# Patient Record
Sex: FEMALE | Race: WHITE | Employment: OTHER | URBAN - METROPOLITAN AREA
[De-identification: names, ages, dates, MRNs, and addresses within clinical notes are randomized per-mention and may not be internally consistent; named-entity substitution may affect disease eponyms.]

---

## 2017-01-10 ENCOUNTER — TELEPHONE (OUTPATIENT)
Dept: FAMILY MEDICINE CLINIC | Age: 82
End: 2017-01-10

## 2017-01-26 ENCOUNTER — OFFICE VISIT (OUTPATIENT)
Dept: FAMILY MEDICINE CLINIC | Age: 82
End: 2017-01-26

## 2017-01-26 VITALS — DIASTOLIC BLOOD PRESSURE: 70 MMHG | HEIGHT: 58 IN | SYSTOLIC BLOOD PRESSURE: 130 MMHG

## 2017-01-26 DIAGNOSIS — J40 BRONCHITIS: Primary | ICD-10-CM

## 2017-01-26 DIAGNOSIS — Z23 NEED FOR PNEUMOCOCCAL VACCINATION: ICD-10-CM

## 2017-01-26 DIAGNOSIS — I10 BENIGN ESSENTIAL HYPERTENSION: ICD-10-CM

## 2017-01-26 PROCEDURE — 4040F PNEUMOC VAC/ADMIN/RCVD: CPT | Performed by: FAMILY MEDICINE

## 2017-01-26 PROCEDURE — G0009 ADMIN PNEUMOCOCCAL VACCINE: HCPCS | Performed by: FAMILY MEDICINE

## 2017-01-26 PROCEDURE — G8427 DOCREV CUR MEDS BY ELIG CLIN: HCPCS | Performed by: FAMILY MEDICINE

## 2017-01-26 PROCEDURE — 1036F TOBACCO NON-USER: CPT | Performed by: FAMILY MEDICINE

## 2017-01-26 PROCEDURE — G8420 CALC BMI NORM PARAMETERS: HCPCS | Performed by: FAMILY MEDICINE

## 2017-01-26 PROCEDURE — 99213 OFFICE O/P EST LOW 20 MIN: CPT | Performed by: FAMILY MEDICINE

## 2017-01-26 PROCEDURE — G8484 FLU IMMUNIZE NO ADMIN: HCPCS | Performed by: FAMILY MEDICINE

## 2017-01-26 PROCEDURE — 1123F ACP DISCUSS/DSCN MKR DOCD: CPT | Performed by: FAMILY MEDICINE

## 2017-01-26 PROCEDURE — 1090F PRES/ABSN URINE INCON ASSESS: CPT | Performed by: FAMILY MEDICINE

## 2017-01-26 PROCEDURE — 90670 PCV13 VACCINE IM: CPT | Performed by: FAMILY MEDICINE

## 2017-01-26 RX ORDER — AMOXICILLIN 875 MG/1
875 TABLET, COATED ORAL 2 TIMES DAILY
Qty: 20 TABLET | Refills: 0 | Status: SHIPPED | OUTPATIENT
Start: 2017-01-26 | End: 2017-02-05

## 2017-01-26 ASSESSMENT — PATIENT HEALTH QUESTIONNAIRE - PHQ9
1. LITTLE INTEREST OR PLEASURE IN DOING THINGS: 1
SUM OF ALL RESPONSES TO PHQ9 QUESTIONS 1 & 2: 2
SUM OF ALL RESPONSES TO PHQ QUESTIONS 1-9: 2
2. FEELING DOWN, DEPRESSED OR HOPELESS: 1

## 2017-02-09 RX ORDER — LISINOPRIL AND HYDROCHLOROTHIAZIDE 20; 12.5 MG/1; MG/1
TABLET ORAL
Qty: 90 TABLET | Refills: 1 | Status: SHIPPED | OUTPATIENT
Start: 2017-02-09 | End: 2017-07-11 | Stop reason: SDUPTHER

## 2017-11-16 ENCOUNTER — OFFICE VISIT (OUTPATIENT)
Dept: FAMILY MEDICINE CLINIC | Age: 82
End: 2017-11-16

## 2017-11-16 VITALS — DIASTOLIC BLOOD PRESSURE: 64 MMHG | WEIGHT: 138 LBS | SYSTOLIC BLOOD PRESSURE: 102 MMHG | BODY MASS INDEX: 28.84 KG/M2

## 2017-11-16 DIAGNOSIS — I10 ESSENTIAL HYPERTENSION: ICD-10-CM

## 2017-11-16 DIAGNOSIS — M10.9 GOUTY ARTHROPATHY: ICD-10-CM

## 2017-11-16 DIAGNOSIS — Z23 NEED FOR INFLUENZA VACCINATION: ICD-10-CM

## 2017-11-16 DIAGNOSIS — E78.00 HYPERCHOLESTEROLEMIA: ICD-10-CM

## 2017-11-16 DIAGNOSIS — E11.9 TYPE 2 DIABETES MELLITUS WITHOUT COMPLICATION, WITHOUT LONG-TERM CURRENT USE OF INSULIN (HCC): Primary | ICD-10-CM

## 2017-11-16 LAB
ALT SERPL-CCNC: <5 U/L (ref 10–40)
ANION GAP SERPL CALCULATED.3IONS-SCNC: 26 MMOL/L (ref 3–16)
AST SERPL-CCNC: 12 U/L (ref 15–37)
BUN BLDV-MCNC: 12 MG/DL (ref 7–20)
CALCIUM SERPL-MCNC: 10.4 MG/DL (ref 8.3–10.6)
CHLORIDE BLD-SCNC: 91 MMOL/L (ref 99–110)
CHOLESTEROL, TOTAL: 235 MG/DL (ref 0–199)
CO2: 17 MMOL/L (ref 21–32)
CREAT SERPL-MCNC: 1 MG/DL (ref 0.6–1.2)
GFR AFRICAN AMERICAN: >60
GFR NON-AFRICAN AMERICAN: 52
GLUCOSE BLD-MCNC: 129 MG/DL (ref 70–99)
HDLC SERPL-MCNC: 56 MG/DL (ref 40–60)
LDL CHOLESTEROL CALCULATED: ABNORMAL MG/DL
LDL CHOLESTEROL DIRECT: 149 MG/DL
POTASSIUM SERPL-SCNC: 4.4 MMOL/L (ref 3.5–5.1)
SODIUM BLD-SCNC: 134 MMOL/L (ref 136–145)
TRIGL SERPL-MCNC: 332 MG/DL (ref 0–150)
URIC ACID, SERUM: 8.3 MG/DL (ref 2.6–6)
VLDLC SERPL CALC-MCNC: ABNORMAL MG/DL

## 2017-11-16 PROCEDURE — G8427 DOCREV CUR MEDS BY ELIG CLIN: HCPCS | Performed by: FAMILY MEDICINE

## 2017-11-16 PROCEDURE — 4040F PNEUMOC VAC/ADMIN/RCVD: CPT | Performed by: FAMILY MEDICINE

## 2017-11-16 PROCEDURE — 1036F TOBACCO NON-USER: CPT | Performed by: FAMILY MEDICINE

## 2017-11-16 PROCEDURE — G8419 CALC BMI OUT NRM PARAM NOF/U: HCPCS | Performed by: FAMILY MEDICINE

## 2017-11-16 PROCEDURE — 99214 OFFICE O/P EST MOD 30 MIN: CPT | Performed by: FAMILY MEDICINE

## 2017-11-16 PROCEDURE — 1123F ACP DISCUSS/DSCN MKR DOCD: CPT | Performed by: FAMILY MEDICINE

## 2017-11-16 PROCEDURE — 36415 COLL VENOUS BLD VENIPUNCTURE: CPT | Performed by: FAMILY MEDICINE

## 2017-11-16 PROCEDURE — 1090F PRES/ABSN URINE INCON ASSESS: CPT | Performed by: FAMILY MEDICINE

## 2017-11-16 PROCEDURE — G8484 FLU IMMUNIZE NO ADMIN: HCPCS | Performed by: FAMILY MEDICINE

## 2017-11-16 RX ORDER — LISINOPRIL AND HYDROCHLOROTHIAZIDE 20; 12.5 MG/1; MG/1
TABLET ORAL
Qty: 90 TABLET | Refills: 1 | Status: SHIPPED | OUTPATIENT
Start: 2017-11-16 | End: 2018-03-28 | Stop reason: DRUGHIGH

## 2017-11-16 ASSESSMENT — ENCOUNTER SYMPTOMS: SHORTNESS OF BREATH: 1

## 2017-11-16 NOTE — PROGRESS NOTES
Subjective:      Patient ID: Josue Woods is a 80 y.o. female. Diabetes   She presents for her follow-up diabetic visit. She has type 2 diabetes mellitus. Her disease course has been stable. Pertinent negatives for diabetes include no chest pain, no foot paresthesias, no foot ulcerations, no polydipsia and no polyuria. There are no hypoglycemic complications. Symptoms are stable. There are no diabetic complications. Risk factors for coronary artery disease include hypertension, post-menopausal, sedentary lifestyle, diabetes mellitus, dyslipidemia and obesity. Current diabetic treatment includes oral agent (monotherapy). She is compliant with treatment all of the time. Her weight is stable. She is following a generally healthy diet. She never participates in exercise. There is no change in her home blood glucose trend. Her overall blood glucose range is 110-130 mg/dl. An ACE inhibitor/angiotensin II receptor blocker is being taken. Eye exam is current (May 2017). Hypertension   This is a chronic problem. The current episode started more than 1 year ago. The problem is unchanged. The problem is controlled. Associated symptoms include shortness of breath. Pertinent negatives include no chest pain, palpitations or peripheral edema. Past treatments include ACE inhibitors and diuretics. The current treatment provides significant improvement. There are no compliance problems. Hyperlipidemia:  Pt has not taken Lipitor for the past 2 years per her daughter. Gout:  Pt no longer takes Allopurinol 100 mg daily or colchicine 06 mg daily. She denies any gout attacks since her last visit. She is due for a Uric Acid recheck today. Review of Systems   Constitutional: Negative for chills and fever. Respiratory: Positive for shortness of breath. Cardiovascular: Negative for chest pain and palpitations. Endocrine: Negative for polydipsia and polyuria.       BP Readings from Last 3 Encounters:   11/16/17

## 2017-11-17 LAB
ESTIMATED AVERAGE GLUCOSE: 114 MG/DL
HBA1C MFR BLD: 5.6 %

## 2018-01-31 ENCOUNTER — TELEPHONE (OUTPATIENT)
Dept: FAMILY MEDICINE CLINIC | Age: 83
End: 2018-01-31

## 2018-02-01 ENCOUNTER — OFFICE VISIT (OUTPATIENT)
Dept: FAMILY MEDICINE CLINIC | Age: 83
End: 2018-02-01

## 2018-02-01 VITALS — SYSTOLIC BLOOD PRESSURE: 128 MMHG | DIASTOLIC BLOOD PRESSURE: 80 MMHG | HEIGHT: 58 IN

## 2018-02-01 DIAGNOSIS — M15.9 PRIMARY OSTEOARTHRITIS INVOLVING MULTIPLE JOINTS: ICD-10-CM

## 2018-02-01 DIAGNOSIS — I10 ESSENTIAL HYPERTENSION: ICD-10-CM

## 2018-02-01 DIAGNOSIS — G30.1 LATE ONSET ALZHEIMER'S DISEASE WITHOUT BEHAVIORAL DISTURBANCE (HCC): ICD-10-CM

## 2018-02-01 DIAGNOSIS — R29.898 WEAKNESS OF RIGHT HIP: Primary | ICD-10-CM

## 2018-02-01 DIAGNOSIS — F02.80 LATE ONSET ALZHEIMER'S DISEASE WITHOUT BEHAVIORAL DISTURBANCE (HCC): ICD-10-CM

## 2018-02-01 DIAGNOSIS — E11.9 TYPE 2 DIABETES MELLITUS WITHOUT COMPLICATION, WITHOUT LONG-TERM CURRENT USE OF INSULIN (HCC): ICD-10-CM

## 2018-02-01 LAB
A/G RATIO: 1 (ref 1.1–2.2)
ALBUMIN SERPL-MCNC: 3.7 G/DL (ref 3.4–5)
ALP BLD-CCNC: 111 U/L (ref 40–129)
ALT SERPL-CCNC: 7 U/L (ref 10–40)
ANION GAP SERPL CALCULATED.3IONS-SCNC: 23 MMOL/L (ref 3–16)
AST SERPL-CCNC: 16 U/L (ref 15–37)
BILIRUB SERPL-MCNC: 0.4 MG/DL (ref 0–1)
BUN BLDV-MCNC: 15 MG/DL (ref 7–20)
CALCIUM SERPL-MCNC: 10 MG/DL (ref 8.3–10.6)
CHLORIDE BLD-SCNC: 96 MMOL/L (ref 99–110)
CO2: 20 MMOL/L (ref 21–32)
CREAT SERPL-MCNC: 1 MG/DL (ref 0.6–1.2)
GFR AFRICAN AMERICAN: >60
GFR NON-AFRICAN AMERICAN: 52
GLOBULIN: 3.7 G/DL
GLUCOSE BLD-MCNC: 100 MG/DL (ref 70–99)
HCT VFR BLD CALC: 40.3 % (ref 36–48)
HEMOGLOBIN: 13.1 G/DL (ref 12–16)
MCH RBC QN AUTO: 30.3 PG (ref 26–34)
MCHC RBC AUTO-ENTMCNC: 32.5 G/DL (ref 31–36)
MCV RBC AUTO: 93.3 FL (ref 80–100)
PDW BLD-RTO: 14.8 % (ref 12.4–15.4)
PLATELET # BLD: 435 K/UL (ref 135–450)
PMV BLD AUTO: 7.9 FL (ref 5–10.5)
POTASSIUM SERPL-SCNC: 4.3 MMOL/L (ref 3.5–5.1)
RBC # BLD: 4.32 M/UL (ref 4–5.2)
SEDIMENTATION RATE, ERYTHROCYTE: 59 MM/HR (ref 0–30)
SODIUM BLD-SCNC: 139 MMOL/L (ref 136–145)
TOTAL PROTEIN: 7.4 G/DL (ref 6.4–8.2)
TSH REFLEX: 1.24 UIU/ML (ref 0.27–4.2)
WBC # BLD: 10.9 K/UL (ref 4–11)

## 2018-02-01 PROCEDURE — 1036F TOBACCO NON-USER: CPT | Performed by: FAMILY MEDICINE

## 2018-02-01 PROCEDURE — G8419 CALC BMI OUT NRM PARAM NOF/U: HCPCS | Performed by: FAMILY MEDICINE

## 2018-02-01 PROCEDURE — G8484 FLU IMMUNIZE NO ADMIN: HCPCS | Performed by: FAMILY MEDICINE

## 2018-02-01 PROCEDURE — 4040F PNEUMOC VAC/ADMIN/RCVD: CPT | Performed by: FAMILY MEDICINE

## 2018-02-01 PROCEDURE — 36415 COLL VENOUS BLD VENIPUNCTURE: CPT | Performed by: FAMILY MEDICINE

## 2018-02-01 PROCEDURE — 99214 OFFICE O/P EST MOD 30 MIN: CPT | Performed by: FAMILY MEDICINE

## 2018-02-01 PROCEDURE — 1123F ACP DISCUSS/DSCN MKR DOCD: CPT | Performed by: FAMILY MEDICINE

## 2018-02-01 PROCEDURE — 1090F PRES/ABSN URINE INCON ASSESS: CPT | Performed by: FAMILY MEDICINE

## 2018-02-01 PROCEDURE — G8427 DOCREV CUR MEDS BY ELIG CLIN: HCPCS | Performed by: FAMILY MEDICINE

## 2018-02-01 NOTE — PROGRESS NOTES
insulin ([facility] [address] good control                  Plan:      Lab  Ref for home therapy  Cont meds   Spent 30 minutes with patient and/or family in which greater than half the time was focused on counseling and educating patient and/or  family regarding current health isues  rto4 week

## 2018-02-02 ASSESSMENT — PATIENT HEALTH QUESTIONNAIRE - PHQ9
SUM OF ALL RESPONSES TO PHQ QUESTIONS 1-9: 2
SUM OF ALL RESPONSES TO PHQ9 QUESTIONS 1 & 2: 2
2. FEELING DOWN, DEPRESSED OR HOPELESS: 1
1. LITTLE INTEREST OR PLEASURE IN DOING THINGS: 1

## 2018-02-06 ENCOUNTER — TELEPHONE (OUTPATIENT)
Dept: FAMILY MEDICINE CLINIC | Age: 83
End: 2018-02-06

## 2018-02-07 ENCOUNTER — NURSE ONLY (OUTPATIENT)
Dept: FAMILY MEDICINE CLINIC | Age: 83
End: 2018-02-07

## 2018-02-07 ENCOUNTER — TELEPHONE (OUTPATIENT)
Dept: FAMILY MEDICINE CLINIC | Age: 83
End: 2018-02-07

## 2018-02-07 DIAGNOSIS — R35.0 FREQUENT URINATION: Primary | ICD-10-CM

## 2018-02-07 LAB
BILIRUBIN, POC: ABNORMAL
BLOOD URINE, POC: ABNORMAL
CLARITY, POC: ABNORMAL
COLOR, POC: YELLOW
GLUCOSE URINE, POC: ABNORMAL
KETONES, POC: ABNORMAL
LEUKOCYTE EST, POC: ABNORMAL
NITRITE, POC: POSITIVE
PH, POC: 6.5
PROTEIN, POC: ABNORMAL
SPECIFIC GRAVITY, POC: 1.01
UROBILINOGEN, POC: 0.2

## 2018-02-07 PROCEDURE — 81002 URINALYSIS NONAUTO W/O SCOPE: CPT | Performed by: FAMILY MEDICINE

## 2018-02-07 RX ORDER — CIPROFLOXACIN 250 MG/1
250 TABLET, FILM COATED ORAL 2 TIMES DAILY
Qty: 14 TABLET | Refills: 0 | Status: SHIPPED | OUTPATIENT
Start: 2018-02-07 | End: 2018-02-15 | Stop reason: SDUPTHER

## 2018-02-07 NOTE — TELEPHONE ENCOUNTER
Pt's daughter brougth urine sample in   UA done results in lab tab  Urine sent for culture  CHEN Jauregui  Please call Mingo Fabry with message [phone]

## 2018-02-07 NOTE — PROGRESS NOTES
Pt's daughter dropped urine sample off  Pt is experiencing oder, frequency and confusion  UA done results in lab tab  Will send urine for culture

## 2018-02-09 LAB
ORGANISM: ABNORMAL
URINE CULTURE, ROUTINE: ABNORMAL
URINE CULTURE, ROUTINE: ABNORMAL

## 2018-02-14 ENCOUNTER — NURSE ONLY (OUTPATIENT)
Dept: FAMILY MEDICINE CLINIC | Age: 83
End: 2018-02-14

## 2018-02-14 DIAGNOSIS — R31.9 HEMATURIA, UNSPECIFIED TYPE: Primary | ICD-10-CM

## 2018-02-14 LAB
BILIRUBIN, POC: NORMAL
BLOOD URINE, POC: NORMAL
CLARITY, POC: NORMAL
COLOR, POC: YELLOW
GLUCOSE URINE, POC: NORMAL
KETONES, POC: NORMAL
LEUKOCYTE EST, POC: NORMAL
NITRITE, POC: NORMAL
PH, POC: 5.5
PROTEIN, POC: NORMAL
SPECIFIC GRAVITY, POC: 1.02
UROBILINOGEN, POC: 0.2

## 2018-02-14 PROCEDURE — 81002 URINALYSIS NONAUTO W/O SCOPE: CPT | Performed by: FAMILY MEDICINE

## 2018-02-15 ENCOUNTER — TELEPHONE (OUTPATIENT)
Dept: FAMILY MEDICINE CLINIC | Age: 83
End: 2018-02-15

## 2018-02-15 RX ORDER — CIPROFLOXACIN 250 MG/1
250 TABLET, FILM COATED ORAL 2 TIMES DAILY
Qty: 14 TABLET | Refills: 0 | Status: SHIPPED | OUTPATIENT
Start: 2018-02-15 | End: 2018-02-22

## 2018-02-15 NOTE — TELEPHONE ENCOUNTER
Patient daughter Emmie Cushing called wanting to know about her mom UA results.   Contact Susi at [phone]

## 2018-03-28 ENCOUNTER — OFFICE VISIT (OUTPATIENT)
Dept: FAMILY MEDICINE CLINIC | Age: 83
End: 2018-03-28

## 2018-03-28 VITALS
SYSTOLIC BLOOD PRESSURE: 122 MMHG | OXYGEN SATURATION: 98 % | HEART RATE: 88 BPM | WEIGHT: 136 LBS | DIASTOLIC BLOOD PRESSURE: 84 MMHG | TEMPERATURE: 98.6 F | HEIGHT: 58 IN | BODY MASS INDEX: 28.55 KG/M2 | RESPIRATION RATE: 20 BRPM

## 2018-03-28 DIAGNOSIS — R41.3 MEMORY PROBLEM: ICD-10-CM

## 2018-03-28 DIAGNOSIS — N30.00 ACUTE CYSTITIS WITHOUT HEMATURIA: ICD-10-CM

## 2018-03-28 DIAGNOSIS — Z74.09 IMPAIRED MOBILITY: ICD-10-CM

## 2018-03-28 DIAGNOSIS — E11.9 TYPE 2 DIABETES MELLITUS WITHOUT COMPLICATION, WITHOUT LONG-TERM CURRENT USE OF INSULIN (HCC): ICD-10-CM

## 2018-03-28 DIAGNOSIS — N18.30 CKD (CHRONIC KIDNEY DISEASE) STAGE 3, GFR 30-59 ML/MIN (HCC): ICD-10-CM

## 2018-03-28 DIAGNOSIS — Z85.3 HISTORY OF BREAST CANCER: ICD-10-CM

## 2018-03-28 DIAGNOSIS — I10 ESSENTIAL HYPERTENSION: Primary | ICD-10-CM

## 2018-03-28 LAB
BILIRUBIN, POC: ABNORMAL
BLOOD URINE, POC: ABNORMAL
CLARITY, POC: CLEAR
COLOR, POC: YELLOW
GLUCOSE URINE, POC: ABNORMAL
KETONES, POC: ABNORMAL
LEUKOCYTE EST, POC: ABNORMAL
NITRITE, POC: ABNORMAL
PH, POC: 8.5
PROTEIN, POC: ABNORMAL
SPECIFIC GRAVITY, POC: 1.01
UROBILINOGEN, POC: 0.2

## 2018-03-28 PROCEDURE — G8419 CALC BMI OUT NRM PARAM NOF/U: HCPCS | Performed by: FAMILY MEDICINE

## 2018-03-28 PROCEDURE — 1036F TOBACCO NON-USER: CPT | Performed by: FAMILY MEDICINE

## 2018-03-28 PROCEDURE — 4040F PNEUMOC VAC/ADMIN/RCVD: CPT | Performed by: FAMILY MEDICINE

## 2018-03-28 PROCEDURE — G8427 DOCREV CUR MEDS BY ELIG CLIN: HCPCS | Performed by: FAMILY MEDICINE

## 2018-03-28 PROCEDURE — 81002 URINALYSIS NONAUTO W/O SCOPE: CPT | Performed by: FAMILY MEDICINE

## 2018-03-28 PROCEDURE — G8484 FLU IMMUNIZE NO ADMIN: HCPCS | Performed by: FAMILY MEDICINE

## 2018-03-28 PROCEDURE — 99215 OFFICE O/P EST HI 40 MIN: CPT | Performed by: FAMILY MEDICINE

## 2018-03-28 PROCEDURE — 1123F ACP DISCUSS/DSCN MKR DOCD: CPT | Performed by: FAMILY MEDICINE

## 2018-03-28 PROCEDURE — 1090F PRES/ABSN URINE INCON ASSESS: CPT | Performed by: FAMILY MEDICINE

## 2018-03-28 RX ORDER — LISINOPRIL AND HYDROCHLOROTHIAZIDE 12.5; 1 MG/1; MG/1
1 TABLET ORAL DAILY
Qty: 30 TABLET | Refills: 3 | Status: SHIPPED | OUTPATIENT
Start: 2018-03-28 | End: 2018-07-23 | Stop reason: SDUPTHER

## 2018-03-28 RX ORDER — CEPHALEXIN 500 MG/1
500 CAPSULE ORAL 2 TIMES DAILY
Qty: 14 CAPSULE | Refills: 0 | Status: SHIPPED | OUTPATIENT
Start: 2018-03-28 | End: 2018-03-28 | Stop reason: SDUPTHER

## 2018-03-28 RX ORDER — CEPHALEXIN 500 MG/1
500 CAPSULE ORAL 2 TIMES DAILY
Qty: 14 CAPSULE | Refills: 0 | Status: SHIPPED | OUTPATIENT
Start: 2018-03-28 | End: 2018-04-25 | Stop reason: ALTCHOICE

## 2018-03-28 RX ORDER — LISINOPRIL AND HYDROCHLOROTHIAZIDE 12.5; 1 MG/1; MG/1
1 TABLET ORAL DAILY
Qty: 30 TABLET | Refills: 3 | Status: SHIPPED | OUTPATIENT
Start: 2018-03-28 | End: 2018-03-28 | Stop reason: SDUPTHER

## 2018-03-28 NOTE — PROGRESS NOTES
3/28/2018    Riley Piña is a 80 y.o. female here to establish care with me. HPI   [place]  3 daughters, lives with  (who is seen here) and her daughter Kade Nephew  7 grandkids, most in town    Hx of breast cancer - In 2000. She had chemo, surgery, and radiation. Chronic low back pain - has a stimulator implant, placed in May 2011. Used to see Dr. Conor Dowd but he moved. Has been a few years since seeing someone for this. Memory - pt is often confused, has to be reminded by daughter to go to bathroom. Wears diapers to prevent accidents. Gets her grandchildren and great grandchildren confused sometimes. No hallucinations. Mobility - Spends her time sitting / lying down, doesn't ambulate well - uses a walker for this. She has had a few falls in the past year. She stays on the first level, does not use stairs. There are stairs off the porch that she struggles with    Daughter has noticed strong urine smell recently, peeing more often than usual. Last UTI 2/7/18 with pan-sensitive Citrobacter. HTN - on prinzide, doing well on this. DM - on metformin. runs in the 130s, 150s. Checks blood sugar about once per week. Lab Results   Component Value Date    LABA1C 5.6 11/16/2017     Lab Results   Component Value Date    .0 11/16/2017       Review of Systems   Constitutional: Negative for chills and fever. HENT: Negative for congestion, sinus pressure and sore throat. Eyes: Negative for redness and visual disturbance. Respiratory: Negative for cough and shortness of breath. Cardiovascular: Negative for chest pain and leg swelling. Gastrointestinal: Negative for diarrhea, nausea and vomiting. Genitourinary: Positive for enuresis and frequency. Negative for difficulty urinating. Skin: Negative for color change and rash. Neurological: Positive for weakness. Negative for dizziness and headaches. Psychiatric/Behavioral: Positive for confusion.  Negative for agitation and hallucinations. Prior to Visit Medications    Medication Sig Taking? Authorizing Provider   lisinopril-hydrochlorothiazide (PRINZIDE;ZESTORETIC) 10-12.5 MG per tablet Take 1 tablet by mouth daily Yes Hill Mondragon MD   cephALEXin (KEFLEX) 500 MG capsule Take 1 capsule by mouth 2 times daily Yes Hill Mondragon MD   metFORMIN (GLUCOPHAGE) 500 MG tablet TAKE 1 TABLET TWICE DAILY  WITH MEALS Yes Janet Mera DO   aspirin 81 MG EC tablet Take 1 tablet by mouth daily Yes Elise Palacios MD     Past Medical History:   Diagnosis Date    Cataract     Chronic midline low back pain without sciatica     Essential hypertension     Gouty arthropathy     History of breast cancer     Hypercholesterolemia     Lumbar spondylosis     Osteoporosis of multiple sites     Primary osteoarthritis involving multiple joints     Spinal stenosis, lumbar     Type 2 diabetes mellitus without complication, without long-term current use of insulin [facility])      Past Surgical History:   Procedure Laterality Date    CATARACT REMOVAL  jan 2012    dr Mynor Oliver left    CATARACT REMOVAL  Nov. 2012    dr Mynor Oliver  right    CHOLECYSTECTOMY      MASTECTOMY       No family history on file.   Social History     Social History    Marital status:      Spouse name: N/A    Number of children: 3    Years of education: N/A     Social History Main Topics    Smoking status: Never Smoker    Smokeless tobacco: Never Used    Alcohol use No    Drug use: No    Sexual activity: Not on file     Other Topics Concern    Not on file     Social History Narrative    Grew up in [place]    3 daughters, lives with  (who is seen here) and her daughter Erasto Skaggs    7 grandkids, most in town     No Known Allergies  Health Maintenance   Topic Date Due    Shingles Vaccine (1 of 2 - 2 Dose Series) 09/02/1979    Flu vaccine (1) 09/01/2017    Pneumococcal low/med risk (2 of 2 - PPSV23) 01/26/2018    Potassium monitoring  02/01/2019   

## 2018-03-29 ASSESSMENT — ENCOUNTER SYMPTOMS
SHORTNESS OF BREATH: 0
COLOR CHANGE: 0
DIARRHEA: 0
COUGH: 0
EYE REDNESS: 0
NAUSEA: 0
SINUS PRESSURE: 0
SORE THROAT: 0
VOMITING: 0

## 2018-03-30 LAB
ORGANISM: ABNORMAL
URINE CULTURE, ROUTINE: ABNORMAL
URINE CULTURE, ROUTINE: ABNORMAL

## 2018-04-03 DIAGNOSIS — E11.9 TYPE 2 DIABETES MELLITUS WITHOUT COMPLICATION, WITHOUT LONG-TERM CURRENT USE OF INSULIN (HCC): ICD-10-CM

## 2018-04-03 DIAGNOSIS — R41.3 MEMORY PROBLEM: ICD-10-CM

## 2018-04-03 LAB
A/G RATIO: 1.4 (ref 1.1–2.2)
ALBUMIN SERPL-MCNC: 4.1 G/DL (ref 3.4–5)
ALP BLD-CCNC: 74 U/L (ref 40–129)
ALT SERPL-CCNC: <5 U/L (ref 10–40)
ANION GAP SERPL CALCULATED.3IONS-SCNC: 20 MMOL/L (ref 3–16)
AST SERPL-CCNC: 8 U/L (ref 15–37)
BASOPHILS ABSOLUTE: 0.1 K/UL (ref 0–0.2)
BASOPHILS RELATIVE PERCENT: 0.6 %
BILIRUB SERPL-MCNC: 0.8 MG/DL (ref 0–1)
BUN BLDV-MCNC: 17 MG/DL (ref 7–20)
CALCIUM SERPL-MCNC: 9.8 MG/DL (ref 8.3–10.6)
CHLORIDE BLD-SCNC: 95 MMOL/L (ref 99–110)
CO2: 24 MMOL/L (ref 21–32)
CREAT SERPL-MCNC: 1 MG/DL (ref 0.6–1.2)
EOSINOPHILS ABSOLUTE: 0.3 K/UL (ref 0–0.6)
EOSINOPHILS RELATIVE PERCENT: 2.6 %
ESTIMATED AVERAGE GLUCOSE: 108.3 MG/DL
FOLATE: 4.73 NG/ML (ref 4.78–24.2)
GFR AFRICAN AMERICAN: >60
GFR NON-AFRICAN AMERICAN: 52
GLOBULIN: 3 G/DL
GLUCOSE BLD-MCNC: 99 MG/DL (ref 70–99)
HBA1C MFR BLD: 5.4 %
HCT VFR BLD CALC: 40.3 % (ref 36–48)
HEMOGLOBIN: 13.7 G/DL (ref 12–16)
LYMPHOCYTES ABSOLUTE: 3.5 K/UL (ref 1–5.1)
LYMPHOCYTES RELATIVE PERCENT: 33.3 %
MCH RBC QN AUTO: 30.9 PG (ref 26–34)
MCHC RBC AUTO-ENTMCNC: 33.9 G/DL (ref 31–36)
MCV RBC AUTO: 91.1 FL (ref 80–100)
MONOCYTES ABSOLUTE: 1 K/UL (ref 0–1.3)
MONOCYTES RELATIVE PERCENT: 9.2 %
NEUTROPHILS ABSOLUTE: 5.6 K/UL (ref 1.7–7.7)
NEUTROPHILS RELATIVE PERCENT: 54.3 %
PDW BLD-RTO: 15.8 % (ref 12.4–15.4)
PLATELET # BLD: 292 K/UL (ref 135–450)
PMV BLD AUTO: 8.8 FL (ref 5–10.5)
POTASSIUM SERPL-SCNC: 4.4 MMOL/L (ref 3.5–5.1)
RBC # BLD: 4.42 M/UL (ref 4–5.2)
SODIUM BLD-SCNC: 139 MMOL/L (ref 136–145)
TOTAL PROTEIN: 7.1 G/DL (ref 6.4–8.2)
TSH SERPL DL<=0.05 MIU/L-ACNC: 3.05 UIU/ML (ref 0.27–4.2)
VITAMIN B-12: 209 PG/ML (ref 211–911)
WBC # BLD: 10.4 K/UL (ref 4–11)

## 2018-04-04 ENCOUNTER — TELEPHONE (OUTPATIENT)
Dept: FAMILY MEDICINE CLINIC | Age: 83
End: 2018-04-04

## 2018-04-04 DIAGNOSIS — E53.8 FOLATE DEFICIENCY: ICD-10-CM

## 2018-04-04 DIAGNOSIS — E53.8 VITAMIN B12 DEFICIENCY: Primary | ICD-10-CM

## 2018-04-04 RX ORDER — FOLIC ACID 5 MG
5 CAPSULE ORAL DAILY
Qty: 30 CAPSULE | Refills: 5 | Status: SHIPPED | OUTPATIENT
Start: 2018-04-04 | End: 2019-02-11

## 2018-04-06 ENCOUNTER — HOSPITAL ENCOUNTER (OUTPATIENT)
Dept: CT IMAGING | Age: 83
Discharge: HOME | End: 2018-04-06
Attending: FAMILY MEDICINE | Admitting: FAMILY MEDICINE

## 2018-04-06 DIAGNOSIS — R41.3 OTHER AMNESIA: ICD-10-CM

## 2018-04-06 DIAGNOSIS — R41.3 MEMORY PROBLEM: ICD-10-CM

## 2018-04-25 ENCOUNTER — OFFICE VISIT (OUTPATIENT)
Dept: FAMILY MEDICINE CLINIC | Age: 83
End: 2018-04-25

## 2018-04-25 VITALS
WEIGHT: 129.4 LBS | OXYGEN SATURATION: 97 % | SYSTOLIC BLOOD PRESSURE: 110 MMHG | HEIGHT: 58 IN | BODY MASS INDEX: 27.16 KG/M2 | HEART RATE: 72 BPM | DIASTOLIC BLOOD PRESSURE: 62 MMHG | RESPIRATION RATE: 16 BRPM | TEMPERATURE: 98.8 F

## 2018-04-25 DIAGNOSIS — G30.9 DEMENTIA DUE TO ALZHEIMER'S DISEASE (HCC): Primary | ICD-10-CM

## 2018-04-25 DIAGNOSIS — E53.8 FOLATE DEFICIENCY: ICD-10-CM

## 2018-04-25 DIAGNOSIS — E11.9 TYPE 2 DIABETES MELLITUS WITHOUT COMPLICATION, WITHOUT LONG-TERM CURRENT USE OF INSULIN (HCC): ICD-10-CM

## 2018-04-25 DIAGNOSIS — F02.80 DEMENTIA DUE TO ALZHEIMER'S DISEASE (HCC): Primary | ICD-10-CM

## 2018-04-25 DIAGNOSIS — E53.8 VITAMIN B12 DEFICIENCY: ICD-10-CM

## 2018-04-25 DIAGNOSIS — I10 ESSENTIAL HYPERTENSION: ICD-10-CM

## 2018-04-25 DIAGNOSIS — R26.89 DECREASED MOBILITY: ICD-10-CM

## 2018-04-25 PROCEDURE — G8419 CALC BMI OUT NRM PARAM NOF/U: HCPCS | Performed by: FAMILY MEDICINE

## 2018-04-25 PROCEDURE — 4040F PNEUMOC VAC/ADMIN/RCVD: CPT | Performed by: FAMILY MEDICINE

## 2018-04-25 PROCEDURE — 1123F ACP DISCUSS/DSCN MKR DOCD: CPT | Performed by: FAMILY MEDICINE

## 2018-04-25 PROCEDURE — G8427 DOCREV CUR MEDS BY ELIG CLIN: HCPCS | Performed by: FAMILY MEDICINE

## 2018-04-25 PROCEDURE — 1036F TOBACCO NON-USER: CPT | Performed by: FAMILY MEDICINE

## 2018-04-25 PROCEDURE — 99214 OFFICE O/P EST MOD 30 MIN: CPT | Performed by: FAMILY MEDICINE

## 2018-04-25 PROCEDURE — 1090F PRES/ABSN URINE INCON ASSESS: CPT | Performed by: FAMILY MEDICINE

## 2018-04-25 RX ORDER — WHEELCHAIR
1 EACH MISCELLANEOUS DAILY
Qty: 1 EACH | Refills: 0 | Status: SHIPPED | OUTPATIENT
Start: 2018-04-25 | End: 2018-04-25 | Stop reason: SDUPTHER

## 2018-04-25 RX ORDER — ESCITALOPRAM OXALATE 5 MG/1
TABLET ORAL
Qty: 30 TABLET | Refills: 3 | Status: SHIPPED | OUTPATIENT
Start: 2018-04-25 | End: 2018-06-06 | Stop reason: DRUGHIGH

## 2018-04-25 RX ORDER — WHEELCHAIR
1 EACH MISCELLANEOUS DAILY
Qty: 1 EACH | Refills: 0 | Status: SHIPPED | OUTPATIENT
Start: 2018-04-25 | End: 2018-12-05

## 2018-04-30 ENCOUNTER — TELEPHONE (OUTPATIENT)
Dept: FAMILY MEDICINE CLINIC | Age: 83
End: 2018-04-30

## 2018-04-30 DIAGNOSIS — M48.061 SPINAL STENOSIS OF LUMBAR REGION, UNSPECIFIED WHETHER NEUROGENIC CLAUDICATION PRESENT: Primary | ICD-10-CM

## 2018-05-02 RX ORDER — WHEELCHAIR
EACH MISCELLANEOUS
Qty: 1 EACH | Refills: 0 | Status: SHIPPED | OUTPATIENT
Start: 2018-05-02 | End: 2018-12-05

## 2018-05-24 ENCOUNTER — HOSPITAL ENCOUNTER (OUTPATIENT)
Dept: OTHER | Age: 83
Discharge: HOME | End: 2018-05-24
Attending: FAMILY MEDICINE | Admitting: FAMILY MEDICINE

## 2018-05-24 ENCOUNTER — OFFICE VISIT (OUTPATIENT)
Dept: FAMILY MEDICINE CLINIC | Age: 83
End: 2018-05-24

## 2018-05-24 VITALS
BODY MASS INDEX: 26.24 KG/M2 | RESPIRATION RATE: 14 BRPM | OXYGEN SATURATION: 98 % | SYSTOLIC BLOOD PRESSURE: 132 MMHG | WEIGHT: 125 LBS | HEIGHT: 58 IN | DIASTOLIC BLOOD PRESSURE: 80 MMHG | HEART RATE: 98 BPM

## 2018-05-24 DIAGNOSIS — L03.116 CELLULITIS OF LEFT FOOT: ICD-10-CM

## 2018-05-24 DIAGNOSIS — R30.0 DYSURIA: Primary | ICD-10-CM

## 2018-05-24 LAB
BILIRUBIN, POC: ABNORMAL
BLOOD URINE, POC: ABNORMAL
CLARITY, POC: ABNORMAL
COLOR, POC: YELLOW
GLUCOSE URINE, POC: ABNORMAL
KETONES, POC: ABNORMAL
LEUKOCYTE EST, POC: ABNORMAL
NITRITE, POC: ABNORMAL
PH, POC: 6
PROTEIN, POC: ABNORMAL
SPECIFIC GRAVITY, POC: 1.02
UROBILINOGEN, POC: 0.2

## 2018-05-24 PROCEDURE — 1090F PRES/ABSN URINE INCON ASSESS: CPT | Performed by: FAMILY MEDICINE

## 2018-05-24 PROCEDURE — 99213 OFFICE O/P EST LOW 20 MIN: CPT | Performed by: FAMILY MEDICINE

## 2018-05-24 PROCEDURE — 1036F TOBACCO NON-USER: CPT | Performed by: FAMILY MEDICINE

## 2018-05-24 PROCEDURE — G8419 CALC BMI OUT NRM PARAM NOF/U: HCPCS | Performed by: FAMILY MEDICINE

## 2018-05-24 PROCEDURE — 81002 URINALYSIS NONAUTO W/O SCOPE: CPT | Performed by: FAMILY MEDICINE

## 2018-05-24 PROCEDURE — G8427 DOCREV CUR MEDS BY ELIG CLIN: HCPCS | Performed by: FAMILY MEDICINE

## 2018-05-24 PROCEDURE — 4040F PNEUMOC VAC/ADMIN/RCVD: CPT | Performed by: FAMILY MEDICINE

## 2018-05-24 PROCEDURE — 1123F ACP DISCUSS/DSCN MKR DOCD: CPT | Performed by: FAMILY MEDICINE

## 2018-05-24 RX ORDER — CEPHALEXIN 500 MG/1
500 CAPSULE ORAL 2 TIMES DAILY
Qty: 20 CAPSULE | Refills: 0 | Status: SHIPPED | OUTPATIENT
Start: 2018-05-24 | End: 2018-07-15

## 2018-05-24 ASSESSMENT — ENCOUNTER SYMPTOMS
DIARRHEA: 0
CONSTIPATION: 0

## 2018-05-25 ENCOUNTER — TELEPHONE (OUTPATIENT)
Dept: FAMILY MEDICINE CLINIC | Age: 83
End: 2018-05-25

## 2018-05-27 ENCOUNTER — TELEPHONE (OUTPATIENT)
Dept: FAMILY MEDICINE CLINIC | Age: 83
End: 2018-05-27

## 2018-05-27 LAB
ORGANISM: ABNORMAL
ORGANISM: ABNORMAL
URINE CULTURE, ROUTINE: ABNORMAL

## 2018-05-27 RX ORDER — LEVOFLOXACIN 250 MG/1
250 TABLET ORAL DAILY
Qty: 7 TABLET | Refills: 0 | Status: SHIPPED | OUTPATIENT
Start: 2018-05-27 | End: 2018-06-03

## 2018-06-06 ENCOUNTER — OFFICE VISIT (OUTPATIENT)
Dept: FAMILY MEDICINE CLINIC | Age: 83
End: 2018-06-06

## 2018-06-06 VITALS
BODY MASS INDEX: 22.15 KG/M2 | RESPIRATION RATE: 14 BRPM | WEIGHT: 106 LBS | SYSTOLIC BLOOD PRESSURE: 112 MMHG | HEART RATE: 110 BPM | OXYGEN SATURATION: 97 % | DIASTOLIC BLOOD PRESSURE: 70 MMHG

## 2018-06-06 DIAGNOSIS — R82.90 ABNORMAL FINDING IN URINE: ICD-10-CM

## 2018-06-06 DIAGNOSIS — F39 MOOD DISORDER (HCC): ICD-10-CM

## 2018-06-06 DIAGNOSIS — G30.9 DEMENTIA DUE TO ALZHEIMER'S DISEASE (HCC): ICD-10-CM

## 2018-06-06 DIAGNOSIS — F02.80 DEMENTIA DUE TO ALZHEIMER'S DISEASE (HCC): ICD-10-CM

## 2018-06-06 DIAGNOSIS — L02.612 ABSCESS OF LEFT FOOT: Primary | ICD-10-CM

## 2018-06-06 DIAGNOSIS — Z87.440 HISTORY OF UTI: ICD-10-CM

## 2018-06-06 LAB
BILIRUBIN, POC: ABNORMAL
BLOOD URINE, POC: ABNORMAL
CLARITY, POC: CLEAR
COLOR, POC: YELLOW
GLUCOSE URINE, POC: ABNORMAL
KETONES, POC: ABNORMAL
LEUKOCYTE EST, POC: ABNORMAL
NITRITE, POC: ABNORMAL
PH, POC: 5.5
PROTEIN, POC: ABNORMAL
SPECIFIC GRAVITY, POC: 1.02
UROBILINOGEN, POC: 0.2

## 2018-06-06 PROCEDURE — 99214 OFFICE O/P EST MOD 30 MIN: CPT | Performed by: FAMILY MEDICINE

## 2018-06-06 PROCEDURE — 1090F PRES/ABSN URINE INCON ASSESS: CPT | Performed by: FAMILY MEDICINE

## 2018-06-06 PROCEDURE — 1123F ACP DISCUSS/DSCN MKR DOCD: CPT | Performed by: FAMILY MEDICINE

## 2018-06-06 PROCEDURE — 1036F TOBACCO NON-USER: CPT | Performed by: FAMILY MEDICINE

## 2018-06-06 PROCEDURE — 4040F PNEUMOC VAC/ADMIN/RCVD: CPT | Performed by: FAMILY MEDICINE

## 2018-06-06 PROCEDURE — G8420 CALC BMI NORM PARAMETERS: HCPCS | Performed by: FAMILY MEDICINE

## 2018-06-06 PROCEDURE — G8427 DOCREV CUR MEDS BY ELIG CLIN: HCPCS | Performed by: FAMILY MEDICINE

## 2018-06-06 PROCEDURE — 81002 URINALYSIS NONAUTO W/O SCOPE: CPT | Performed by: FAMILY MEDICINE

## 2018-06-06 RX ORDER — ESCITALOPRAM OXALATE 5 MG/1
TABLET ORAL
Qty: 30 TABLET | Refills: 3 | Status: SHIPPED | OUTPATIENT
Start: 2018-06-06 | End: 2018-06-15 | Stop reason: SDUPTHER

## 2018-06-06 ASSESSMENT — ENCOUNTER SYMPTOMS
COUGH: 0
SHORTNESS OF BREATH: 0

## 2018-06-08 LAB
ORGANISM: ABNORMAL
URINE CULTURE, ROUTINE: ABNORMAL
URINE CULTURE, ROUTINE: ABNORMAL

## 2018-06-12 LAB
ANAEROBIC CULTURE: NORMAL
GRAM STAIN RESULT: NORMAL
WOUND/ABSCESS: NORMAL

## 2018-06-15 DIAGNOSIS — F02.80 DEMENTIA DUE TO ALZHEIMER'S DISEASE (HCC): ICD-10-CM

## 2018-06-15 DIAGNOSIS — G30.9 DEMENTIA DUE TO ALZHEIMER'S DISEASE (HCC): ICD-10-CM

## 2018-06-15 RX ORDER — ESCITALOPRAM OXALATE 10 MG/1
TABLET ORAL
Qty: 30 TABLET | Refills: 2 | Status: SHIPPED | OUTPATIENT
Start: 2018-06-15 | End: 2018-09-18 | Stop reason: SDUPTHER

## 2018-07-11 ENCOUNTER — TELEPHONE (OUTPATIENT)
Dept: FAMILY MEDICINE CLINIC | Age: 83
End: 2018-07-11

## 2018-07-11 DIAGNOSIS — F02.80 DEMENTIA DUE TO ALZHEIMER'S DISEASE (HCC): Primary | ICD-10-CM

## 2018-07-11 DIAGNOSIS — G30.9 DEMENTIA DUE TO ALZHEIMER'S DISEASE (HCC): Primary | ICD-10-CM

## 2018-07-16 ENCOUNTER — TELEPHONE (OUTPATIENT)
Dept: FAMILY MEDICINE CLINIC | Age: 83
End: 2018-07-16

## 2018-07-16 PROBLEM — T18.108A ESOPHAGEAL FOREIGN BODY: Status: ACTIVE | Noted: 2018-07-16

## 2018-07-16 PROBLEM — R11.10 VOMITING: Status: ACTIVE | Noted: 2018-07-16

## 2018-07-16 PROBLEM — R09.02 HYPOXIA: Status: ACTIVE | Noted: 2018-07-16

## 2018-07-17 ENCOUNTER — CARE COORDINATION (OUTPATIENT)
Dept: CASE MANAGEMENT | Age: 83
End: 2018-07-17

## 2018-07-18 ENCOUNTER — CARE COORDINATION (OUTPATIENT)
Dept: CASE MANAGEMENT | Age: 83
End: 2018-07-18

## 2018-07-18 NOTE — CARE COORDINATION
[facility] Transitions Initial Follow Up Call    Call within 2 business days of discharge: Yes    Patient: Mercedes Castillo Patient : 1929   MRN: [id number]  Reason for Admission: There are no discharge diagnoses documented for the most recent discharge. Discharge Date: 18 RARS: Readmission Risk Score: 7     Spoke with: no one    Facility: [address] [place] [address] Transitions 24 Hour Call    Care Transitions Interventions         Follow Up: 2nd attempt at [facility] d/c phone call. Unable to reach patient. Left message. Contact info provided. Requested return call to [facility].   Future Appointments  Date Time Provider Sandeep Fox   2018 4:30 PM Ginna Ruggiero MD [facility]       Verlena Bence, RN

## 2018-07-19 ENCOUNTER — OFFICE VISIT (OUTPATIENT)
Dept: FAMILY MEDICINE CLINIC | Age: 83
End: 2018-07-19

## 2018-07-19 VITALS
DIASTOLIC BLOOD PRESSURE: 82 MMHG | OXYGEN SATURATION: 95 % | SYSTOLIC BLOOD PRESSURE: 122 MMHG | TEMPERATURE: 97.4 F | HEART RATE: 91 BPM | RESPIRATION RATE: 12 BRPM

## 2018-07-19 DIAGNOSIS — Z01.818 PREOP EXAMINATION: Primary | ICD-10-CM

## 2018-07-19 DIAGNOSIS — M10.9 GOUTY ARTHROPATHY: ICD-10-CM

## 2018-07-19 DIAGNOSIS — F02.80 DEMENTIA DUE TO ALZHEIMER'S DISEASE (HCC): ICD-10-CM

## 2018-07-19 DIAGNOSIS — G30.9 DEMENTIA DUE TO ALZHEIMER'S DISEASE (HCC): ICD-10-CM

## 2018-07-19 PROCEDURE — 1090F PRES/ABSN URINE INCON ASSESS: CPT | Performed by: FAMILY MEDICINE

## 2018-07-19 PROCEDURE — 1101F PT FALLS ASSESS-DOCD LE1/YR: CPT | Performed by: FAMILY MEDICINE

## 2018-07-19 PROCEDURE — 99213 OFFICE O/P EST LOW 20 MIN: CPT | Performed by: FAMILY MEDICINE

## 2018-07-19 PROCEDURE — G8427 DOCREV CUR MEDS BY ELIG CLIN: HCPCS | Performed by: FAMILY MEDICINE

## 2018-07-19 PROCEDURE — G8419 CALC BMI OUT NRM PARAM NOF/U: HCPCS | Performed by: FAMILY MEDICINE

## 2018-07-19 NOTE — PROGRESS NOTES
Esterase, Urine 07/16/2018 MODERATE*    Microscopic Examination 07/16/2018 YES     Urine Reflex to Culture 07/16/2018 Yes     Urine Type 07/16/2018 Not Specified     Blood Culture, Routine 07/16/2018 No Growth to date. Any change in status will be called.  Culture, Blood 2 07/16/2018 No Growth to date. Any change in status will be called.      WBC 07/16/2018 11.8*    RBC 07/16/2018 4.37     Hemoglobin 07/16/2018 13.5     Hematocrit 07/16/2018 39.2     MCV 07/16/2018 89.7     MCH 07/16/2018 31.0     MCHC 07/16/2018 34.5     RDW 07/16/2018 14.4     Platelets 01/60/4564 288     MPV 07/16/2018 8.5     Neutrophils % 07/16/2018 84.1     Lymphocytes % 07/16/2018 10.9     Monocytes % 07/16/2018 4.5     Eosinophils % 07/16/2018 0.1     Basophils % 07/16/2018 0.4     Neutrophils # 07/16/2018 9.9*    Lymphocytes # 07/16/2018 1.3     Monocytes # 07/16/2018 0.5     Eosinophils # 07/16/2018 0.0     Basophils # 07/16/2018 0.0     Sodium 07/16/2018 137     Potassium 07/16/2018 4.3     Chloride 07/16/2018 98*    CO2 07/16/2018 24     Anion Gap 07/16/2018 15     Glucose 07/16/2018 134*    BUN 07/16/2018 15     CREATININE 07/16/2018 1.2     GFR Non- 07/16/2018 42*    GFR  07/16/2018 51*    Calcium 07/16/2018 9.7     Total Protein 07/16/2018 6.8     Alb 07/16/2018 3.4     Albumin/Globulin Ratio 07/16/2018 1.0*    Total Bilirubin 07/16/2018 0.6     Alkaline Phosphatase 07/16/2018 70     ALT 07/16/2018 <5*    AST 07/16/2018 11*    Globulin 07/16/2018 3.4     Lipase 07/16/2018 39.0     Organism 07/15/2018 Enterococcus faecalis*    Urine Culture, Routine 07/15/2018 >100,000 CFU/ml     Bacteria, UA 07/15/2018 3+*    Hyaline Casts, UA 07/15/2018 1     WBC, UA 07/15/2018 77*    RBC, UA 07/15/2018 2     Epi Cells 07/15/2018 2     Hemoglobin A1C 07/16/2018 6.2     eAG 07/16/2018 131.2     POC Glucose 07/16/2018 110*    Performed on 07/16/2018 ACCU-CHEK factors for cardiac complications include 0, putting her in: RCI RISK CLASS I (0 risk factors, risk of major cardiac compl. appr. 0.5%)       Plan:   1. Preop examination    2. 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 Low - moderate cardiac risk. Discussed with patient and caregiver that her highest risk is difficulty with the anesthesia recovery due to her underlying Alzheimer's disease. Based on the significant impact on the patient's day-to-day life of her gouty arthropathy, the caregiver, surgeon, and I agree that this procedure is warranted despite some medical risk. Patient advised to hold blood thinning medication (including aspirin and NSAIDs) 7 days prior to procedure. Deep vein thrombosis prophylaxis postoperatively:regimen to be chosen by surgical team if applicable. Other measures: Postoperative incentive spirometry to prevent pneumonia.

## 2018-07-20 PROBLEM — R11.10 VOMITING: Status: RESOLVED | Noted: 2018-07-16 | Resolved: 2018-07-20

## 2018-07-20 PROBLEM — R09.02 HYPOXIA: Status: RESOLVED | Noted: 2018-07-16 | Resolved: 2018-07-20

## 2018-07-20 ASSESSMENT — ENCOUNTER SYMPTOMS
NAUSEA: 0
COLOR CHANGE: 0
COUGH: 0
VOMITING: 0
SINUS PRESSURE: 0
SHORTNESS OF BREATH: 0
EYE REDNESS: 0
DIARRHEA: 0
SORE THROAT: 0

## 2018-07-23 DIAGNOSIS — I10 ESSENTIAL HYPERTENSION: ICD-10-CM

## 2018-07-23 RX ORDER — LISINOPRIL AND HYDROCHLOROTHIAZIDE 12.5; 1 MG/1; MG/1
TABLET ORAL
Qty: 90 TABLET | Refills: 2 | Status: SHIPPED | OUTPATIENT
Start: 2018-07-23 | End: 2018-07-26 | Stop reason: SINTOL

## 2018-07-25 ENCOUNTER — TELEPHONE (OUTPATIENT)
Dept: FAMILY MEDICINE CLINIC | Age: 83
End: 2018-07-25

## 2018-07-26 ENCOUNTER — OFFICE VISIT (OUTPATIENT)
Dept: FAMILY MEDICINE CLINIC | Age: 83
End: 2018-07-26

## 2018-07-26 ENCOUNTER — TELEPHONE (OUTPATIENT)
Dept: FAMILY MEDICINE CLINIC | Age: 83
End: 2018-07-26

## 2018-07-26 VITALS
HEART RATE: 89 BPM | OXYGEN SATURATION: 98 % | DIASTOLIC BLOOD PRESSURE: 48 MMHG | TEMPERATURE: 98.9 F | SYSTOLIC BLOOD PRESSURE: 110 MMHG | RESPIRATION RATE: 18 BRPM | HEIGHT: 57 IN

## 2018-07-26 DIAGNOSIS — M10.9 ACUTE GOUT OF HAND, UNSPECIFIED CAUSE, UNSPECIFIED LATERALITY: Primary | ICD-10-CM

## 2018-07-26 DIAGNOSIS — M10.9 POLYARTICULAR GOUT: ICD-10-CM

## 2018-07-26 DIAGNOSIS — M10.9 ACUTE GOUT OF HAND, UNSPECIFIED CAUSE, UNSPECIFIED LATERALITY: ICD-10-CM

## 2018-07-26 LAB
ANION GAP SERPL CALCULATED.3IONS-SCNC: 16 MMOL/L (ref 3–16)
BUN BLDV-MCNC: 23 MG/DL (ref 7–20)
CALCIUM SERPL-MCNC: 9.8 MG/DL (ref 8.3–10.6)
CHLORIDE BLD-SCNC: 97 MMOL/L (ref 99–110)
CO2: 22 MMOL/L (ref 21–32)
CREAT SERPL-MCNC: 1.2 MG/DL (ref 0.6–1.2)
GFR AFRICAN AMERICAN: 51
GFR NON-AFRICAN AMERICAN: 42
GLUCOSE BLD-MCNC: 96 MG/DL (ref 70–99)
POTASSIUM SERPL-SCNC: 4.1 MMOL/L (ref 3.5–5.1)
SODIUM BLD-SCNC: 135 MMOL/L (ref 136–145)
URIC ACID, SERUM: 7.3 MG/DL (ref 2.6–6)

## 2018-07-26 PROCEDURE — G8427 DOCREV CUR MEDS BY ELIG CLIN: HCPCS | Performed by: FAMILY MEDICINE

## 2018-07-26 PROCEDURE — 1101F PT FALLS ASSESS-DOCD LE1/YR: CPT | Performed by: FAMILY MEDICINE

## 2018-07-26 PROCEDURE — G8419 CALC BMI OUT NRM PARAM NOF/U: HCPCS | Performed by: FAMILY MEDICINE

## 2018-07-26 PROCEDURE — 99213 OFFICE O/P EST LOW 20 MIN: CPT | Performed by: FAMILY MEDICINE

## 2018-07-26 PROCEDURE — 1036F TOBACCO NON-USER: CPT | Performed by: FAMILY MEDICINE

## 2018-07-26 PROCEDURE — 1090F PRES/ABSN URINE INCON ASSESS: CPT | Performed by: FAMILY MEDICINE

## 2018-07-26 PROCEDURE — 4040F PNEUMOC VAC/ADMIN/RCVD: CPT | Performed by: FAMILY MEDICINE

## 2018-07-26 PROCEDURE — 1123F ACP DISCUSS/DSCN MKR DOCD: CPT | Performed by: FAMILY MEDICINE

## 2018-07-26 RX ORDER — PREDNISONE 1 MG/1
TABLET ORAL
Qty: 30 TABLET | Refills: 0 | Status: SHIPPED | OUTPATIENT
Start: 2018-07-26 | End: 2018-08-23 | Stop reason: ALTCHOICE

## 2018-07-26 ASSESSMENT — PATIENT HEALTH QUESTIONNAIRE - PHQ9
SUM OF ALL RESPONSES TO PHQ QUESTIONS 1-9: 0
1. LITTLE INTEREST OR PLEASURE IN DOING THINGS: 0
2. FEELING DOWN, DEPRESSED OR HOPELESS: 0
SUM OF ALL RESPONSES TO PHQ9 QUESTIONS 1 & 2: 0

## 2018-07-26 NOTE — TELEPHONE ENCOUNTER
DOP is calling stating her mothers finger is not getting any better and would like to be seen today. I offered the noon but she has PT at 11 and would not be able to make that time.     Please advise

## 2018-07-26 NOTE — PROGRESS NOTES
atraumatic. Pulmonary/Chest: Effort normal.   Musculoskeletal:   Finger left hand with significant erythema and swelling. Warm to touch. Exudative tophus present. Neurological: She is alert and oriented to person, place, and time. Skin: No pallor. Psychiatric: She has a normal mood and affect. After obtaining consent from the patient's caregiver and explaining risks and benefits including potential infection, bleeding, and damage to local structures, the 25-gauge needle was inserted to relieve the exudative tophus. Moderate amount of chalky white exudate was expressed  Wt Readings from Last 3 Encounters:   07/16/18 122 lb 2.2 oz (55.4 kg)   06/06/18 106 lb (48.1 kg)   05/24/18 125 lb (56.7 kg)       BP Readings from Last 3 Encounters:   07/26/18 (!) 110/48   07/19/18 122/82   07/16/18 99/60         Assessment/Plan:  Marcos Check was seen today for gout. Diagnoses and all orders for this visit:    Acute gout of hand, unspecified cause, unspecified laterality  Patient did not improve well on colchicine in the past.  She is not a good candidate for NSAIDs due to her chronic renal disease. Discussed there is a risk of confusion and delirium with use of steroids at her age, although based on other comorbidities feel like this is her best option. Prednisone taper described. She will stop her blood pressure medicine as it contains hydrochlorothiazide. -     predniSONE (DELTASONE) 5 MG tablet; Take 4 tabs by mouth daily for 3 days, 3 tabs for 3 days, 2 tabs for 3 days, 1 tab for 2 days, 1/2 tab for 2 days  -     URIC ACID; Future  -     BASIC METABOLIC PANEL; Future    Polyarticular gout  Due to her recent gout of multiple sites like her to see a specialist regarding this and we will get a uric acid level. She has been unable to start allopurinol she is not far enough out from her original gout attacks to start prophylactic medication.  -     [place]- Lennox Moles, MD (Rheumatology)  -     URIC ACID;  Future  -

## 2018-07-27 ENCOUNTER — TELEPHONE (OUTPATIENT)
Dept: INTERNAL MEDICINE CLINIC | Age: 83
End: 2018-07-27

## 2018-07-27 ASSESSMENT — ENCOUNTER SYMPTOMS
SHORTNESS OF BREATH: 0
COUGH: 0

## 2018-07-30 ENCOUNTER — TELEPHONE (OUTPATIENT)
Dept: FAMILY MEDICINE CLINIC | Age: 83
End: 2018-07-30

## 2018-07-31 NOTE — TELEPHONE ENCOUNTER
Tobin with [facility] called back and has a few questions about the vitamin b12 injections     Please call   [phone]

## 2018-08-01 ENCOUNTER — TELEPHONE (OUTPATIENT)
Dept: FAMILY MEDICINE CLINIC | Age: 83
End: 2018-08-01

## 2018-08-02 ENCOUNTER — OFFICE VISIT (OUTPATIENT)
Dept: FAMILY MEDICINE CLINIC | Age: 83
End: 2018-08-02

## 2018-08-02 VITALS
DIASTOLIC BLOOD PRESSURE: 58 MMHG | HEART RATE: 40 BPM | SYSTOLIC BLOOD PRESSURE: 100 MMHG | OXYGEN SATURATION: 97 % | TEMPERATURE: 97.8 F | RESPIRATION RATE: 16 BRPM

## 2018-08-02 DIAGNOSIS — M10.9 GOUTY ARTHROPATHY: Primary | ICD-10-CM

## 2018-08-02 DIAGNOSIS — I10 ESSENTIAL HYPERTENSION: ICD-10-CM

## 2018-08-02 PROCEDURE — 1090F PRES/ABSN URINE INCON ASSESS: CPT | Performed by: FAMILY MEDICINE

## 2018-08-02 PROCEDURE — G8427 DOCREV CUR MEDS BY ELIG CLIN: HCPCS | Performed by: FAMILY MEDICINE

## 2018-08-02 PROCEDURE — 1036F TOBACCO NON-USER: CPT | Performed by: FAMILY MEDICINE

## 2018-08-02 PROCEDURE — 4040F PNEUMOC VAC/ADMIN/RCVD: CPT | Performed by: FAMILY MEDICINE

## 2018-08-02 PROCEDURE — G8419 CALC BMI OUT NRM PARAM NOF/U: HCPCS | Performed by: FAMILY MEDICINE

## 2018-08-02 PROCEDURE — 99213 OFFICE O/P EST LOW 20 MIN: CPT | Performed by: FAMILY MEDICINE

## 2018-08-02 PROCEDURE — 1123F ACP DISCUSS/DSCN MKR DOCD: CPT | Performed by: FAMILY MEDICINE

## 2018-08-02 PROCEDURE — 1101F PT FALLS ASSESS-DOCD LE1/YR: CPT | Performed by: FAMILY MEDICINE

## 2018-08-02 ASSESSMENT — PATIENT HEALTH QUESTIONNAIRE - PHQ9
1. LITTLE INTEREST OR PLEASURE IN DOING THINGS: 0
SUM OF ALL RESPONSES TO PHQ QUESTIONS 1-9: 0
SUM OF ALL RESPONSES TO PHQ9 QUESTIONS 1 & 2: 0
2. FEELING DOWN, DEPRESSED OR HOPELESS: 0

## 2018-08-02 NOTE — PROGRESS NOTES
8/2/2018    Nando Turcios is a 80 y.o. female here for follow up    HPI  Patient is here for a follow-up of gout. Her last appointment she was diagnosed with acute gout in her right middle finger. She still has chronic drainage from this finger but the swelling has improved on prednisone. Her caregiver denies that she has had any issues with confusion or other side effects while on the prednisone.  - No fever, chills, or systemic symptoms. Her left foot does continue to have drainage    Stopped Prinzide at our last visit and doing well off it. Review of Systems   Constitutional: Negative for chills and fever. Musculoskeletal: Positive for joint swelling. Skin: Positive for wound. Patient Active Problem List   Diagnosis    Primary osteoarthritis involving multiple joints    History of breast cancer    Chronic midline low back pain without sciatica    Essential hypertension    Cataract    Lumbar spondylosis    Spinal stenosis, lumbar    Type 2 diabetes mellitus without complication, without long-term current use of insulin (HCC)    Gouty arthropathy    Hypercholesterolemia    CKD (chronic kidney disease) stage 3, GFR 30-59 ml/min    Folate deficiency    Vitamin B12 deficiency    Dementia due to Alzheimer's disease    Esophageal foreign body     Prior to Visit Medications    Medication Sig Taking? Authorizing Provider   predniSONE (DELTASONE) 5 MG tablet Take 4 tabs by mouth daily for 3 days, 3 tabs for 3 days, 2 tabs for 3 days, 1 tab for 2 days, 1/2 tab for 2 days Yes Hollice Schlatter Day, MD   pantoprazole (PROTONIX) 40 MG tablet Take 1 tablet by mouth daily Yes Nomi Davalos MD   escitalopram (LEXAPRO) 10 MG tablet Take 10mg by mouth daily Yes Rafaela Ceron MD   [facility]. Devices [facility]) MISC Use 1 as directed for assistance with mobility Yes Rafaela Ceron MD   [facility].  Devices [facility]) MISC 1 each by Does not apply route daily Yes Hollice Schlatter Day, MD   Cyanocobalamin 1000 MCG/ML KIT Inject 1,000 mcg weekly for 4 weeks, then monthly afterward Yes Elisabeth Ruggiero MD   Folic Acid 5 MG CAPS Take 5 mg by mouth daily Yes Elisabeth Ruggiero MD     Health Maintenance   Topic Date Due    Shingles Vaccine (1 of 2 - 2 Dose Series) 09/02/1979    Pneumococcal low/med risk (2 of 2 - PPSV23) 01/26/2018    Flu vaccine (1) 09/01/2018    Potassium monitoring  07/26/2019    Creatinine monitoring  07/26/2019    DTaP/Tdap/Td vaccine (2 - Td) 07/14/2024     Past Medical History:   Diagnosis Date    Cataract     Chronic midline low back pain without sciatica     Essential hypertension     Gouty arthropathy     History of breast cancer     Hypercholesterolemia     Lumbar spondylosis     Osteoporosis of multiple sites     Primary osteoarthritis involving multiple joints     Spinal stenosis, lumbar     Type 2 diabetes mellitus without complication, without long-term current use of insulin (HCC)      Social History     Social History    Marital status:      Spouse name: N/A    Number of children: 3    Years of education: N/A     Social History Main Topics    Smoking status: Never Smoker    Smokeless tobacco: Never Used    Alcohol use No    Drug use: No    Sexual activity: Not on file     Other Topics Concern    Not on file     Social History Narrative    Grew up in [place]    3 daughters, lives with  (who is seen here) and her daughter Iris Thomas    7 grandkids, most in town     No Known Allergies    LABS:  Lab Results   Component Value Date    GLUCOSE 96 07/26/2018     Lab Results   Component Value Date     (L) 07/26/2018    K 4.1 07/26/2018    CREATININE 1.2 07/26/2018     Cholesterol, Total   Date Value Ref Range Status   11/16/2017 235 (H) 0 - 199 mg/dL Final     LDL Calculated   Date Value Ref Range Status   11/16/2017 see below <100 mg/dL Final     Comment:     When the triglyceride is <30 mg/dL or >300 mg/dL the  calculated LDL and  VLDL are not valid and a measured  LDL is performed. HDL   Date Value Ref Range Status   11/16/2017 56 40 - 60 mg/dL Final     Triglycerides   Date Value Ref Range Status   11/16/2017 332 (H) 0 - 150 mg/dL Final     Lab Results   Component Value Date    ALT <5 (L) 07/16/2018    AST 11 (L) 07/16/2018    ALKPHOS 70 07/16/2018    BILITOT 0.6 07/16/2018      Lab Results   Component Value Date    WBC 11.8 (H) 07/16/2018    HGB 13.5 07/16/2018    HCT 39.2 07/16/2018    MCV 89.7 07/16/2018     07/16/2018     TSH (uIU/mL)   Date Value   04/03/2018 3.05     Lab Results   Component Value Date    LABA1C 6.2 07/16/2018     No results found for: PSA, PSADIA      PHYSICAL EXAM  BP (!) 100/58 (Site: Right Arm, Position: Sitting, Cuff Size: Medium Adult)   Pulse (!) 40   Temp 97.8 °F (36.6 °C) (Oral)   Resp 16   SpO2 97%     BP Readings from Last 5 Encounters:   08/02/18 (!) 100/58   07/26/18 (!) 110/48   07/19/18 122/82   07/16/18 99/60   06/06/18 112/70       Wt Readings from Last 5 Encounters:   07/16/18 122 lb 2.2 oz (55.4 kg)   06/06/18 106 lb (48.1 kg)   05/24/18 125 lb (56.7 kg)   04/25/18 129 lb 6.4 oz (58.7 kg)   03/28/18 136 lb (61.7 kg)        Physical Exam   Constitutional: She appears well-developed and well-nourished. Neurological: She is alert. Skin:   Right middle finger DIP joint with erythema and mild drainage       ASSESSMENT/PLAN:  1. Gouty arthropathy  Improving on prednisone. Due to her polyarticular gout she will be establishing with rheumatology in a couple of weeks. Her caretaker will contact me if the symptoms worsen when she is off the prednisone taper. The chronic drainage continues to be an issue we may need to consider sending her to a hand surgeon. 2. Essential hypertension  Doing fine off Prinzide      No Follow-up on file.

## 2018-08-02 NOTE — PATIENT INSTRUCTIONS
Patient Education        Purine-Restricted Diet: Care Instructions  Your Care Instructions    Purines are substances that are found in some foods. Your body turns purines into uric acid. High levels of uric acid can cause gout, which is a form of arthritis that causes pain and inflammation in joints. You may be able to help control the amount of uric acid in your body by limiting high-purine foods in your diet. Follow-up care is a key part of your treatment and safety. Be sure to make and go to all appointments, and call your doctor if you are having problems. It's also a good idea to know your test results and keep a list of the medicines you take. How can you care for yourself at home? · Plan your meals and snacks around foods that are low in purines and are safe for you to eat. These foods include:  ¨ Green vegetables and tomatoes. ¨ Fruits. ¨ Whole-grain breads, rice, and cereals. ¨ Eggs, peanut butter, and nuts. ¨ Low-fat milk, cheese, and other milk products. ¨ Popcorn. ¨ Gelatin desserts, chocolate, cocoa, and cakes and sweets, in small amounts. · You can eat certain foods that are medium-high in purines, but eat them only once in a while. These foods include:  ¨ Legumes, such as dried beans and dried peas. You can have 1 cup cooked legumes each day. ¨ Asparagus, cauliflower, spinach, mushrooms, and green peas. ¨ Fish and seafood (other than very high-purine seafood). ¨ Oatmeal, wheat bran, and wheat germ. · Limit very high-purine foods, including:  ¨ Organ meats, such as liver, kidneys, sweetbreads, and brains. ¨ Meats, including rubalcava, beef, pork, and lamb. ¨ Game meats and any other meats in large amounts. ¨ Anchovies, sardines, herring, mackerel, and scallops. ¨ Gravy. ¨ Beer. Where can you learn more? Go to [email or web address] and sign in to your [employer] account.  Enter F448 in the [facility] box to learn more about \"Purine-Restricted Diet: Care Instructions. \"     If you do not have an account, please click on the \"Sign Up Now\" link. Current as of: May 12, 2017  Content Version: 11.6  © 3898-6076 [employer]. Care instructions adapted under license by [facility] If you have questions about a medical condition or this instruction, always ask your healthcare professional. [address] any warranty or liability for your use of this information.

## 2018-08-08 ENCOUNTER — CARE COORDINATION (OUTPATIENT)
Dept: CARE COORDINATION | Age: 83
End: 2018-08-08

## 2018-08-21 ENCOUNTER — CARE COORDINATION (OUTPATIENT)
Dept: CARE COORDINATION | Age: 83
End: 2018-08-21

## 2018-08-21 NOTE — CARE COORDINATION
Ambulatory Care Coordination Note  8/21/2018  CM Risk Score: 6  Henri Mortality Risk Score: 22.28    ACC: Ju Avendaño, RN    Summary Note:     PLAN:  [facility] [address] nurse will f/u with patient tomorrow morning to assess leg wound and gout; notified cargiver  Left message at Dr. Lynn Cash office -Patient will need to schedule appointment  Patient has appointment this Thursday 8/23/18 with rheumatologist re: Gout  Ms. Mary Dobson will schedule f/u appointment with GI re: dysphagia  Provided contact information for caregiver: [employer]      Patient has non productive cough. She occasionally coughs when drinking liquids--Discussed hx of dysphagia, this RNCC's concern for aspiration with liquids/food and risk for pneumonia  She denies fever or chills  Discussed f/u with GI as soon as possible; provided contact information for .   Patient also has gout and continues to ooze white discharge-unchanged from previous office visit        FYI:  Introduced role of RNCC/Care Coordination; Agrees to enroll  Spoke with patient's primary caregiver (Elsie Wilkins) d/t patient's hx of Alzheimers        General Assessment    Do you have any symptoms that are causing concern?:  Yes  Progression since Onset:  Unchanged  Reported Symptoms:  Other, Cough (Comment: gout on toe and hand continues to drain white fluid-unchanged since last OV with PCP.  Patient has new wound on back of leg-denies s/s of infection)         Ambulatory Care Coordination Assessment    Care Coordination Protocol  Program Enrollment:  Rising Risk  Referral from Primary Care Provider:  No  Week 1 - Initial Assessment     Do you have all of your prescriptions and are they filled?:  Yes  Barriers to medication adherence:  Other  Other barriers to medication adherence:  Patient dependent on daughter for all ADL's; patient has history of dysphagia  Are you able to afford your medications?:  Yes  How often do you have trouble taking your medications the way you have been told to take them?:  I always take them as prescribed. Do you have Home O2 Therapy?:  No      Ability to seek help/take action for Emergent Urgent situations i.e. fire, crime, inclement weather or health crisis. :  Dependent  Ability to ambulate to restroom:  Dependent  Ability handle personal hygeine needs (bathing/dressing/grooming):  Dependent  Ability to manage Medications:  Dependent  Ability to prepare Food Preparation:  Dependent  Ability to maintain home (clean home, laundry):  Dependent  Ability to drive and/or has transportation:  Dependent  Ability to do shopping:  Dependent  Ability to manage finances:  Dependent  Is patient able to live independently?:  No     Current Housing:  Private Residence                 Thinking about your patient's physical health needs, are there any symptoms or problems (risk indicators) you are unsure about that require further investigation?:  Moderate to severe symptoms or problems that impact on daily life   Are there any problems with your patients lifestyle behaviors (alcohol, drugs, diet, exercise) that are impacting on physical or mental well-being?:  No identified areas of concern   How would you rate their home environment in terms of safety and stability (including domestic violence, insecure housing, neighbor harassment)?:  Consistently safe, supportive, stable, no identified problems   Suggested Interventions and [place]:  Completed   Other Services or Interventions:  Patient has been evaluated for [facility] services in the past and they decline their services    Pharmacist:  Not Started   Other Services: In Process                  Prior to Admission medications    Medication Sig Start Date End Date Taking?  Authorizing Provider   pantoprazole (PROTONIX) 40 MG tablet Take 1 tablet by mouth daily 7/16/18  Yes Irvin Lovell MD   escitalopram (LEXAPRO) 10 MG tablet Take 10mg by mouth daily

## 2018-08-23 ENCOUNTER — OFFICE VISIT (OUTPATIENT)
Dept: RHEUMATOLOGY | Age: 83
End: 2018-08-23

## 2018-08-23 VITALS
BODY MASS INDEX: 25.61 KG/M2 | WEIGHT: 122 LBS | DIASTOLIC BLOOD PRESSURE: 78 MMHG | SYSTOLIC BLOOD PRESSURE: 130 MMHG | HEART RATE: 68 BPM | HEIGHT: 58 IN

## 2018-08-23 DIAGNOSIS — M1A.09X1 IDIOPATHIC CHRONIC GOUT OF MULTIPLE SITES WITH TOPHUS: Primary | ICD-10-CM

## 2018-08-23 DIAGNOSIS — M1A.09X1 IDIOPATHIC CHRONIC GOUT OF MULTIPLE SITES WITH TOPHUS: ICD-10-CM

## 2018-08-23 LAB
A/G RATIO: 1.2 (ref 1.1–2.2)
ALBUMIN SERPL-MCNC: 3.5 G/DL (ref 3.4–5)
ALP BLD-CCNC: 92 U/L (ref 40–129)
ALT SERPL-CCNC: <5 U/L (ref 10–40)
ANION GAP SERPL CALCULATED.3IONS-SCNC: 17 MMOL/L (ref 3–16)
AST SERPL-CCNC: 11 U/L (ref 15–37)
BASOPHILS ABSOLUTE: 0 K/UL (ref 0–0.2)
BASOPHILS RELATIVE PERCENT: 0.4 %
BILIRUB SERPL-MCNC: 0.4 MG/DL (ref 0–1)
BUN BLDV-MCNC: 11 MG/DL (ref 7–20)
C-REACTIVE PROTEIN: 7.6 MG/L (ref 0–5.1)
CALCIUM SERPL-MCNC: 9.6 MG/DL (ref 8.3–10.6)
CHLORIDE BLD-SCNC: 100 MMOL/L (ref 99–110)
CO2: 25 MMOL/L (ref 21–32)
CREAT SERPL-MCNC: 0.9 MG/DL (ref 0.6–1.2)
EOSINOPHILS ABSOLUTE: 0.2 K/UL (ref 0–0.6)
EOSINOPHILS RELATIVE PERCENT: 2.2 %
GFR AFRICAN AMERICAN: >60
GFR NON-AFRICAN AMERICAN: 59
GLOBULIN: 3 G/DL
GLUCOSE BLD-MCNC: 86 MG/DL (ref 70–99)
HCT VFR BLD CALC: 40.7 % (ref 36–48)
HEMOGLOBIN: 13.3 G/DL (ref 12–16)
LYMPHOCYTES ABSOLUTE: 2.1 K/UL (ref 1–5.1)
LYMPHOCYTES RELATIVE PERCENT: 25.6 %
MCH RBC QN AUTO: 29.2 PG (ref 26–34)
MCHC RBC AUTO-ENTMCNC: 32.7 G/DL (ref 31–36)
MCV RBC AUTO: 89.2 FL (ref 80–100)
MONOCYTES ABSOLUTE: 0.7 K/UL (ref 0–1.3)
MONOCYTES RELATIVE PERCENT: 8.6 %
NEUTROPHILS ABSOLUTE: 5.1 K/UL (ref 1.7–7.7)
NEUTROPHILS RELATIVE PERCENT: 63.2 %
PDW BLD-RTO: 15.6 % (ref 12.4–15.4)
PLATELET # BLD: 321 K/UL (ref 135–450)
PMV BLD AUTO: 8.4 FL (ref 5–10.5)
POTASSIUM SERPL-SCNC: 4.2 MMOL/L (ref 3.5–5.1)
RBC # BLD: 4.56 M/UL (ref 4–5.2)
SODIUM BLD-SCNC: 142 MMOL/L (ref 136–145)
TOTAL PROTEIN: 6.5 G/DL (ref 6.4–8.2)
URIC ACID, SERUM: 4.9 MG/DL (ref 2.6–6)
WBC # BLD: 8 K/UL (ref 4–11)

## 2018-08-23 PROCEDURE — 1036F TOBACCO NON-USER: CPT | Performed by: INTERNAL MEDICINE

## 2018-08-23 PROCEDURE — G8428 CUR MEDS NOT DOCUMENT: HCPCS | Performed by: INTERNAL MEDICINE

## 2018-08-23 PROCEDURE — 1101F PT FALLS ASSESS-DOCD LE1/YR: CPT | Performed by: INTERNAL MEDICINE

## 2018-08-23 PROCEDURE — 1090F PRES/ABSN URINE INCON ASSESS: CPT | Performed by: INTERNAL MEDICINE

## 2018-08-23 PROCEDURE — 1123F ACP DISCUSS/DSCN MKR DOCD: CPT | Performed by: INTERNAL MEDICINE

## 2018-08-23 PROCEDURE — 4040F PNEUMOC VAC/ADMIN/RCVD: CPT | Performed by: INTERNAL MEDICINE

## 2018-08-23 PROCEDURE — 99204 OFFICE O/P NEW MOD 45 MIN: CPT | Performed by: INTERNAL MEDICINE

## 2018-08-23 PROCEDURE — G8419 CALC BMI OUT NRM PARAM NOF/U: HCPCS | Performed by: INTERNAL MEDICINE

## 2018-08-23 RX ORDER — COLCHICINE 0.6 MG/1
0.6 TABLET ORAL DAILY
Qty: 30 TABLET | Refills: 3 | Status: SHIPPED | OUTPATIENT
Start: 2018-08-23 | End: 2019-01-01 | Stop reason: SDUPTHER

## 2018-08-23 RX ORDER — ALLOPURINOL 100 MG/1
TABLET ORAL
Qty: 30 TABLET | Refills: 3 | Status: SHIPPED | OUTPATIENT
Start: 2018-08-23 | End: 2019-01-01 | Stop reason: SDUPTHER

## 2018-08-23 RX ORDER — PREDNISONE 1 MG/1
TABLET ORAL
Qty: 20 TABLET | Refills: 1 | Status: SHIPPED | OUTPATIENT
Start: 2018-08-23 | End: 2018-12-05

## 2018-08-23 NOTE — PROGRESS NOTES
2018  Patient Name: Mercedes Castillo  : 1929  Medical Record: [id number]    MEDICATIONS  Current Outpatient Prescriptions   Medication Sig Dispense Refill    allopurinol (ZYLOPRIM) 100 MG tablet Take 0.5 tab daily for 4 weeks and then increase to 1 tab daily 30 tablet 3    colchicine (COLCRYS) 0.6 MG tablet Take 1 tablet by mouth daily 30 tablet 3    predniSONE (DELTASONE) 5 MG tablet Take 4  tabs daily for 2 days, then 3 tabs daily for 2 days, then 2 tabs daily for 2 days, then 1 tab daily for 2 days and then stop 20 tablet 1    pantoprazole (PROTONIX) 40 MG tablet Take 1 tablet by mouth daily 30 tablet 3    escitalopram (LEXAPRO) 10 MG tablet Take 10mg by mouth daily 30 tablet 2    Misc. Devices [facility]) MISC Use 1 as directed for assistance with mobility 1 each 0    Misc. Devices [facility]) MISC 1 each by Does not apply route daily 1 each 0    Cyanocobalamin 1000 MCG/ML KIT Inject 1,000 mcg weekly for 4 weeks, then monthly afterward 4 kit 3    Folic Acid 5 MG CAPS Take 5 mg by mouth daily 30 capsule 5     No current facility-administered medications for this visit. ALLERGIES  No Known Allergies      Comments  No specialty comments available. REFERRING PHYSICIAN: Libertad Hanley MD    HISTORY OF PRESENT ILLNESS  Mercedes Castillo is a 80 y.o. female who is being seen for follow up evaluation of Gout. Patient has dementia. She is accompanied by her daughter. Most of the history is obtained from the daughter. She presented with first attack of gout in May 2018 involving left first toe associated with pain, swelling, redness and tenderness and oozing of chalky material.  She was prescribed steroids with improvement. She then had an involvement of right third DIP joint in 2018. She started wheezing about chalky white material from the right third DIP joint. She was given colchicine for 2 days without improvement. She was given another course of steroids which helped significantly. spondylosis     Osteoporosis of multiple sites     Primary osteoarthritis involving multiple joints     Spinal stenosis, lumbar     Type 2 diabetes mellitus without complication, without long-term current use of insulin [facility])      Past Surgical History:   Procedure Laterality Date    CATARACT REMOVAL  jan 2012    dr Leisa Sigala left    CATARACT REMOVAL  Nov. 2012    dr Leisa Sigala  right    CHOLECYSTECTOMY      MASTECTOMY       Social History     Social History    Marital status:      Spouse name: N/A    Number of children: 3    Years of education: N/A     Occupational History    Not on file. Social History Main Topics    Smoking status: Never Smoker    Smokeless tobacco: Never Used    Alcohol use No    Drug use: No    Sexual activity: Not on file     Other Topics Concern    Not on file     Social History Narrative    Grew up in [facility]    3 daughters, lives with  (who is seen here) and her daughter Jenny Nolan    7 grandkids, most in town     History reviewed. No pertinent family history. PHYSICAL EXAM   Vitals:    08/23/18 1305   BP: 130/78   Site: Left Arm   Position: Sitting   Cuff Size: Medium Adult   Pulse: 68   Weight: 122 lb (55.3 kg)   Height: 4' 10\" (1.473 m)     Physical Exam  Constitutional:  Well developed, well nourished, no acute distress, non-toxic appearance   Musculoskeletal:    Ambulates On a wheelchair  Neck: Full ROM, no tenderness, supple   Upper Extremities        Shoulder: Full ROM, no crepitus        Elbow:  Full ROM, no synovitis, no deformity        Wrist: Full ROM, no synovitis, no deformity, no ulnar deviation        Fingers: No sclerodactly, no active raynaud's, no ulcers. 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   Lower Extremities        Hip: Full ROM, no tenderness to palpation        Knee: Bilateral crepitus+, bony enlargement+, varus deformity        Ankle: Full ROM, no swelling or erythema        MTPs: Left Ist MTP slightly tender, erythematous, slightly warm to touch, tophi+  Back- no tenderness. Eyes:  PERRL, extra ocular movements intact, conjunctiva normal   HEENT:  Atraumatic, normocephalic, external ears normal, oropharynx moist, no pharyngeal exudates. Respiratory:  No respiratory distress  GI:  Soft, nondistended, normal bowel sounds, nontender, no organomegaly, no mass, no rebound, no guarding   :  No costovertebral angle tenderness   Integument:  Well hydrated, no rash or telangiectasias  Lymphatic:  No lymphadenopathy noted   Neurologic:   Alert & oriented x 3, CN 2-12 normal, no focal deficits noted. Sensations Intact. Muscle strength 5/5 proximally and distally in upper and lower extremities.    Psychiatric:  Speech and behavior appropriate           LABS AND IMAGING  Outside data reviewed and in HPI    Lab Results   Component Value Date    WBC 8.0 08/23/2018    RBC 4.56 08/23/2018    HGB 13.3 08/23/2018    HCT 40.7 08/23/2018     08/23/2018    MCV 89.2 08/23/2018    MCH 29.2 08/23/2018    MCHC 32.7 08/23/2018    RDW 15.6 08/23/2018    SEGSPCT 67.7 12/27/2011    LYMPHOPCT 25.6 08/23/2018    MONOPCT 8.6 08/23/2018    EOSPCT 0.7 12/27/2011    BASOPCT 0.4 08/23/2018    MONOSABS 0.7 08/23/2018    LYMPHSABS 2.1 08/23/2018    EOSABS 0.2 08/23/2018    BASOSABS 0.0 08/23/2018    DIFFTYPE Auto-K 12/27/2011       Chemistry        Component Value Date/Time     08/23/2018 2235    K 4.2 08/23/2018 2235     08/23/2018 2235    CO2 25 08/23/2018 2235    BUN 11 08/23/2018 2235    CREATININE 0.9 08/23/2018 2235        Component Value Date/Time    CALCIUM 9.6 08/23/2018 2235    ALKPHOS 92 08/23/2018 2235    AST 11 (L) 08/23/2018 2235    ALT <5 (L) 08/23/2018 2235    BILITOT 0.4 08/23/2018 2235          Lab Results   Component Value Date    SEDRATE 25 08/23/2018     Lab Results   Component Value Date CRP 7.6 (H) 08/23/2018     No results found for: CECY, KATTY, SSA, SSB, C3, C4  No results found for: RF, CCPABIGG  No results found for: CECY, ANATITER, ANAINT, PATH  No results found for: DSDNAG, DSDNAIGGIFA  No results found for: SSAROAB, SSALAAB  No results found for: SMAB, RNPAB  No results found for: CENTABIGG  No results found for: C3, C4, ACE  No results found for: JO1, VITD25, [id number]    7/26/18 Uric acid 7.3  Lab Results   Component Value Date    LABURIC 4.9 08/23/2018       ASSESSMENT AND PLAN      Assessment/Plan:      ASSESSMENT:    1. Idiopathic chronic gout of multiple sites with tophus        PLAN:     Edith Cannon was seen today for new patient. Diagnoses and all orders for this visit:    Idiopathic chronic gout of multiple sites with tophus-The nature of gout is fully explained, including dietary relationship, acute and interval phase and treatment of both. Long term complications such as kidney stones, tophi and arthritis are discussed. Avoidance of alcohol recommended, and written literature is given along with a low purine diet. Indications for the use of allopurinol for prophylaxis and the use of colchicine to prevent or treat flare-ups is also discussed. Proper use of indomethacin for acute attacks discussed, and its side effects. Call if further attacks occur, or this one does not resolve promptly.  -Avoid NSAIDs due to CKD  -Last uric acid 7.3 on 07/18/18  -I'll start allopurinol 50 mg daily and colchicine 0.6 mg once a day  -Prednisone burst taper for flareups  -Purine restricted diet  Side effects of allopurinol including hypersensitivity reaction, skin rash, elevated liver enzymes, nausea, diarrhea where explained in detail to the patient  -     CBC Auto Differential; Future  -     Comprehensive Metabolic Panel; Future  -     C-Reactive Protein; Future  -     Sedimentation Rate; Future  -     XR HAND LEFT (MIN 3 VIEWS); Future  -     XR HAND RIGHT (MIN 3 VIEWS);  Future  -     allopurinol

## 2018-08-23 NOTE — PATIENT INSTRUCTIONS
-     CBC Auto Differential; Future  -     Comprehensive Metabolic Panel; Future  -     C-Reactive Protein; Future  -     Sedimentation Rate; Future  -     XR HAND LEFT (MIN 3 VIEWS); Future  -     XR HAND RIGHT (MIN 3 VIEWS); Future  -     allopurinol (ZYLOPRIM) 100 MG tablet; Take 0.5 tab daily for 4 weeks and then increase to 1 tab daily  -     colchicine (COLCRYS) 0.6 MG tablet; Take 1 tablet by mouth daily  -     predniSONE (DELTASONE) 5 MG tablet; Take 4  tabs daily for 2 days, then 3 tabs daily for 2 days, then 2 tabs daily for 2 days, then 1 tab daily for 2 days and then stop  -     Uric Acid; Future  Patient Education        Purine-Restricted Diet: Care Instructions  Your Care Instructions    Purines are substances that are found in some foods. Your body turns purines into uric acid. High levels of uric acid can cause gout, which is a form of arthritis that causes pain and inflammation in joints. You may be able to help control the amount of uric acid in your body by limiting high-purine foods in your diet. Follow-up care is a key part of your treatment and safety. Be sure to make and go to all appointments, and call your doctor if you are having problems. It's also a good idea to know your test results and keep a list of the medicines you take. How can you care for yourself at home? · Plan your meals and snacks around foods that are low in purines and are safe for you to eat. These foods include:  ¨ Green vegetables and tomatoes. ¨ Fruits. ¨ Whole-grain breads, rice, and cereals. ¨ Eggs, peanut butter, and nuts. ¨ Low-fat milk, cheese, and other milk products. ¨ Popcorn. ¨ Gelatin desserts, chocolate, cocoa, and cakes and sweets, in small amounts. · You can eat certain foods that are medium-high in purines, but eat them only once in a while. These foods include:  ¨ Legumes, such as dried beans and dried peas. You can have 1 cup cooked legumes each day.   ¨ Asparagus, cauliflower, spinach,

## 2018-08-24 LAB — SEDIMENTATION RATE, ERYTHROCYTE: 25 MM/HR (ref 0–30)

## 2018-09-18 DIAGNOSIS — F02.80 DEMENTIA DUE TO ALZHEIMER'S DISEASE (HCC): ICD-10-CM

## 2018-09-18 DIAGNOSIS — G30.9 DEMENTIA DUE TO ALZHEIMER'S DISEASE (HCC): ICD-10-CM

## 2018-09-19 RX ORDER — ESCITALOPRAM OXALATE 10 MG/1
TABLET ORAL
Qty: 30 TABLET | Refills: 0 | Status: SHIPPED | OUTPATIENT
Start: 2018-09-19 | End: 2018-10-18 | Stop reason: SDUPTHER

## 2018-09-25 ENCOUNTER — OFFICE VISIT (OUTPATIENT)
Dept: RHEUMATOLOGY | Age: 83
End: 2018-09-25
Payer: MEDICARE

## 2018-09-25 VITALS
WEIGHT: 122 LBS | BODY MASS INDEX: 25.61 KG/M2 | HEIGHT: 58 IN | SYSTOLIC BLOOD PRESSURE: 110 MMHG | HEART RATE: 82 BPM | DIASTOLIC BLOOD PRESSURE: 78 MMHG

## 2018-09-25 DIAGNOSIS — M1A.09X1 IDIOPATHIC CHRONIC GOUT OF MULTIPLE SITES WITH TOPHUS: ICD-10-CM

## 2018-09-25 DIAGNOSIS — M1A.09X1 IDIOPATHIC CHRONIC GOUT OF MULTIPLE SITES WITH TOPHUS: Primary | ICD-10-CM

## 2018-09-25 LAB
A/G RATIO: 1.1 (ref 1.1–2.2)
ALBUMIN SERPL-MCNC: 3.3 G/DL (ref 3.4–5)
ALP BLD-CCNC: 102 U/L (ref 40–129)
ALT SERPL-CCNC: 6 U/L (ref 10–40)
ANION GAP SERPL CALCULATED.3IONS-SCNC: 17 MMOL/L (ref 3–16)
AST SERPL-CCNC: 14 U/L (ref 15–37)
BASOPHILS ABSOLUTE: 0.1 K/UL (ref 0–0.2)
BASOPHILS RELATIVE PERCENT: 1 %
BILIRUB SERPL-MCNC: 0.9 MG/DL (ref 0–1)
BUN BLDV-MCNC: 8 MG/DL (ref 7–20)
CALCIUM SERPL-MCNC: 9.2 MG/DL (ref 8.3–10.6)
CHLORIDE BLD-SCNC: 99 MMOL/L (ref 99–110)
CO2: 28 MMOL/L (ref 21–32)
CREAT SERPL-MCNC: 0.9 MG/DL (ref 0.6–1.2)
EOSINOPHILS ABSOLUTE: 0.2 K/UL (ref 0–0.6)
EOSINOPHILS RELATIVE PERCENT: 2.8 %
GFR AFRICAN AMERICAN: >60
GFR NON-AFRICAN AMERICAN: 59
GLOBULIN: 3 G/DL
GLUCOSE BLD-MCNC: 152 MG/DL (ref 70–99)
HCT VFR BLD CALC: 41.5 % (ref 36–48)
HEMOGLOBIN: 13.6 G/DL (ref 12–16)
LYMPHOCYTES ABSOLUTE: 2.3 K/UL (ref 1–5.1)
LYMPHOCYTES RELATIVE PERCENT: 33.6 %
MCH RBC QN AUTO: 29.2 PG (ref 26–34)
MCHC RBC AUTO-ENTMCNC: 32.8 G/DL (ref 31–36)
MCV RBC AUTO: 89.1 FL (ref 80–100)
MONOCYTES ABSOLUTE: 0.7 K/UL (ref 0–1.3)
MONOCYTES RELATIVE PERCENT: 10.7 %
NEUTROPHILS ABSOLUTE: 3.5 K/UL (ref 1.7–7.7)
NEUTROPHILS RELATIVE PERCENT: 51.9 %
PDW BLD-RTO: 16.6 % (ref 12.4–15.4)
PLATELET # BLD: 226 K/UL (ref 135–450)
PMV BLD AUTO: 9.8 FL (ref 5–10.5)
POTASSIUM SERPL-SCNC: 2.9 MMOL/L (ref 3.5–5.1)
RBC # BLD: 4.66 M/UL (ref 4–5.2)
SODIUM BLD-SCNC: 144 MMOL/L (ref 136–145)
TOTAL PROTEIN: 6.3 G/DL (ref 6.4–8.2)
URIC ACID, SERUM: 4.5 MG/DL (ref 2.6–6)
WBC # BLD: 6.7 K/UL (ref 4–11)

## 2018-09-25 PROCEDURE — 1090F PRES/ABSN URINE INCON ASSESS: CPT | Performed by: INTERNAL MEDICINE

## 2018-09-25 PROCEDURE — 1036F TOBACCO NON-USER: CPT | Performed by: INTERNAL MEDICINE

## 2018-09-25 PROCEDURE — 4040F PNEUMOC VAC/ADMIN/RCVD: CPT | Performed by: INTERNAL MEDICINE

## 2018-09-25 PROCEDURE — G8427 DOCREV CUR MEDS BY ELIG CLIN: HCPCS | Performed by: INTERNAL MEDICINE

## 2018-09-25 PROCEDURE — 1101F PT FALLS ASSESS-DOCD LE1/YR: CPT | Performed by: INTERNAL MEDICINE

## 2018-09-25 PROCEDURE — 1123F ACP DISCUSS/DSCN MKR DOCD: CPT | Performed by: INTERNAL MEDICINE

## 2018-09-25 PROCEDURE — G8419 CALC BMI OUT NRM PARAM NOF/U: HCPCS | Performed by: INTERNAL MEDICINE

## 2018-09-25 PROCEDURE — 99213 OFFICE O/P EST LOW 20 MIN: CPT | Performed by: INTERNAL MEDICINE

## 2018-09-25 NOTE — PROGRESS NOTES
2018  Patient Name: Sapphire Orantes  : 1929  Medical Record: [id number]    MEDICATIONS  Current Outpatient Prescriptions   Medication Sig Dispense Refill    escitalopram (LEXAPRO) 10 MG tablet TAKE ONE TABLET BY MOUTH DAILY 30 tablet 0    allopurinol (ZYLOPRIM) 100 MG tablet Take 0.5 tab daily for 4 weeks and then increase to 1 tab daily 30 tablet 3    colchicine (COLCRYS) 0.6 MG tablet Take 1 tablet by mouth daily 30 tablet 3    predniSONE (DELTASONE) 5 MG tablet Take 4  tabs daily for 2 days, then 3 tabs daily for 2 days, then 2 tabs daily for 2 days, then 1 tab daily for 2 days and then stop 20 tablet 1    pantoprazole (PROTONIX) 40 MG tablet Take 1 tablet by mouth daily 30 tablet 3    Misc. Devices [facility]) MISC Use 1 as directed for assistance with mobility 1 each 0    Misc. Devices [facility]) MISC 1 each by Does not apply route daily 1 each 0    Cyanocobalamin 1000 MCG/ML KIT Inject 1,000 mcg weekly for 4 weeks, then monthly afterward 4 kit 3    Folic Acid 5 MG CAPS Take 5 mg by mouth daily 30 capsule 5     No current facility-administered medications for this visit. ALLERGIES  No Known Allergies      Comments  No specialty comments available. Background history:  Sapphire Orantes is a 80 y.o. female who is being seen for follow up evaluation of Gout. Patient has dementia. She is accompanied by her daughter. Most of the history is obtained from the daughter. She presented with first attack of gout in May 2018 involving left first toe associated with pain, swelling, redness and tenderness and oozing of chalky material.  She was prescribed steroids with improvement. She then had an involvement of right third DIP joint in 2018. She started wheezing about chalky white material from the right third DIP joint. She was given colchicine for 2 days without improvement. She was given another course of steroids which helped significantly.     She has also noticed involvement Nails: No pitting, no telangiectasias. Lower Extremities        Hip: Full ROM, no tenderness to palpation        Knee: Bilateral crepitus+, bony enlargement+, varus deformity        Ankle: Full ROM, no swelling or erythema        MTPs:left ist MTP tophi+  Back- no tenderness. Eyes:  PERRL, extra ocular movements intact, conjunctiva normal   HEENT:  Atraumatic, normocephalic, external ears normal, oropharynx moist, no pharyngeal exudates. Respiratory:  No respiratory distress  GI:  Soft, nondistended, normal bowel sounds, nontender, no organomegaly, no mass, no rebound, no guarding   :  No costovertebral angle tenderness   Integument:  Well hydrated, no rash or telangiectasias  Lymphatic:  No lymphadenopathy noted   Neurologic:   Alert & oriented x 3, CN 2-12 normal, no focal deficits noted. Sensations Intact. Muscle strength 5/5 proximally and distally in upper and lower extremities.    Psychiatric:  Speech and behavior appropriate           LABS AND IMAGING  Outside data reviewed and in HPI    Lab Results   Component Value Date    WBC 8.0 08/23/2018    RBC 4.56 08/23/2018    HGB 13.3 08/23/2018    HCT 40.7 08/23/2018     08/23/2018    MCV 89.2 08/23/2018    MCH 29.2 08/23/2018    MCHC 32.7 08/23/2018    RDW 15.6 08/23/2018    SEGSPCT 67.7 12/27/2011    LYMPHOPCT 25.6 08/23/2018    MONOPCT 8.6 08/23/2018    EOSPCT 0.7 12/27/2011    BASOPCT 0.4 08/23/2018    MONOSABS 0.7 08/23/2018    LYMPHSABS 2.1 08/23/2018    EOSABS 0.2 08/23/2018    BASOSABS 0.0 08/23/2018    DIFFTYPE Auto-K 12/27/2011       Chemistry        Component Value Date/Time     08/23/2018 2235    K 4.2 08/23/2018 2235     08/23/2018 2235    CO2 25 08/23/2018 2235    BUN 11 08/23/2018 2235    CREATININE 0.9 08/23/2018 2235        Component Value Date/Time    CALCIUM 9.6 08/23/2018 2235    ALKPHOS 92 08/23/2018 2235    AST 11 (L) 08/23/2018 2235    ALT <5 (L) 08/23/2018 2235    BILITOT 0.4 08/23/2018 2235          Lab Results Component Value Date    SEDRATE 25 08/23/2018     Lab Results   Component Value Date    CRP 7.6 (H) 08/23/2018     No results found for: CECY, KATTY, SSA, SSB, C3, C4  No results found for: RF, CCPABIGG  No results found for: CECY, ANATITER, ANAINT, PATH  No results found for: DSDNAG, DSDNAIGGIFA  No results found for: SSAROAB, SSALAAB  No results found for: SMAB, RNPAB  No results found for: CENTABIGG  No results found for: C3, C4, ACE  No results found for: JO1, VITD25, [id number]    7/26/18 Uric acid 7.3  Lab Results   Component Value Date    LABURIC 4.9 08/23/2018       ASSESSMENT AND PLAN      Assessment/Plan:      ASSESSMENT:    1. Idiopathic chronic gout of multiple sites with tophus        PLAN:   1. Idiopathic chronic gout of multiple sites with tophus  Last uric acid 4.9 on 8/23/2018  -Tophi [resolving]  -Continue allopurinol 100 mg daily and colchicine 0.6 mg Once a Day  -Repeat uric acid, goal uric acid less than 6 mg/dL  - CBC Auto Differential; Future  - Comprehensive Metabolic Panel; Future  - Uric Acid; Future       The patient indicates understanding of these issues and agrees with the plan. Return in about 8 weeks (around 11/20/2018). The risks and benefits of my recommendations, as well as other treatment options, benefits and side effects were discussed with the patient. All questions were answered. ######################################################################    I thank you for giving me the opportunity to participate in [facility] care. If you have any questions or concerns please feel free to contact me. I look forward to following  Jose Marlow along with you. Electronically signed by: Cody Wong MD, 9/25/2018 11:47 AM    Documentation was done using voice recognition dragon software. Every effort was made to ensure accuracy; however, inadvertent unintentional computerized transcription errors may be present.

## 2018-09-26 ENCOUNTER — TELEPHONE (OUTPATIENT)
Dept: INTERNAL MEDICINE CLINIC | Age: 83
End: 2018-09-26

## 2018-09-26 ENCOUNTER — TELEPHONE (OUTPATIENT)
Dept: FAMILY MEDICINE CLINIC | Age: 83
End: 2018-09-26

## 2018-09-26 DIAGNOSIS — E87.6 HYPOKALEMIA: Primary | ICD-10-CM

## 2018-09-26 RX ORDER — POTASSIUM CHLORIDE 20 MEQ/1
20 TABLET, EXTENDED RELEASE ORAL DAILY
Qty: 30 TABLET | Refills: 0 | Status: SHIPPED | OUTPATIENT
Start: 2018-09-26 | End: 2018-11-06

## 2018-09-26 NOTE — TELEPHONE ENCOUNTER
Please inform pt of low potassium on labs. I've sent in potassium supplement - please take for 5 days and then repeat blood test in 1 week. We can use those results to decide if she will need more chronically. Can also eat potassium rich foods (beans, bananas, leafy greens).  Thanks

## 2018-09-26 NOTE — TELEPHONE ENCOUNTER
----- Message from Arvind Lopez MD sent at 9/26/2018  9:22 AM EDT -----  Please call the patient and let her know that her potassium is 2.9. I will forward labs to her PCP to address.

## 2018-10-04 DIAGNOSIS — E87.6 HYPOKALEMIA: ICD-10-CM

## 2018-10-04 LAB — POTASSIUM SERPL-SCNC: 3.6 MMOL/L (ref 3.5–5.1)

## 2018-10-22 ENCOUNTER — CARE COORDINATION (OUTPATIENT)
Dept: CARE COORDINATION | Age: 83
End: 2018-10-22

## 2018-11-07 ENCOUNTER — CARE COORDINATION (OUTPATIENT)
Dept: CARE COORDINATION | Age: 83
End: 2018-11-07

## 2018-11-07 NOTE — CARE COORDINATION
Take 1 tablet by mouth daily 8/23/18   Nick Padilla MD   predniSONE (DELTASONE) 5 MG tablet Take 4  tabs daily for 2 days, then 3 tabs daily for 2 days, then 2 tabs daily for 2 days, then 1 tab daily for 2 days and then stop 8/23/18   Nick Padilla MD   pantoprazole (PROTONIX) 40 MG tablet Take 1 tablet by mouth daily 7/16/18   Altagracia Bruno MD   [facility] Devices [facility]) MISC Use 1 as directed for assistance with mobility 5/2/18   Aster Norwood MD   [facility]  Devices [facility]) MISC 1 each by Does not apply route daily 4/25/18   Aliza Ruggiero MD   Cyanocobalamin 1000 MCG/ML KIT Inject 1,000 mcg weekly for 4 weeks, then monthly afterward 4/4/18   Aliza Ruggiero MD   Folic Acid 5 MG CAPS Take 5 mg by mouth daily 4/4/18   Aliza Ruggiero MD       Future Appointments  Date Time Provider Sandeep Fox   11/28/2018 11:40 AM Nick Padilla MD [facility]

## 2018-11-08 ENCOUNTER — CARE COORDINATION (OUTPATIENT)
Dept: CARE COORDINATION | Age: 83
End: 2018-11-08

## 2018-11-12 ENCOUNTER — HOSPITAL ENCOUNTER (OUTPATIENT)
Dept: GENERAL RADIOLOGY | Age: 83
Discharge: HOME | End: 2018-11-12
Payer: MEDICARE

## 2018-11-12 DIAGNOSIS — R13.12 DYSPHAGIA, OROPHARYNGEAL: ICD-10-CM

## 2018-11-12 PROCEDURE — 92611 MOTION FLUOROSCOPY/SWALLOW: CPT

## 2018-11-12 PROCEDURE — G8998 SWALLOW D/C STATUS: HCPCS

## 2018-11-12 PROCEDURE — G8996 SWALLOW CURRENT STATUS: HCPCS

## 2018-11-12 PROCEDURE — G8997 SWALLOW GOAL STATUS: HCPCS

## 2018-11-12 PROCEDURE — 74230 X-RAY XM SWLNG FUNCJ C+: CPT

## 2018-11-12 PROCEDURE — 92526 ORAL FUNCTION THERAPY: CPT

## 2018-11-12 NOTE — PROCEDURES
INSTRUMENTAL SWALLOW REPORT  MODIFIED BARIUM SWALLOW    NAME: Margret Her   : 1929  MRN: [id number]       Date of Eval: 2018     Ordering Physician: Dr. Blake Kellogg  Radiologist: Dr. Gely Lacey     Referring Diagnosis(es): Referring Diagnosis: Oropharyngeal Dysphagia     Past Medical History:  has a past medical history of Cataract; Chronic midline low back pain without sciatica; Essential hypertension; Gouty arthropathy; History of breast cancer; Hypercholesterolemia; Lumbar spondylosis; Osteoporosis of multiple sites; Primary osteoarthritis involving multiple joints; Spinal stenosis, lumbar; and Type 2 diabetes mellitus without complication, without long-term current use of insulin ([facility] [address] Past Surgical History:  has a past surgical history that includes Mastectomy; Cataract removal (2012); Cataract removal (2012); and Cholecystectomy. Recent CXR 18-  Impression   Left basilar opacity, atelectasis favored over pneumonia.  Possible small   left pleural effusion. Patient Complaints/Reason for Referral:  Margret Hre was referred for a MBS to assess the efficiency of his/her swallow function, assess for aspiration, and to make recommendations regarding safe dietary consistencies, effective compensatory strategies, and safe eating environment. · Patient complaints: Pt's daughter reports coughing on thin liquids (occ), coughing on pills with water, and occ trouble with solids. · Pt's daughter reports hx of esophageal stretching. Last time pt was stretched was Aug 2018 (per daughter). · MBSS completed 12/15/16 with mild oropharyngeal dysphagia with no penetration or aspiration. Pt was recommended regular solids/thin liquids. Onset of problem:   Date of Onset: No specific onset date provided     CURRENT DIET CONSISTENCIES:  Current Diet Solid Consistency: Regular  Current Diet Liquid Consistency:  Thin    Behavior/Cognition/Vision/Hearing:  Behavior/Cognition: Alert; Cooperative;Pleasant mood  Vision: Within Functional Limits  Hearing: Within functional limits      Treatment Dx and ICD 10: oropharyngeal dysphagia     Patient Position: Lateral   Patient Degrees: Upright 90 [facility]   Consistencies Administered: Soft solid;Reg solid;Puree; Honey cup;Nectar cup; Thin straw; Thin cup  Compensatory Swallowing Strategies Attempted: Upright as possible for all oral intake;Small bites/sips       Impressions:  Mild pharyngeal dysphagia   · Deficits re: reduced bolus control and cohesion with premature spillage to valleculae piror to swallow. Delayed swallow initiation. Reduced laryngeal elevation, anterior movement, and strength. Minimal-trace post swallow valleculae residue. · Pt with shallow flash penetration on NTL x1. Pt able to clear completely with completion of swallow. · Pt with initially shallow transient penetration on thin liquids via cup/straw. Pt able to clear completely. However, pt was noted to have slightly deeper penetration with thin liquids as assessment progressed (? Suspect possibly 2/2 fatigue); but pt was able to clear penetration with completion of swallow. · No aspiration. REC continue on regular solids (dental soft/smaller bites) and thin liquids. Daughter educated re: meds in puree consistencies to reduce coughing with pills. No further SLP tx indicated. Dysphagia Outcome Severity Scale: Level 5: Mild dysphagia- Distant supervision. May need one diet consistency restricted  Penetration-Aspiration Scale (PAS): 2 - Material enters the airway, remains above the vocal folds, and is ejected from the airway    Recommended Diet:  Solid consistency: Dental Soft;Regular  Liquid consistency: Thin  Liquid administration via: Cup  Medication administration: Meds in puree    Safe Swallow Protocol:  Supervision: Distant  Compensatory Swallowing Strategies: Eat/Feed slowly; Small bites/sips;Upright as possible for all oral intake    Recommendations/Treatment  Requires SLP Intervention: No  D/C Recommendations: Home independently  Postural Changes and/or Swallow Maneuvers: Upright    Education: Images and recommendations were reviewed with pt and pt's daughter following this exam.   Patient Education: Eval results and recs   Patient Education Response: Needs reinforcement; No evidence of learning    Prognosis for safe diet advancement: fair  Barriers to reach goals: age;time post onset    Oral Preparation / Oral Phase  Oral Phase: WFL    Pharyngeal Phase  Pharyngeal Phase: Impaired  Pharyngeal Phase - Major Contributing Deficits  Delayed Swallow Initiation: All  Premature Spillage to Valleculae: All  Reduced Laryngeal Elevation: All  Reduced Anterior Laryngeal Movement: All  Shallow Penetration Before: Thin cup; Thin straw;Nectar cup  Complete Self-clearing (shallow): All  Deep Penetration During: Thin straw; Thin cup  Complete Retrieval (deep): Thin straw; Thin cup  Pharyngeal Residue - Valleculae: All    Esophageal Phase  Esophageal Screen: WFL    Pain   Patient Currently in Pain: Denies    G-Code:  SLP G-Codes  Functional Assessment Tool Used: NOMS- MBSS  Functional Limitations: Swallowing  Swallow Current Status (): At least 1 percent but less than 20 percent impaired, limited or restricted  Swallow Goal Status (): At least 1 percent but less than 20 percent impaired, limited or restricted  Swallow Discharge Status ():  At least 1 percent but less than 20 percent impaired, limited or restricted      Therapy Time:   Individual Concurrent Group Co-treatment   Time In 1300         Time Out 1400         Minutes Beka Patel CCC/SLP  #[id number]  Speech Language Pathologist   11/12/2018, 1:58 PM

## 2018-11-14 ENCOUNTER — CARE COORDINATION (OUTPATIENT)
Dept: CARE COORDINATION | Age: 83
End: 2018-11-14

## 2018-11-14 NOTE — CARE COORDINATION
Ambulatory Care Coordination Note  11/14/2018  CM Risk Score: 6  Henri Mortality Risk Score: 29.64    ACC: Marcelino Calix, RN    Summary Note:     PLAN:  Will send the following:  Learning About the Diet for Swallowing Problems (EPIC)  Learning About Pureeing Foods  Encouraged caregiver to f/u with [employer]'s organization      FYI:  Reviewed plan of care following swallow evaluation  Reviewed s/s of aspiration  Caregiver verbalizing understanding regarding recommendations: patient no longer to use straw with thin liquids. Medications to be given in puree consistency foods; she is able to provide examples                                                                                                             Eat/feed slow, small bites and to always eat in upright position  Caregiver has not f/u with the [employer]'s organization; she reports she has family for additional support  Patient is scheduled for scope with in January      Care Coordination Interventions    Program Enrollment:  Rising Risk  Referral from Primary Care Provider:  No  Suggested Interventions and Community Resources  Home Care Waiver:  Declined (Comment: patient has been evaluated for [employer]  services in the past and they decline)  [address]:  Completed  Pharmacist:  Not Started (Comment: Patient is dependent on caregiver; this highlighted automatically and referral not needed)  Physical Therapy:  Completed  Senior Services:  Declined (Comment: Recommended [facility])  Other Services: In Process (Comment: [employer])  [employer] Management Tools:   In Process  Other Services or Interventions:  swallow eval-to be done by [id number] [place] [address] Resource Goal   No change (11/14/2018)             I will contact the Alzheimer's

## 2018-11-29 ENCOUNTER — OFFICE VISIT (OUTPATIENT)
Dept: RHEUMATOLOGY | Age: 83
End: 2018-11-29
Payer: MEDICARE

## 2018-11-29 VITALS — TEMPERATURE: 97.4 F

## 2018-11-29 DIAGNOSIS — M1A.09X1 IDIOPATHIC CHRONIC GOUT OF MULTIPLE SITES WITH TOPHUS: Primary | ICD-10-CM

## 2018-11-29 DIAGNOSIS — M1A.09X1 IDIOPATHIC CHRONIC GOUT OF MULTIPLE SITES WITH TOPHUS: ICD-10-CM

## 2018-11-29 LAB — URIC ACID, SERUM: 3.7 MG/DL (ref 2.6–6)

## 2018-11-29 PROCEDURE — G8427 DOCREV CUR MEDS BY ELIG CLIN: HCPCS | Performed by: INTERNAL MEDICINE

## 2018-11-29 PROCEDURE — 1101F PT FALLS ASSESS-DOCD LE1/YR: CPT | Performed by: INTERNAL MEDICINE

## 2018-11-29 PROCEDURE — G8419 CALC BMI OUT NRM PARAM NOF/U: HCPCS | Performed by: INTERNAL MEDICINE

## 2018-11-29 PROCEDURE — 1036F TOBACCO NON-USER: CPT | Performed by: INTERNAL MEDICINE

## 2018-11-29 PROCEDURE — 1123F ACP DISCUSS/DSCN MKR DOCD: CPT | Performed by: INTERNAL MEDICINE

## 2018-11-29 PROCEDURE — 4040F PNEUMOC VAC/ADMIN/RCVD: CPT | Performed by: INTERNAL MEDICINE

## 2018-11-29 PROCEDURE — 1090F PRES/ABSN URINE INCON ASSESS: CPT | Performed by: INTERNAL MEDICINE

## 2018-11-29 PROCEDURE — G8484 FLU IMMUNIZE NO ADMIN: HCPCS | Performed by: INTERNAL MEDICINE

## 2018-11-29 PROCEDURE — 99213 OFFICE O/P EST LOW 20 MIN: CPT | Performed by: INTERNAL MEDICINE

## 2018-11-29 NOTE — PROGRESS NOTES
2018  Patient Name: Margret Her  : 1929  Medical Record: [id number]    MEDICATIONS  Current Outpatient Prescriptions   Medication Sig Dispense Refill    aspirin 81 MG tablet Take 81 mg by mouth daily      escitalopram (LEXAPRO) 10 MG tablet TAKE ONE TABLET BY MOUTH DAILY 30 tablet 5    allopurinol (ZYLOPRIM) 100 MG tablet Take 0.5 tab daily for 4 weeks and then increase to 1 tab daily 30 tablet 3    colchicine (COLCRYS) 0.6 MG tablet Take 1 tablet by mouth daily 30 tablet 3    pantoprazole (PROTONIX) 40 MG tablet Take 1 tablet by mouth daily 30 tablet 3    Misc. Devices [facility]) MISC Use 1 as directed for assistance with mobility 1 each 0    Misc. Devices [facility]) MISC 1 each by Does not apply route daily 1 each 0    Cyanocobalamin 1000 MCG/ML KIT Inject 1,000 mcg weekly for 4 weeks, then monthly afterward 4 kit 3    Folic Acid 5 MG CAPS Take 5 mg by mouth daily 30 capsule 5    predniSONE (DELTASONE) 5 MG tablet Take 4  tabs daily for 2 days, then 3 tabs daily for 2 days, then 2 tabs daily for 2 days, then 1 tab daily for 2 days and then stop 20 tablet 1     No current facility-administered medications for this visit. ALLERGIES  No Known Allergies      Comments  No specialty comments available. Background history:  Margret Her is a 80 y.o. female who is being seen for follow up evaluation of Gout. Patient has dementia. She is accompanied by her daughter. Most of the history is obtained from the daughter. She presented with first attack of gout in May 2018 involving left first toe associated with pain, swelling, redness and tenderness and oozing of chalky material.  She was prescribed steroids with improvement. She then had an involvement of right third DIP joint in 2018. She started wheezing about chalky white material from the right third DIP joint. She was given colchicine for 2 days without improvement.   She was given another course of steroids which

## 2018-12-05 ENCOUNTER — ANESTHESIA EVENT (OUTPATIENT)
Dept: ENDOSCOPY | Age: 83
End: 2018-12-05
Payer: MEDICARE

## 2018-12-06 ENCOUNTER — ANESTHESIA (OUTPATIENT)
Dept: ENDOSCOPY | Age: 83
End: 2018-12-06
Payer: MEDICARE

## 2018-12-06 ENCOUNTER — HOSPITAL ENCOUNTER (OUTPATIENT)
Age: 83
Setting detail: OUTPATIENT SURGERY
Discharge: HOME | End: 2018-12-06
Attending: INTERNAL MEDICINE | Admitting: INTERNAL MEDICINE
Payer: MEDICARE

## 2018-12-06 VITALS
RESPIRATION RATE: 14 BRPM | HEIGHT: 58 IN | BODY MASS INDEX: 26.45 KG/M2 | DIASTOLIC BLOOD PRESSURE: 71 MMHG | WEIGHT: 126 LBS | TEMPERATURE: 97.2 F | HEART RATE: 91 BPM | SYSTOLIC BLOOD PRESSURE: 142 MMHG | OXYGEN SATURATION: 98 %

## 2018-12-06 VITALS
SYSTOLIC BLOOD PRESSURE: 179 MMHG | OXYGEN SATURATION: 100 % | RESPIRATION RATE: 10 BRPM | DIASTOLIC BLOOD PRESSURE: 73 MMHG

## 2018-12-06 LAB
ANION GAP SERPL CALCULATED.3IONS-SCNC: 12 MMOL/L (ref 3–16)
BUN BLDV-MCNC: 11 MG/DL (ref 7–20)
CALCIUM SERPL-MCNC: 9.5 MG/DL (ref 8.3–10.6)
CHLORIDE BLD-SCNC: 102 MMOL/L (ref 99–110)
CO2: 26 MMOL/L (ref 21–32)
CREAT SERPL-MCNC: 0.9 MG/DL (ref 0.6–1.2)
GFR AFRICAN AMERICAN: >60
GFR NON-AFRICAN AMERICAN: 59
GLUCOSE BLD-MCNC: 103 MG/DL (ref 70–99)
GLUCOSE BLD-MCNC: 111 MG/DL (ref 70–99)
PERFORMED ON: ABNORMAL
POTASSIUM REFLEX MAGNESIUM: 3.8 MMOL/L (ref 3.5–5.1)
SODIUM BLD-SCNC: 140 MMOL/L (ref 136–145)

## 2018-12-06 PROCEDURE — 93010 ELECTROCARDIOGRAM REPORT: CPT | Performed by: INTERNAL MEDICINE

## 2018-12-06 PROCEDURE — 7100000000 HC PACU RECOVERY - FIRST 15 MIN: Performed by: INTERNAL MEDICINE

## 2018-12-06 PROCEDURE — 2709999900 HC NON-CHARGEABLE SUPPLY: Performed by: INTERNAL MEDICINE

## 2018-12-06 PROCEDURE — 3700000001 HC ADD 15 MINUTES (ANESTHESIA): Performed by: INTERNAL MEDICINE

## 2018-12-06 PROCEDURE — 7100000010 HC PHASE II RECOVERY - FIRST 15 MIN: Performed by: INTERNAL MEDICINE

## 2018-12-06 PROCEDURE — 93005 ELECTROCARDIOGRAM TRACING: CPT | Performed by: ANESTHESIOLOGY

## 2018-12-06 PROCEDURE — 2500000003 HC RX 250 WO HCPCS

## 2018-12-06 PROCEDURE — 3609017100 HC EGD: Performed by: INTERNAL MEDICINE

## 2018-12-06 PROCEDURE — 36415 COLL VENOUS BLD VENIPUNCTURE: CPT

## 2018-12-06 PROCEDURE — 6360000002 HC RX W HCPCS

## 2018-12-06 PROCEDURE — 7100000001 HC PACU RECOVERY - ADDTL 15 MIN: Performed by: INTERNAL MEDICINE

## 2018-12-06 PROCEDURE — 2580000003 HC RX 258: Performed by: ANESTHESIOLOGY

## 2018-12-06 PROCEDURE — 7100000011 HC PHASE II RECOVERY - ADDTL 15 MIN: Performed by: INTERNAL MEDICINE

## 2018-12-06 PROCEDURE — 80048 BASIC METABOLIC PNL TOTAL CA: CPT

## 2018-12-06 PROCEDURE — 3609015300 HC ESOPHAGEAL DILATION MALONEY: Performed by: INTERNAL MEDICINE

## 2018-12-06 PROCEDURE — 3700000000 HC ANESTHESIA ATTENDED CARE: Performed by: INTERNAL MEDICINE

## 2018-12-06 RX ORDER — MEPERIDINE HYDROCHLORIDE 25 MG/ML
12.5 INJECTION INTRAMUSCULAR; INTRAVENOUS; SUBCUTANEOUS EVERY 5 MIN PRN
Status: DISCONTINUED | OUTPATIENT
Start: 2018-12-06 | End: 2018-12-06 | Stop reason: HOSPADM

## 2018-12-06 RX ORDER — ONDANSETRON 2 MG/ML
4 INJECTION INTRAMUSCULAR; INTRAVENOUS
Status: DISCONTINUED | OUTPATIENT
Start: 2018-12-06 | End: 2018-12-06 | Stop reason: HOSPADM

## 2018-12-06 RX ORDER — FENTANYL CITRATE 50 UG/ML
25 INJECTION, SOLUTION INTRAMUSCULAR; INTRAVENOUS EVERY 5 MIN PRN
Status: DISCONTINUED | OUTPATIENT
Start: 2018-12-06 | End: 2018-12-06 | Stop reason: HOSPADM

## 2018-12-06 RX ORDER — OXYCODONE HYDROCHLORIDE AND ACETAMINOPHEN 5; 325 MG/1; MG/1
1 TABLET ORAL PRN
Status: DISCONTINUED | OUTPATIENT
Start: 2018-12-06 | End: 2018-12-06 | Stop reason: HOSPADM

## 2018-12-06 RX ORDER — FENTANYL CITRATE 50 UG/ML
50 INJECTION, SOLUTION INTRAMUSCULAR; INTRAVENOUS EVERY 5 MIN PRN
Status: DISCONTINUED | OUTPATIENT
Start: 2018-12-06 | End: 2018-12-06 | Stop reason: HOSPADM

## 2018-12-06 RX ORDER — SODIUM CHLORIDE 0.9 % (FLUSH) 0.9 %
10 SYRINGE (ML) INJECTION PRN
Status: DISCONTINUED | OUTPATIENT
Start: 2018-12-06 | End: 2018-12-06 | Stop reason: HOSPADM

## 2018-12-06 RX ORDER — PANTOPRAZOLE SODIUM 40 MG/1
40 TABLET, DELAYED RELEASE ORAL DAILY
Qty: 30 TABLET | Refills: 6 | Status: ON HOLD | OUTPATIENT
Start: 2018-12-06 | End: 2019-04-26 | Stop reason: SDUPTHER

## 2018-12-06 RX ORDER — MORPHINE SULFATE 2 MG/ML
2 INJECTION, SOLUTION INTRAMUSCULAR; INTRAVENOUS EVERY 5 MIN PRN
Status: DISCONTINUED | OUTPATIENT
Start: 2018-12-06 | End: 2018-12-06 | Stop reason: HOSPADM

## 2018-12-06 RX ORDER — SODIUM CHLORIDE 9 MG/ML
INJECTION, SOLUTION INTRAVENOUS CONTINUOUS
Status: DISCONTINUED | OUTPATIENT
Start: 2018-12-06 | End: 2018-12-06 | Stop reason: HOSPADM

## 2018-12-06 RX ORDER — OXYCODONE HYDROCHLORIDE AND ACETAMINOPHEN 5; 325 MG/1; MG/1
2 TABLET ORAL PRN
Status: DISCONTINUED | OUTPATIENT
Start: 2018-12-06 | End: 2018-12-06 | Stop reason: HOSPADM

## 2018-12-06 RX ORDER — SODIUM CHLORIDE 0.9 % (FLUSH) 0.9 %
10 SYRINGE (ML) INJECTION EVERY 12 HOURS SCHEDULED
Status: DISCONTINUED | OUTPATIENT
Start: 2018-12-06 | End: 2018-12-06 | Stop reason: HOSPADM

## 2018-12-06 RX ORDER — PROPOFOL 10 MG/ML
INJECTION, EMULSION INTRAVENOUS PRN
Status: DISCONTINUED | OUTPATIENT
Start: 2018-12-06 | End: 2018-12-06 | Stop reason: SDUPTHER

## 2018-12-06 RX ORDER — LIDOCAINE HYDROCHLORIDE 20 MG/ML
INJECTION, SOLUTION INFILTRATION; PERINEURAL PRN
Status: DISCONTINUED | OUTPATIENT
Start: 2018-12-06 | End: 2018-12-06 | Stop reason: SDUPTHER

## 2018-12-06 RX ORDER — MORPHINE SULFATE 2 MG/ML
1 INJECTION, SOLUTION INTRAMUSCULAR; INTRAVENOUS EVERY 5 MIN PRN
Status: DISCONTINUED | OUTPATIENT
Start: 2018-12-06 | End: 2018-12-06 | Stop reason: HOSPADM

## 2018-12-06 RX ADMIN — PROPOFOL 10 MG: 10 INJECTION, EMULSION INTRAVENOUS at 10:18

## 2018-12-06 RX ADMIN — SODIUM CHLORIDE: 9 INJECTION, SOLUTION INTRAVENOUS at 09:49

## 2018-12-06 RX ADMIN — PROPOFOL 10 MG: 10 INJECTION, EMULSION INTRAVENOUS at 10:16

## 2018-12-06 RX ADMIN — LIDOCAINE HYDROCHLORIDE 60 MG: 20 INJECTION, SOLUTION INFILTRATION; PERINEURAL at 10:10

## 2018-12-06 RX ADMIN — PROPOFOL 30 MG: 10 INJECTION, EMULSION INTRAVENOUS at 10:10

## 2018-12-06 RX ADMIN — PROPOFOL 20 MG: 10 INJECTION, EMULSION INTRAVENOUS at 10:14

## 2018-12-06 ASSESSMENT — PULMONARY FUNCTION TESTS
PIF_VALUE: 0
PIF_VALUE: 1
PIF_VALUE: 0

## 2018-12-06 ASSESSMENT — PAIN SCALES - GENERAL
PAINLEVEL_OUTOF10: 0

## 2018-12-06 ASSESSMENT — LIFESTYLE VARIABLES: SMOKING_STATUS: 0

## 2018-12-06 ASSESSMENT — PAIN - FUNCTIONAL ASSESSMENT: PAIN_FUNCTIONAL_ASSESSMENT: 0-10

## 2018-12-06 NOTE — PROGRESS NOTES
Awake. Vss. ivf kvo. Abd soft and round. No c/o pain. Mild confusion.   Electronically signed by Magalys Gibbs RN on 12/6/2018 at 10:43 AM

## 2018-12-06 NOTE — OP NOTE
[address] [place] [address]  Endoscopy Note    Patient: Irvin Chavez  : 1929  Acct#:     Procedure: Esophagogastroduodenoscopy  Dorthea Knott dilation    Date:  2018     Surgeon:  Rhea Cheadle, MD    Referring Physician:  Dr. Keri Pulido    Indications: This is a 80y.o. year old female who presents today with tight lower esophageal stricture with food impaction 2018 and balloon dilation to 12mm. Ongoing dysphagia. Planning repeat EGD with dilation. Anesthesia:  TIVA    Description of Procedure:  Informed consent was obtained from the patient after explanation of indications, benefits and possible risks and complications of the procedure. The patient was then taken to the endoscopy suite, placed in the left lateral decubitus position and the above IV sedation was administrered. The Olympus videoendoscope was placed in the patient's mouth and under direct visualization passed into the esophagus and advanced without difficulty to the 2nd portion of the duodenum. Views were good, patient toleration was good. Retroflexion was performed in the stomach. Findings:  1. The esophagus showed a narrowed appearance throughout. There was a tight stricture in the distal esophagus at the GE junction. Traversed with the scope. Distal esophagus otherwise appeared normal without evidence of Lizarraga's esophagus or reflux esophagitis. 2.  There was a 6cm sliding hiatal hernia without Sidney's ulcers. 3.  Normal stomach and duodenum. Next, toro dilation was performed starting at 30 Fr and increasing by 2 cm up to 40 Fr. There was moderate resistance at 40 Fr. Post dilation views showed mucosal tearing at the GE junction after dilation indicative of an effective dilation. The scope was then withdrawn back into the stomach, it was decompressed, and the scope was completely withdrawn.     The patient tolerated the procedure well and was taken to the post anesthesia care unit in good

## 2018-12-07 LAB
EKG ATRIAL RATE: 77 BPM
EKG DIAGNOSIS: NORMAL
EKG Q-T INTERVAL: 424 MS
EKG QRS DURATION: 88 MS
EKG QTC CALCULATION (BAZETT): 486 MS
EKG R AXIS: -21 DEGREES
EKG T AXIS: 27 DEGREES
EKG VENTRICULAR RATE: 79 BPM

## 2018-12-11 ENCOUNTER — ANESTHESIA (OUTPATIENT)
Dept: ENDOSCOPY | Age: 83
End: 2018-12-11
Payer: MEDICARE

## 2018-12-11 ENCOUNTER — ANESTHESIA EVENT (OUTPATIENT)
Dept: ENDOSCOPY | Age: 83
End: 2018-12-11
Payer: MEDICARE

## 2018-12-12 NOTE — PROGRESS NOTES
[facility] [facility] time____330________        Surgery time____________    Take the following medications with a sip of water: protonix    Do not eat or drink anything after 12:00 midnight prior to your surgery. This includes water chewing gum, mints and ice chips. You may brush your teeth and gargle the morning of your surgery, but do not swallow the water     Please see your family doctor/pediatrician for a history and physical and/or concerning medications. Bring any test results/reports from your physicians office. If you are under the care of a heart doctor or specialist doctor, please be aware that you may be asked to them for clearance    You may be asked to stop blood thinners such as Coumadin, Plavix, Fragmin, Lovenox, etc., or any anti-inflammatories such as:  Aspirin, Ibuprofen, Advil, Naproxen prior to your surgery. We also ask that you stop any OTC medications such as fish oil, vitamin E, glucosamine, garlic, Multivitamins, COQ 10, etc.    We ask that you do not smoke 24 hours prior to surgery  We ask that you do not  drink any alcoholic beverages 24 hours prior to surgery     You must make arrangements for a responsible adult to take you home after your surgery. For your safety you will not be allowed to leave alone or drive yourself home. Your surgery will be cancelled if you do not have a ride home. Also for your safety, it is strongly suggested that someone stay with you the first 24 hours after your surgery. A parent or legal guardian must accompany a child scheduled for surgery and plan to stay at the hospital until the child is discharged. Please do not bring other children with you. For your comfort, please wear simple loose fitting clothing to the hospital.  Please do not bring valuables.     Do not wear any make-up or nail polish on your fingers or toes      For your safety, please do not wear any jewelry or body piercing's on

## 2018-12-12 NOTE — PROGRESS NOTES
PT DAUGHTER STATES SHE WAS TOLD HER MOM COULD DRINK BLACK COFFEE AND CLEAR LIQUIDS UP UNTIL 930AM OF PT PROCEDURE/DOS  I SPOKE W ANESTHESIA AND THEY DID NOT ADVISE THIS DUE TO HER AGE AND GI ISSUES.   DAUGHTER STATES SHE UNDERSTANDS AND WILL HOLD HER MOM OFF ALL LIQUIDS DOS

## 2018-12-13 ENCOUNTER — HOSPITAL ENCOUNTER (OUTPATIENT)
Age: 83
Setting detail: OUTPATIENT SURGERY
Discharge: HOME | End: 2018-12-13
Attending: INTERNAL MEDICINE | Admitting: INTERNAL MEDICINE
Payer: MEDICARE

## 2018-12-13 VITALS
BODY MASS INDEX: 26.42 KG/M2 | WEIGHT: 125.88 LBS | DIASTOLIC BLOOD PRESSURE: 98 MMHG | OXYGEN SATURATION: 94 % | TEMPERATURE: 97.8 F | HEART RATE: 85 BPM | RESPIRATION RATE: 18 BRPM | HEIGHT: 58 IN | SYSTOLIC BLOOD PRESSURE: 168 MMHG

## 2018-12-13 VITALS
RESPIRATION RATE: 17 BRPM | DIASTOLIC BLOOD PRESSURE: 87 MMHG | SYSTOLIC BLOOD PRESSURE: 141 MMHG | OXYGEN SATURATION: 100 %

## 2018-12-13 LAB
GLUCOSE BLD-MCNC: 79 MG/DL (ref 70–99)
PERFORMED ON: NORMAL

## 2018-12-13 PROCEDURE — 3700000000 HC ANESTHESIA ATTENDED CARE: Performed by: INTERNAL MEDICINE

## 2018-12-13 PROCEDURE — 2580000003 HC RX 258: Performed by: ANESTHESIOLOGY

## 2018-12-13 PROCEDURE — 7100000010 HC PHASE II RECOVERY - FIRST 15 MIN: Performed by: INTERNAL MEDICINE

## 2018-12-13 PROCEDURE — 3609012800 HC EGD DIAGNOSTIC ONLY: Performed by: INTERNAL MEDICINE

## 2018-12-13 PROCEDURE — 2580000003 HC RX 258: Performed by: NURSE ANESTHETIST, CERTIFIED REGISTERED

## 2018-12-13 PROCEDURE — 6360000002 HC RX W HCPCS: Performed by: NURSE ANESTHETIST, CERTIFIED REGISTERED

## 2018-12-13 PROCEDURE — 7100000011 HC PHASE II RECOVERY - ADDTL 15 MIN: Performed by: INTERNAL MEDICINE

## 2018-12-13 PROCEDURE — 2500000003 HC RX 250 WO HCPCS: Performed by: ANESTHESIOLOGY

## 2018-12-13 PROCEDURE — 7100000000 HC PACU RECOVERY - FIRST 15 MIN: Performed by: INTERNAL MEDICINE

## 2018-12-13 PROCEDURE — 7100000001 HC PACU RECOVERY - ADDTL 15 MIN: Performed by: INTERNAL MEDICINE

## 2018-12-13 PROCEDURE — 2709999900 HC NON-CHARGEABLE SUPPLY: Performed by: INTERNAL MEDICINE

## 2018-12-13 PROCEDURE — 3700000001 HC ADD 15 MINUTES (ANESTHESIA): Performed by: INTERNAL MEDICINE

## 2018-12-13 PROCEDURE — 3609015300 HC ESOPHAGEAL DILATION MALONEY: Performed by: INTERNAL MEDICINE

## 2018-12-13 PROCEDURE — 2500000003 HC RX 250 WO HCPCS: Performed by: NURSE ANESTHETIST, CERTIFIED REGISTERED

## 2018-12-13 PROCEDURE — S0028 INJECTION, FAMOTIDINE, 20 MG: HCPCS | Performed by: ANESTHESIOLOGY

## 2018-12-13 RX ORDER — FENTANYL CITRATE 50 UG/ML
50 INJECTION, SOLUTION INTRAMUSCULAR; INTRAVENOUS EVERY 5 MIN PRN
Status: DISCONTINUED | OUTPATIENT
Start: 2018-12-13 | End: 2018-12-13 | Stop reason: HOSPADM

## 2018-12-13 RX ORDER — MEPERIDINE HYDROCHLORIDE 25 MG/ML
12.5 INJECTION INTRAMUSCULAR; INTRAVENOUS; SUBCUTANEOUS EVERY 5 MIN PRN
Status: DISCONTINUED | OUTPATIENT
Start: 2018-12-13 | End: 2018-12-13 | Stop reason: HOSPADM

## 2018-12-13 RX ORDER — MORPHINE SULFATE 2 MG/ML
1 INJECTION, SOLUTION INTRAMUSCULAR; INTRAVENOUS EVERY 5 MIN PRN
Status: DISCONTINUED | OUTPATIENT
Start: 2018-12-13 | End: 2018-12-13 | Stop reason: HOSPADM

## 2018-12-13 RX ORDER — SODIUM CHLORIDE 0.9 % (FLUSH) 0.9 %
10 SYRINGE (ML) INJECTION PRN
Status: DISCONTINUED | OUTPATIENT
Start: 2018-12-13 | End: 2018-12-13 | Stop reason: HOSPADM

## 2018-12-13 RX ORDER — PROPOFOL 10 MG/ML
INJECTION, EMULSION INTRAVENOUS CONTINUOUS PRN
Status: DISCONTINUED | OUTPATIENT
Start: 2018-12-13 | End: 2018-12-13 | Stop reason: SDUPTHER

## 2018-12-13 RX ORDER — OXYCODONE HYDROCHLORIDE AND ACETAMINOPHEN 5; 325 MG/1; MG/1
2 TABLET ORAL PRN
Status: DISCONTINUED | OUTPATIENT
Start: 2018-12-13 | End: 2018-12-13 | Stop reason: HOSPADM

## 2018-12-13 RX ORDER — SODIUM CHLORIDE 9 MG/ML
INJECTION, SOLUTION INTRAVENOUS CONTINUOUS
Status: DISCONTINUED | OUTPATIENT
Start: 2018-12-13 | End: 2018-12-13 | Stop reason: HOSPADM

## 2018-12-13 RX ORDER — LIDOCAINE HYDROCHLORIDE 20 MG/ML
INJECTION, SOLUTION INFILTRATION; PERINEURAL PRN
Status: DISCONTINUED | OUTPATIENT
Start: 2018-12-13 | End: 2018-12-13 | Stop reason: SDUPTHER

## 2018-12-13 RX ORDER — PROPOFOL 10 MG/ML
INJECTION, EMULSION INTRAVENOUS PRN
Status: DISCONTINUED | OUTPATIENT
Start: 2018-12-13 | End: 2018-12-13 | Stop reason: SDUPTHER

## 2018-12-13 RX ORDER — SODIUM CHLORIDE 0.9 % (FLUSH) 0.9 %
10 SYRINGE (ML) INJECTION EVERY 12 HOURS SCHEDULED
Status: DISCONTINUED | OUTPATIENT
Start: 2018-12-13 | End: 2018-12-13 | Stop reason: HOSPADM

## 2018-12-13 RX ORDER — MORPHINE SULFATE 2 MG/ML
2 INJECTION, SOLUTION INTRAMUSCULAR; INTRAVENOUS EVERY 5 MIN PRN
Status: DISCONTINUED | OUTPATIENT
Start: 2018-12-13 | End: 2018-12-13 | Stop reason: HOSPADM

## 2018-12-13 RX ORDER — OXYCODONE HYDROCHLORIDE AND ACETAMINOPHEN 5; 325 MG/1; MG/1
1 TABLET ORAL PRN
Status: DISCONTINUED | OUTPATIENT
Start: 2018-12-13 | End: 2018-12-13 | Stop reason: HOSPADM

## 2018-12-13 RX ORDER — ONDANSETRON 2 MG/ML
4 INJECTION INTRAMUSCULAR; INTRAVENOUS
Status: DISCONTINUED | OUTPATIENT
Start: 2018-12-13 | End: 2018-12-13 | Stop reason: HOSPADM

## 2018-12-13 RX ORDER — FENTANYL CITRATE 50 UG/ML
25 INJECTION, SOLUTION INTRAMUSCULAR; INTRAVENOUS EVERY 5 MIN PRN
Status: DISCONTINUED | OUTPATIENT
Start: 2018-12-13 | End: 2018-12-13 | Stop reason: HOSPADM

## 2018-12-13 RX ORDER — SODIUM CHLORIDE 9 MG/ML
INJECTION, SOLUTION INTRAVENOUS CONTINUOUS PRN
Status: DISCONTINUED | OUTPATIENT
Start: 2018-12-13 | End: 2018-12-13 | Stop reason: SDUPTHER

## 2018-12-13 RX ADMIN — FAMOTIDINE 20 MG: 10 INJECTION, SOLUTION INTRAVENOUS at 15:49

## 2018-12-13 RX ADMIN — SODIUM CHLORIDE: 9 INJECTION, SOLUTION INTRAVENOUS at 17:00

## 2018-12-13 RX ADMIN — SODIUM CHLORIDE: 9 INJECTION, SOLUTION INTRAVENOUS at 15:49

## 2018-12-13 RX ADMIN — PROPOFOL 30 MG: 10 INJECTION, EMULSION INTRAVENOUS at 17:05

## 2018-12-13 RX ADMIN — LIDOCAINE HYDROCHLORIDE 4 ML: 20 INJECTION, SOLUTION INFILTRATION; PERINEURAL at 17:05

## 2018-12-13 RX ADMIN — PROPOFOL 75 MCG/KG/MIN: 10 INJECTION, EMULSION INTRAVENOUS at 17:05

## 2018-12-13 ASSESSMENT — PULMONARY FUNCTION TESTS
PIF_VALUE: 0

## 2018-12-13 ASSESSMENT — LIFESTYLE VARIABLES: SMOKING_STATUS: 0

## 2018-12-13 ASSESSMENT — PAIN SCALES - GENERAL: PAINLEVEL_OUTOF10: 0

## 2018-12-13 NOTE — ANESTHESIA PRE PROCEDURE
Department of Anesthesiology  Preprocedure Note       Name:  Cassie Velasquez   Age:  80 y.o.  :  1929                                          MRN:  [id number]         Date:  2018      Surgeon: Bob Hanley):  Reed Lopez MD    Procedure: EGD DILATION SAVORY (N/A )    Medications prior to admission:   Prior to Admission medications    Medication Sig Start Date End Date Taking? Authorizing Provider   pantoprazole (PROTONIX) 40 MG tablet Take 1 tablet by mouth daily 18  Yes Reed Lopez MD   aspirin 81 MG tablet Take 81 mg by mouth daily   Yes Historical Provider, MD   escitalopram (LEXAPRO) 10 MG tablet TAKE ONE TABLET BY MOUTH DAILY 10/18/18   [place] Fern Ruggiero MD   allopurinol (ZYLOPRIM) 100 MG tablet Take 0.5 tab daily for 4 weeks and then increase to 1 tab daily 18   Karey Garza MD   colchicine (COLCRYS) 0.6 MG tablet Take 1 tablet by mouth daily 18   Karey Garza MD   Cyanocobalamin 1000 MCG/ML KIT Inject 1,000 mcg weekly for 4 weeks, then monthly afterward 18   [place] Fern Ruggiero MD   Folic Acid 5 MG CAPS Take 5 mg by mouth daily 18   [place] Fern Ruggiero MD       Current medications:    No current facility-administered medications for this encounter.         Allergies:  No Known Allergies    Problem List:    Patient Active Problem List   Diagnosis Code    Primary osteoarthritis involving multiple joints M15.0    History of breast cancer Z85.3    Chronic midline low back pain without sciatica M54.5, G89.29    Essential hypertension I10    Cataract H26.9    Lumbar spondylosis M47.816    Spinal stenosis, lumbar M48.061    Type 2 diabetes mellitus without complication, without long-term current use of insulin ([facility]) E11.9    Gouty arthropathy M10.9    Hypercholesterolemia E78.00    CKD (chronic kidney disease) stage 3, GFR 30-59 ml/min ([facility]) N18.3    Folate deficiency E53.8    Vitamin B12 deficiency E53.8    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

## 2018-12-13 NOTE — H&P
Cyanocobalamin 1000 MCG/ML KIT Inject 1,000 mcg weekly for 4 weeks, then monthly afterward 4 kit 3    Folic Acid 5 MG CAPS Take 5 mg by mouth daily 30 capsule 5        Allergies:  Patient has no known allergies. Social History:   Social History     Social History    Marital status:      Spouse name: N/A    Number of children: 3    Years of education: N/A     Occupational History    Not on file. Social History Main Topics    Smoking status: Never Smoker    Smokeless tobacco: Never Used    Alcohol use No    Drug use: No    Sexual activity: Not on file     Other Topics Concern    Not on file     Social History Narrative    Grew up in [place]    3 daughters, lives with  (who is seen here) and her daughter Trevin Hamlin    7 grandkids, most in town      Family History:   History reviewed. No pertinent family history. PHYSICAL EXAM:      BP (!) 183/100   Pulse 82   Resp 14   Ht 4' 10\" (1.473 m)   Wt 125 lb 14.1 oz (57.1 kg)   SpO2 97%   BMI 26.31 kg/m²  I        Heart:  RRR    Lungs:  CTA b    Abdomen:  S/NT/ND/+BS      ASSESSMENT AND PLAN:  ASA: per anesthesia  Mallampati: per anesthesia  1. Patient is a 80 y.o. female here for EGD   2. Procedure options, risks and benefits reviewed with the patient. The patient expresses understanding.     Ellis Durbin

## 2018-12-13 NOTE — PROGRESS NOTES
1725--pt admitted to PACU from endo. Pt awake and talking. O2 on at 3l/nc. Monitors placed. IV patent right hand. Abd soft.

## 2018-12-13 NOTE — OP NOTE
dilation. 2.  4cm sliding hiatal hernia  3. Otherwise normal EGD. Recommendations:   1. Clear liquid diet, advance as tolerated. 2.  Call as needed for recurrent dysphagia. 3.  Continue pantoprazole. 4.  Chew food well.     Ruben Bhatt MD  [address] [facility]

## 2019-01-01 DIAGNOSIS — M1A.09X1 IDIOPATHIC CHRONIC GOUT OF MULTIPLE SITES WITH TOPHUS: ICD-10-CM

## 2019-01-02 RX ORDER — COLCHICINE 0.6 MG/1
TABLET, FILM COATED ORAL
Qty: 30 TABLET | Refills: 2 | Status: ON HOLD | OUTPATIENT
Start: 2019-01-02 | End: 2019-04-26 | Stop reason: HOSPADM

## 2019-01-02 RX ORDER — ALLOPURINOL 100 MG/1
TABLET ORAL
Qty: 30 TABLET | Refills: 2 | Status: ON HOLD | OUTPATIENT
Start: 2019-01-02 | End: 2019-04-26 | Stop reason: HOSPADM

## 2019-01-03 ENCOUNTER — TELEPHONE (OUTPATIENT)
Dept: FAMILY MEDICINE CLINIC | Age: 84
End: 2019-01-03

## 2019-01-16 ENCOUNTER — TELEPHONE (OUTPATIENT)
Dept: FAMILY MEDICINE CLINIC | Age: 84
End: 2019-01-16

## 2019-01-16 ENCOUNTER — CARE COORDINATION (OUTPATIENT)
Dept: CARE COORDINATION | Age: 84
End: 2019-01-16

## 2019-01-16 NOTE — TELEPHONE ENCOUNTER
Called and spoke with Yara Parents, reviewed that symptoms do not require ED eval and ok to see tomorrow.  Thanks

## 2019-01-17 ENCOUNTER — OFFICE VISIT (OUTPATIENT)
Dept: FAMILY MEDICINE CLINIC | Age: 84
End: 2019-01-17
Payer: MEDICARE

## 2019-01-17 VITALS
SYSTOLIC BLOOD PRESSURE: 130 MMHG | HEIGHT: 58 IN | RESPIRATION RATE: 97 BRPM | DIASTOLIC BLOOD PRESSURE: 80 MMHG | BODY MASS INDEX: 26.31 KG/M2 | HEART RATE: 78 BPM

## 2019-01-17 DIAGNOSIS — E53.8 VITAMIN B12 DEFICIENCY: ICD-10-CM

## 2019-01-17 DIAGNOSIS — R06.9 ABNORMALITY OF BREATHING: ICD-10-CM

## 2019-01-17 DIAGNOSIS — F02.80 DEMENTIA DUE TO ALZHEIMER'S DISEASE (HCC): ICD-10-CM

## 2019-01-17 DIAGNOSIS — R60.0 BILATERAL LOWER EXTREMITY EDEMA: Primary | ICD-10-CM

## 2019-01-17 DIAGNOSIS — R60.0 BILATERAL LOWER EXTREMITY EDEMA: ICD-10-CM

## 2019-01-17 DIAGNOSIS — G30.9 DEMENTIA DUE TO ALZHEIMER'S DISEASE (HCC): ICD-10-CM

## 2019-01-17 PROBLEM — R73.03 PREDIABETES: Status: ACTIVE | Noted: 2019-01-17

## 2019-01-17 LAB
A/G RATIO: 1.2 (ref 1.1–2.2)
ALBUMIN SERPL-MCNC: 3.9 G/DL (ref 3.4–5)
ALP BLD-CCNC: 119 U/L (ref 40–129)
ALT SERPL-CCNC: <5 U/L (ref 10–40)
ANION GAP SERPL CALCULATED.3IONS-SCNC: 18 MMOL/L (ref 3–16)
AST SERPL-CCNC: 11 U/L (ref 15–37)
BILIRUB SERPL-MCNC: 1 MG/DL (ref 0–1)
BUN BLDV-MCNC: 14 MG/DL (ref 7–20)
CALCIUM SERPL-MCNC: 9.8 MG/DL (ref 8.3–10.6)
CHLORIDE BLD-SCNC: 101 MMOL/L (ref 99–110)
CO2: 24 MMOL/L (ref 21–32)
CREAT SERPL-MCNC: 1.1 MG/DL (ref 0.6–1.2)
GFR AFRICAN AMERICAN: 57
GFR NON-AFRICAN AMERICAN: 47
GLOBULIN: 3.2 G/DL
GLUCOSE BLD-MCNC: 110 MG/DL (ref 70–99)
POTASSIUM SERPL-SCNC: 4.3 MMOL/L (ref 3.5–5.1)
PRO-BNP: 4041 PG/ML (ref 0–449)
SODIUM BLD-SCNC: 143 MMOL/L (ref 136–145)
TOTAL PROTEIN: 7.1 G/DL (ref 6.4–8.2)
VITAMIN B-12: 911 PG/ML (ref 211–911)

## 2019-01-17 PROCEDURE — 99214 OFFICE O/P EST MOD 30 MIN: CPT | Performed by: FAMILY MEDICINE

## 2019-01-17 PROCEDURE — G8484 FLU IMMUNIZE NO ADMIN: HCPCS | Performed by: FAMILY MEDICINE

## 2019-01-17 PROCEDURE — 1123F ACP DISCUSS/DSCN MKR DOCD: CPT | Performed by: FAMILY MEDICINE

## 2019-01-17 PROCEDURE — G8427 DOCREV CUR MEDS BY ELIG CLIN: HCPCS | Performed by: FAMILY MEDICINE

## 2019-01-17 PROCEDURE — G8419 CALC BMI OUT NRM PARAM NOF/U: HCPCS | Performed by: FAMILY MEDICINE

## 2019-01-17 PROCEDURE — 4040F PNEUMOC VAC/ADMIN/RCVD: CPT | Performed by: FAMILY MEDICINE

## 2019-01-17 PROCEDURE — 1090F PRES/ABSN URINE INCON ASSESS: CPT | Performed by: FAMILY MEDICINE

## 2019-01-17 PROCEDURE — 1101F PT FALLS ASSESS-DOCD LE1/YR: CPT | Performed by: FAMILY MEDICINE

## 2019-01-17 PROCEDURE — 1036F TOBACCO NON-USER: CPT | Performed by: FAMILY MEDICINE

## 2019-01-17 ASSESSMENT — ENCOUNTER SYMPTOMS
SHORTNESS OF BREATH: 1
COUGH: 0

## 2019-01-18 ENCOUNTER — TELEPHONE (OUTPATIENT)
Dept: FAMILY MEDICINE CLINIC | Age: 84
End: 2019-01-18

## 2019-01-18 DIAGNOSIS — E87.79 OTHER HYPERVOLEMIA: Primary | ICD-10-CM

## 2019-01-18 RX ORDER — FUROSEMIDE 40 MG/1
40 TABLET ORAL DAILY
Qty: 30 TABLET | Refills: 1 | Status: SHIPPED | OUTPATIENT
Start: 2019-01-18 | End: 2019-02-28 | Stop reason: DRUGHIGH

## 2019-01-18 RX ORDER — POTASSIUM CHLORIDE 750 MG/1
10 TABLET, EXTENDED RELEASE ORAL DAILY
Qty: 30 TABLET | Refills: 1 | Status: SHIPPED | OUTPATIENT
Start: 2019-01-18 | End: 2019-02-28 | Stop reason: SDUPTHER

## 2019-01-21 ENCOUNTER — HOSPITAL ENCOUNTER (OUTPATIENT)
Dept: GENERAL RADIOLOGY | Age: 84
Discharge: HOME | End: 2019-01-21
Payer: MEDICARE

## 2019-01-21 ENCOUNTER — HOSPITAL ENCOUNTER (OUTPATIENT)
Age: 84
Discharge: HOME | End: 2019-01-21
Payer: MEDICARE

## 2019-01-21 DIAGNOSIS — R60.0 BILATERAL LOWER EXTREMITY EDEMA: ICD-10-CM

## 2019-01-21 PROCEDURE — 71046 X-RAY EXAM CHEST 2 VIEWS: CPT

## 2019-01-24 DIAGNOSIS — E87.79 OTHER HYPERVOLEMIA: ICD-10-CM

## 2019-01-24 LAB
ANION GAP SERPL CALCULATED.3IONS-SCNC: 18 MMOL/L (ref 3–16)
BUN BLDV-MCNC: 14 MG/DL (ref 7–20)
CALCIUM SERPL-MCNC: 10.3 MG/DL (ref 8.3–10.6)
CHLORIDE BLD-SCNC: 96 MMOL/L (ref 99–110)
CO2: 27 MMOL/L (ref 21–32)
CREAT SERPL-MCNC: 1.3 MG/DL (ref 0.6–1.2)
GFR AFRICAN AMERICAN: 47
GFR NON-AFRICAN AMERICAN: 39
GLUCOSE BLD-MCNC: 127 MG/DL (ref 70–99)
POTASSIUM SERPL-SCNC: 4.1 MMOL/L (ref 3.5–5.1)
SODIUM BLD-SCNC: 141 MMOL/L (ref 136–145)

## 2019-01-25 ENCOUNTER — TELEPHONE (OUTPATIENT)
Dept: FAMILY MEDICINE CLINIC | Age: 84
End: 2019-01-25

## 2019-01-25 DIAGNOSIS — R79.89 ELEVATED SERUM CREATININE: Primary | ICD-10-CM

## 2019-02-01 ENCOUNTER — HOSPITAL ENCOUNTER (OUTPATIENT)
Dept: NON INVASIVE DIAGNOSTICS | Age: 84
Discharge: HOME | End: 2019-02-01
Payer: MEDICARE

## 2019-02-01 DIAGNOSIS — R79.89 ELEVATED SERUM CREATININE: ICD-10-CM

## 2019-02-01 DIAGNOSIS — R60.0 BILATERAL LOWER EXTREMITY EDEMA: ICD-10-CM

## 2019-02-01 LAB
ANION GAP SERPL CALCULATED.3IONS-SCNC: 15 MMOL/L (ref 3–16)
BUN BLDV-MCNC: 11 MG/DL (ref 7–20)
CALCIUM SERPL-MCNC: 10 MG/DL (ref 8.3–10.6)
CHLORIDE BLD-SCNC: 103 MMOL/L (ref 99–110)
CO2: 25 MMOL/L (ref 21–32)
CREAT SERPL-MCNC: 1 MG/DL (ref 0.6–1.2)
GFR AFRICAN AMERICAN: >60
GFR NON-AFRICAN AMERICAN: 52
GLUCOSE BLD-MCNC: 113 MG/DL (ref 70–99)
LV EF: 60 %
LVEF MODALITY: NORMAL
POTASSIUM SERPL-SCNC: 4.2 MMOL/L (ref 3.5–5.1)
SODIUM BLD-SCNC: 143 MMOL/L (ref 136–145)

## 2019-02-01 PROCEDURE — 93306 TTE W/DOPPLER COMPLETE: CPT

## 2019-02-26 ENCOUNTER — CARE COORDINATION (OUTPATIENT)
Dept: CARE COORDINATION | Age: 84
End: 2019-02-26

## 2019-02-26 ENCOUNTER — OFFICE VISIT (OUTPATIENT)
Dept: RHEUMATOLOGY | Age: 84
End: 2019-02-26
Payer: MEDICARE

## 2019-02-26 VITALS
WEIGHT: 130 LBS | HEART RATE: 75 BPM | HEIGHT: 58 IN | OXYGEN SATURATION: 98 % | DIASTOLIC BLOOD PRESSURE: 74 MMHG | BODY MASS INDEX: 27.29 KG/M2 | SYSTOLIC BLOOD PRESSURE: 119 MMHG

## 2019-02-26 DIAGNOSIS — M1A.09X1 IDIOPATHIC CHRONIC GOUT OF MULTIPLE SITES WITH TOPHUS: ICD-10-CM

## 2019-02-26 DIAGNOSIS — M1A.09X1 IDIOPATHIC CHRONIC GOUT OF MULTIPLE SITES WITH TOPHUS: Primary | ICD-10-CM

## 2019-02-26 LAB — URIC ACID, SERUM: 4.7 MG/DL (ref 2.6–6)

## 2019-02-26 PROCEDURE — 1090F PRES/ABSN URINE INCON ASSESS: CPT | Performed by: INTERNAL MEDICINE

## 2019-02-26 PROCEDURE — G8484 FLU IMMUNIZE NO ADMIN: HCPCS | Performed by: INTERNAL MEDICINE

## 2019-02-26 PROCEDURE — G8419 CALC BMI OUT NRM PARAM NOF/U: HCPCS | Performed by: INTERNAL MEDICINE

## 2019-02-26 PROCEDURE — 99213 OFFICE O/P EST LOW 20 MIN: CPT | Performed by: INTERNAL MEDICINE

## 2019-02-26 PROCEDURE — G8427 DOCREV CUR MEDS BY ELIG CLIN: HCPCS | Performed by: INTERNAL MEDICINE

## 2019-02-26 PROCEDURE — 1036F TOBACCO NON-USER: CPT | Performed by: INTERNAL MEDICINE

## 2019-02-26 PROCEDURE — 4040F PNEUMOC VAC/ADMIN/RCVD: CPT | Performed by: INTERNAL MEDICINE

## 2019-02-26 PROCEDURE — 1123F ACP DISCUSS/DSCN MKR DOCD: CPT | Performed by: INTERNAL MEDICINE

## 2019-02-26 PROCEDURE — 1101F PT FALLS ASSESS-DOCD LE1/YR: CPT | Performed by: INTERNAL MEDICINE

## 2019-02-26 RX ORDER — PREDNISONE 1 MG/1
TABLET ORAL
Qty: 20 TABLET | Refills: 1 | Status: ON HOLD | OUTPATIENT
Start: 2019-02-26 | End: 2019-04-02 | Stop reason: HOSPADM

## 2019-02-27 ENCOUNTER — TELEPHONE (OUTPATIENT)
Dept: FAMILY MEDICINE CLINIC | Age: 84
End: 2019-02-27

## 2019-02-28 ENCOUNTER — OFFICE VISIT (OUTPATIENT)
Dept: FAMILY MEDICINE CLINIC | Age: 84
End: 2019-02-28
Payer: MEDICARE

## 2019-02-28 VITALS
HEIGHT: 58 IN | RESPIRATION RATE: 15 BRPM | TEMPERATURE: 97.6 F | HEART RATE: 96 BPM | DIASTOLIC BLOOD PRESSURE: 82 MMHG | BODY MASS INDEX: 27.17 KG/M2 | OXYGEN SATURATION: 98 % | SYSTOLIC BLOOD PRESSURE: 128 MMHG

## 2019-02-28 DIAGNOSIS — R22.43 LOCALIZED SWELLING OF BOTH LOWER LEGS: Primary | ICD-10-CM

## 2019-02-28 DIAGNOSIS — E87.79 OTHER HYPERVOLEMIA: ICD-10-CM

## 2019-02-28 PROCEDURE — G8484 FLU IMMUNIZE NO ADMIN: HCPCS | Performed by: FAMILY MEDICINE

## 2019-02-28 PROCEDURE — G8427 DOCREV CUR MEDS BY ELIG CLIN: HCPCS | Performed by: FAMILY MEDICINE

## 2019-02-28 PROCEDURE — 1101F PT FALLS ASSESS-DOCD LE1/YR: CPT | Performed by: FAMILY MEDICINE

## 2019-02-28 PROCEDURE — 4040F PNEUMOC VAC/ADMIN/RCVD: CPT | Performed by: FAMILY MEDICINE

## 2019-02-28 PROCEDURE — 1036F TOBACCO NON-USER: CPT | Performed by: FAMILY MEDICINE

## 2019-02-28 PROCEDURE — 99213 OFFICE O/P EST LOW 20 MIN: CPT | Performed by: FAMILY MEDICINE

## 2019-02-28 PROCEDURE — 1090F PRES/ABSN URINE INCON ASSESS: CPT | Performed by: FAMILY MEDICINE

## 2019-02-28 PROCEDURE — 1123F ACP DISCUSS/DSCN MKR DOCD: CPT | Performed by: FAMILY MEDICINE

## 2019-02-28 PROCEDURE — G8419 CALC BMI OUT NRM PARAM NOF/U: HCPCS | Performed by: FAMILY MEDICINE

## 2019-02-28 RX ORDER — FUROSEMIDE 20 MG/1
20 TABLET ORAL EVERY OTHER DAY
Qty: 45 TABLET | Refills: 1 | Status: ON HOLD | OUTPATIENT
Start: 2019-02-28 | End: 2019-04-23 | Stop reason: SDUPTHER

## 2019-02-28 RX ORDER — POTASSIUM CHLORIDE 750 MG/1
10 TABLET, EXTENDED RELEASE ORAL EVERY OTHER DAY
Qty: 45 TABLET | Refills: 1 | Status: ON HOLD | OUTPATIENT
Start: 2019-02-28 | End: 2019-04-26 | Stop reason: HOSPADM

## 2019-02-28 ASSESSMENT — PATIENT HEALTH QUESTIONNAIRE - PHQ9
SUM OF ALL RESPONSES TO PHQ QUESTIONS 1-9: 0
SUM OF ALL RESPONSES TO PHQ QUESTIONS 1-9: 0
SUM OF ALL RESPONSES TO PHQ9 QUESTIONS 1 & 2: 0
1. LITTLE INTEREST OR PLEASURE IN DOING THINGS: 0
2. FEELING DOWN, DEPRESSED OR HOPELESS: 0

## 2019-03-04 ASSESSMENT — ENCOUNTER SYMPTOMS
COUGH: 1
SHORTNESS OF BREATH: 1

## 2019-03-11 DIAGNOSIS — R22.43 LOCALIZED SWELLING OF BOTH LOWER LEGS: ICD-10-CM

## 2019-03-11 LAB
ANION GAP SERPL CALCULATED.3IONS-SCNC: 16 MMOL/L (ref 3–16)
BUN BLDV-MCNC: 13 MG/DL (ref 7–20)
CALCIUM SERPL-MCNC: 9.4 MG/DL (ref 8.3–10.6)
CHLORIDE BLD-SCNC: 102 MMOL/L (ref 99–110)
CO2: 23 MMOL/L (ref 21–32)
CREAT SERPL-MCNC: 1.3 MG/DL (ref 0.6–1.2)
GFR AFRICAN AMERICAN: 47
GFR NON-AFRICAN AMERICAN: 39
GLUCOSE BLD-MCNC: 186 MG/DL (ref 70–99)
POTASSIUM SERPL-SCNC: 3.8 MMOL/L (ref 3.5–5.1)
SODIUM BLD-SCNC: 141 MMOL/L (ref 136–145)

## 2019-03-12 DIAGNOSIS — R79.89 ELEVATED SERUM CREATININE: Primary | ICD-10-CM

## 2019-03-22 ENCOUNTER — CARE COORDINATION (OUTPATIENT)
Dept: CARE COORDINATION | Age: 84
End: 2019-03-22

## 2019-03-29 ENCOUNTER — APPOINTMENT (OUTPATIENT)
Dept: CT IMAGING | Age: 84
DRG: 871 | End: 2019-03-29
Payer: MEDICARE

## 2019-03-29 ENCOUNTER — CARE COORDINATION (OUTPATIENT)
Dept: CARE COORDINATION | Age: 84
End: 2019-03-29

## 2019-03-29 ENCOUNTER — APPOINTMENT (OUTPATIENT)
Dept: GENERAL RADIOLOGY | Age: 84
DRG: 871 | End: 2019-03-29
Payer: MEDICARE

## 2019-03-29 ENCOUNTER — HOSPITAL ENCOUNTER (INPATIENT)
Age: 84
LOS: 4 days | Discharge: NURSING, REHAB OR LONG-TERM CARE | DRG: 871 | End: 2019-04-02
Attending: EMERGENCY MEDICINE | Admitting: INTERNAL MEDICINE
Payer: MEDICARE

## 2019-03-29 DIAGNOSIS — A41.9 SEPTICEMIA (HCC): Primary | ICD-10-CM

## 2019-03-29 DIAGNOSIS — J81.0 ACUTE PULMONARY EDEMA (HCC): ICD-10-CM

## 2019-03-29 DIAGNOSIS — J18.9 PNEUMONIA DUE TO ORGANISM: ICD-10-CM

## 2019-03-29 DIAGNOSIS — J96.01 ACUTE RESPIRATORY FAILURE WITH HYPOXIA (HCC): ICD-10-CM

## 2019-03-29 PROBLEM — J16.0 CAP (COMMUNITY ACQUIRED PNEUMONIA) DUE TO CHLAMYDIA SPECIES: Status: ACTIVE | Noted: 2019-03-29

## 2019-03-29 PROBLEM — I50.43 CHF (CONGESTIVE HEART FAILURE), NYHA CLASS I, ACUTE ON CHRONIC, COMBINED (HCC): Status: ACTIVE | Noted: 2019-03-29

## 2019-03-29 LAB
A/G RATIO: 0.9 (ref 1.1–2.2)
ALBUMIN SERPL-MCNC: 3.6 G/DL (ref 3.4–5)
ALP BLD-CCNC: 121 U/L (ref 40–129)
ALT SERPL-CCNC: 7 U/L (ref 10–40)
ANION GAP SERPL CALCULATED.3IONS-SCNC: 24 MMOL/L (ref 3–16)
AST SERPL-CCNC: 23 U/L (ref 15–37)
BACTERIA: ABNORMAL /HPF
BASOPHILS ABSOLUTE: 0 K/UL (ref 0–0.2)
BASOPHILS RELATIVE PERCENT: 0.1 %
BILIRUB SERPL-MCNC: 1.4 MG/DL (ref 0–1)
BILIRUBIN URINE: NEGATIVE
BLOOD, URINE: ABNORMAL
BUN BLDV-MCNC: 25 MG/DL (ref 7–20)
CALCIUM SERPL-MCNC: 10.2 MG/DL (ref 8.3–10.6)
CHLORIDE BLD-SCNC: 100 MMOL/L (ref 99–110)
CLARITY: ABNORMAL
CO2: 16 MMOL/L (ref 21–32)
COLOR: ABNORMAL
COMMENT UA: ABNORMAL
CREAT SERPL-MCNC: 1.4 MG/DL (ref 0.6–1.2)
EOSINOPHILS ABSOLUTE: 0 K/UL (ref 0–0.6)
EOSINOPHILS RELATIVE PERCENT: 0 %
EPITHELIAL CELLS, UA: 2 /HPF (ref 0–5)
GFR AFRICAN AMERICAN: 43
GFR NON-AFRICAN AMERICAN: 35
GLOBULIN: 4.1 G/DL
GLUCOSE BLD-MCNC: 289 MG/DL (ref 70–99)
GLUCOSE BLD-MCNC: 290 MG/DL (ref 70–99)
GLUCOSE BLD-MCNC: 390 MG/DL (ref 70–99)
GLUCOSE URINE: NEGATIVE MG/DL
HCT VFR BLD CALC: 50.4 % (ref 36–48)
HEMOGLOBIN: 15.8 G/DL (ref 12–16)
HYALINE CASTS: 2 /LPF (ref 0–8)
KETONES, URINE: 15 MG/DL
LACTIC ACID: 7.3 MMOL/L (ref 0.4–2)
LACTIC ACID: 8.1 MMOL/L (ref 0.4–2)
LEUKOCYTE ESTERASE, URINE: ABNORMAL
LIPASE: 8 U/L (ref 13–60)
LYMPHOCYTES ABSOLUTE: 1 K/UL (ref 1–5.1)
LYMPHOCYTES RELATIVE PERCENT: 5.6 %
MCH RBC QN AUTO: 26.7 PG (ref 26–34)
MCHC RBC AUTO-ENTMCNC: 31.3 G/DL (ref 31–36)
MCV RBC AUTO: 85.4 FL (ref 80–100)
MICROSCOPIC EXAMINATION: YES
MONOCYTES ABSOLUTE: 1.4 K/UL (ref 0–1.3)
MONOCYTES RELATIVE PERCENT: 7.6 %
NEUTROPHILS ABSOLUTE: 15.5 K/UL (ref 1.7–7.7)
NEUTROPHILS RELATIVE PERCENT: 86.7 %
NITRITE, URINE: NEGATIVE
PDW BLD-RTO: 18.8 % (ref 12.4–15.4)
PERFORMED ON: ABNORMAL
PERFORMED ON: ABNORMAL
PH UA: 5.5 (ref 5–8)
PLATELET # BLD: 257 K/UL (ref 135–450)
PMV BLD AUTO: 9.6 FL (ref 5–10.5)
POTASSIUM SERPL-SCNC: 4.5 MMOL/L (ref 3.5–5.1)
PRO-BNP: ABNORMAL PG/ML (ref 0–449)
PROCALCITONIN: 1.88 NG/ML (ref 0–0.15)
PROTEIN UA: 100 MG/DL
RAPID INFLUENZA  B AGN: NEGATIVE
RAPID INFLUENZA A AGN: NEGATIVE
RBC # BLD: 5.9 M/UL (ref 4–5.2)
RBC UA: 2 /HPF (ref 0–4)
SODIUM BLD-SCNC: 140 MMOL/L (ref 136–145)
SPECIFIC GRAVITY UA: 1.02 (ref 1–1.03)
TOTAL PROTEIN: 7.7 G/DL (ref 6.4–8.2)
TROPONIN: 0.01 NG/ML
TROPONIN: <0.01 NG/ML
TSH SERPL DL<=0.05 MIU/L-ACNC: 0.85 UIU/ML (ref 0.27–4.2)
URINE REFLEX TO CULTURE: YES
URINE TYPE: ABNORMAL
UROBILINOGEN, URINE: 0.2 E.U./DL
WBC # BLD: 17.8 K/UL (ref 4–11)
WBC UA: 3 /HPF (ref 0–5)

## 2019-03-29 PROCEDURE — 87040 BLOOD CULTURE FOR BACTERIA: CPT

## 2019-03-29 PROCEDURE — 96375 TX/PRO/DX INJ NEW DRUG ADDON: CPT

## 2019-03-29 PROCEDURE — 83605 ASSAY OF LACTIC ACID: CPT

## 2019-03-29 PROCEDURE — 6370000000 HC RX 637 (ALT 250 FOR IP): Performed by: PHYSICIAN ASSISTANT

## 2019-03-29 PROCEDURE — 83690 ASSAY OF LIPASE: CPT

## 2019-03-29 PROCEDURE — 6370000000 HC RX 637 (ALT 250 FOR IP): Performed by: NURSE PRACTITIONER

## 2019-03-29 PROCEDURE — 2060000000 HC ICU INTERMEDIATE R&B

## 2019-03-29 PROCEDURE — 81001 URINALYSIS AUTO W/SCOPE: CPT

## 2019-03-29 PROCEDURE — 6360000002 HC RX W HCPCS: Performed by: NURSE PRACTITIONER

## 2019-03-29 PROCEDURE — 2580000003 HC RX 258: Performed by: INTERNAL MEDICINE

## 2019-03-29 PROCEDURE — 71045 X-RAY EXAM CHEST 1 VIEW: CPT

## 2019-03-29 PROCEDURE — 83880 ASSAY OF NATRIURETIC PEPTIDE: CPT

## 2019-03-29 PROCEDURE — 2580000003 HC RX 258: Performed by: NURSE PRACTITIONER

## 2019-03-29 PROCEDURE — 87086 URINE CULTURE/COLONY COUNT: CPT

## 2019-03-29 PROCEDURE — 2700000000 HC OXYGEN THERAPY PER DAY

## 2019-03-29 PROCEDURE — 51701 INSERT BLADDER CATHETER: CPT

## 2019-03-29 PROCEDURE — 94664 DEMO&/EVAL PT USE INHALER: CPT

## 2019-03-29 PROCEDURE — 84484 ASSAY OF TROPONIN QUANT: CPT

## 2019-03-29 PROCEDURE — 87804 INFLUENZA ASSAY W/OPTIC: CPT

## 2019-03-29 PROCEDURE — 6360000002 HC RX W HCPCS: Performed by: INTERNAL MEDICINE

## 2019-03-29 PROCEDURE — 99285 EMERGENCY DEPT VISIT HI MDM: CPT

## 2019-03-29 PROCEDURE — 94762 N-INVAS EAR/PLS OXIMTRY CONT: CPT

## 2019-03-29 PROCEDURE — 80053 COMPREHEN METABOLIC PANEL: CPT

## 2019-03-29 PROCEDURE — 96365 THER/PROPH/DIAG IV INF INIT: CPT

## 2019-03-29 PROCEDURE — 85025 COMPLETE CBC W/AUTO DIFF WBC: CPT

## 2019-03-29 PROCEDURE — 36415 COLL VENOUS BLD VENIPUNCTURE: CPT

## 2019-03-29 PROCEDURE — 93005 ELECTROCARDIOGRAM TRACING: CPT | Performed by: NURSE PRACTITIONER

## 2019-03-29 PROCEDURE — 71250 CT THORAX DX C-: CPT

## 2019-03-29 PROCEDURE — 94760 N-INVAS EAR/PLS OXIMETRY 1: CPT

## 2019-03-29 PROCEDURE — 84443 ASSAY THYROID STIM HORMONE: CPT

## 2019-03-29 PROCEDURE — 96367 TX/PROPH/DG ADDL SEQ IV INF: CPT

## 2019-03-29 PROCEDURE — 94640 AIRWAY INHALATION TREATMENT: CPT

## 2019-03-29 PROCEDURE — 84145 PROCALCITONIN (PCT): CPT

## 2019-03-29 PROCEDURE — 96361 HYDRATE IV INFUSION ADD-ON: CPT

## 2019-03-29 RX ORDER — NICOTINE POLACRILEX 4 MG
15 LOZENGE BUCCAL PRN
Status: DISCONTINUED | OUTPATIENT
Start: 2019-03-29 | End: 2019-04-02 | Stop reason: HOSPADM

## 2019-03-29 RX ORDER — 0.9 % SODIUM CHLORIDE 0.9 %
30 INTRAVENOUS SOLUTION INTRAVENOUS ONCE
Status: COMPLETED | OUTPATIENT
Start: 2019-03-29 | End: 2019-03-29

## 2019-03-29 RX ORDER — DEXTROSE MONOHYDRATE 25 G/50ML
12.5 INJECTION, SOLUTION INTRAVENOUS PRN
Status: DISCONTINUED | OUTPATIENT
Start: 2019-03-29 | End: 2019-04-02 | Stop reason: HOSPADM

## 2019-03-29 RX ORDER — ESCITALOPRAM OXALATE 10 MG/1
10 TABLET ORAL DAILY
Status: DISCONTINUED | OUTPATIENT
Start: 2019-03-29 | End: 2019-04-02 | Stop reason: HOSPADM

## 2019-03-29 RX ORDER — POTASSIUM CHLORIDE 20 MEQ/1
10 TABLET, EXTENDED RELEASE ORAL EVERY OTHER DAY
Status: DISCONTINUED | OUTPATIENT
Start: 2019-03-30 | End: 2019-04-02 | Stop reason: HOSPADM

## 2019-03-29 RX ORDER — SODIUM CHLORIDE 0.9 % (FLUSH) 0.9 %
10 SYRINGE (ML) INJECTION EVERY 12 HOURS SCHEDULED
Status: DISCONTINUED | OUTPATIENT
Start: 2019-03-29 | End: 2019-04-02 | Stop reason: HOSPADM

## 2019-03-29 RX ORDER — ALLOPURINOL 100 MG/1
100 TABLET ORAL DAILY
Status: DISCONTINUED | OUTPATIENT
Start: 2019-03-29 | End: 2019-04-02 | Stop reason: HOSPADM

## 2019-03-29 RX ORDER — IPRATROPIUM BROMIDE AND ALBUTEROL SULFATE 2.5; .5 MG/3ML; MG/3ML
1 SOLUTION RESPIRATORY (INHALATION) ONCE
Status: COMPLETED | OUTPATIENT
Start: 2019-03-29 | End: 2019-03-29

## 2019-03-29 RX ORDER — FUROSEMIDE 10 MG/ML
40 INJECTION INTRAMUSCULAR; INTRAVENOUS ONCE
Status: COMPLETED | OUTPATIENT
Start: 2019-03-29 | End: 2019-03-29

## 2019-03-29 RX ORDER — ONDANSETRON 2 MG/ML
4 INJECTION INTRAMUSCULAR; INTRAVENOUS EVERY 6 HOURS PRN
Status: DISCONTINUED | OUTPATIENT
Start: 2019-03-29 | End: 2019-04-02 | Stop reason: HOSPADM

## 2019-03-29 RX ORDER — SODIUM CHLORIDE 0.9 % (FLUSH) 0.9 %
10 SYRINGE (ML) INJECTION PRN
Status: DISCONTINUED | OUTPATIENT
Start: 2019-03-29 | End: 2019-04-02 | Stop reason: HOSPADM

## 2019-03-29 RX ORDER — ASPIRIN 81 MG/1
81 TABLET, CHEWABLE ORAL EVERY OTHER DAY
Status: DISCONTINUED | OUTPATIENT
Start: 2019-03-29 | End: 2019-03-31

## 2019-03-29 RX ORDER — DEXTROSE MONOHYDRATE 50 MG/ML
100 INJECTION, SOLUTION INTRAVENOUS PRN
Status: DISCONTINUED | OUTPATIENT
Start: 2019-03-29 | End: 2019-04-02 | Stop reason: HOSPADM

## 2019-03-29 RX ORDER — FUROSEMIDE 10 MG/ML
20 INJECTION INTRAMUSCULAR; INTRAVENOUS 2 TIMES DAILY
Status: DISCONTINUED | OUTPATIENT
Start: 2019-03-29 | End: 2019-03-30

## 2019-03-29 RX ORDER — COLCHICINE 0.6 MG/1
0.6 TABLET ORAL DAILY
Status: DISCONTINUED | OUTPATIENT
Start: 2019-03-29 | End: 2019-04-02 | Stop reason: HOSPADM

## 2019-03-29 RX ORDER — ONDANSETRON 2 MG/ML
4 INJECTION INTRAMUSCULAR; INTRAVENOUS ONCE
Status: COMPLETED | OUTPATIENT
Start: 2019-03-29 | End: 2019-03-29

## 2019-03-29 RX ORDER — PANTOPRAZOLE SODIUM 40 MG/1
40 TABLET, DELAYED RELEASE ORAL DAILY
Status: DISCONTINUED | OUTPATIENT
Start: 2019-03-29 | End: 2019-04-02 | Stop reason: HOSPADM

## 2019-03-29 RX ADMIN — FUROSEMIDE 20 MG: 10 INJECTION, SOLUTION INTRAMUSCULAR; INTRAVENOUS at 17:15

## 2019-03-29 RX ADMIN — IPRATROPIUM BROMIDE AND ALBUTEROL SULFATE 1 AMPULE: .5; 3 SOLUTION RESPIRATORY (INHALATION) at 15:56

## 2019-03-29 RX ADMIN — AMPICILLIN SODIUM AND SULBACTAM SODIUM 1.5 G: 1; .5 INJECTION, POWDER, FOR SOLUTION INTRAMUSCULAR; INTRAVENOUS at 23:37

## 2019-03-29 RX ADMIN — ONDANSETRON 4 MG: 2 INJECTION INTRAMUSCULAR; INTRAVENOUS at 13:32

## 2019-03-29 RX ADMIN — FUROSEMIDE 20 MG: 10 INJECTION, SOLUTION INTRAMUSCULAR; INTRAVENOUS at 19:15

## 2019-03-29 RX ADMIN — PIPERACILLIN SODIUM,TAZOBACTAM SODIUM 4.5 G: 4; .5 INJECTION, POWDER, FOR SOLUTION INTRAVENOUS at 16:05

## 2019-03-29 RX ADMIN — AZITHROMYCIN MONOHYDRATE 500 MG: 500 INJECTION, POWDER, LYOPHILIZED, FOR SOLUTION INTRAVENOUS at 14:50

## 2019-03-29 RX ADMIN — Medication 10 ML: at 20:42

## 2019-03-29 RX ADMIN — INSULIN LISPRO 3 UNITS: 100 INJECTION, SOLUTION INTRAVENOUS; SUBCUTANEOUS at 23:56

## 2019-03-29 RX ADMIN — SODIUM CHLORIDE 1785 ML: 9 INJECTION, SOLUTION INTRAVENOUS at 13:29

## 2019-03-29 RX ADMIN — IPRATROPIUM BROMIDE AND ALBUTEROL SULFATE 1 AMPULE: .5; 3 SOLUTION RESPIRATORY (INHALATION) at 13:33

## 2019-03-29 ASSESSMENT — PAIN SCALES - PAIN ASSESSMENT IN ADVANCED DEMENTIA (PAINAD)
TOTALSCORE: 0
BREATHING: 0
CONSOLABILITY: 0
TOTALSCORE: 0
FACIALEXPRESSION: 0
BODYLANGUAGE: 0
FACIALEXPRESSION: 0
BODYLANGUAGE: 0
NEGVOCALIZATION: 0
CONSOLABILITY: 0
BREATHING: 0
NEGVOCALIZATION: 0

## 2019-03-29 NOTE — ED NOTES
Zithromax initiated per order. Pt remains A & O x 1. HR 98. Spo2 95% on 3L NC. Now to CT via stretcher.       Karishma Rivers RN  03/29/19 9218

## 2019-03-29 NOTE — ED PROVIDER NOTES
I independently evaluated and obtained a history and physical on [facility]. All diagnostic, treatment, and disposition assistants were made to myself in conjunction the advanced practice provider. For further details of this patient's emergency department encounter, please see the advanced practice provider's documentation. History: 80-year-old female with a history of type 2 diabetes, esophageal strictures status post dilation a few months ago spinal stenosis presents to the ER with complaint of vomiting last night and worsening respiratory symptoms today. Her daughter is here and states that at patient's baseline she is confused some days at her baseline. States that she cannot really offer any history. She also does not live with her so is unsure exactly what happened. Her sister told her that the patient vomited last night while eating food. Today was having some worsening trouble breathing. No other history provided. Echocardiogram from February 2019 shows an ejection fraction of 20%. Physician Exam: Constitutional: No acute distress, heart: Regular rate rhythm, no murmur, respiratory: Crackles bilaterally, abdomen: Soft, nontender, nondistended, muscular skeletal: No edema noted    EKG: Sinus tachycardia, rate 103, normal QRS, normal QT, no ST depression or elevation, Q waves in the inferior and anterior leads    All EKG's are interpreted by the Emergency Department Physician who either signs or Co-signs this chart in the absence of a cardiologist.      MDM:  Patient resists to the ER with worsening shortness of breath after episode of nausea vomiting. Her oxygen saturation is 90%. Her blood pressure is borderline low with systolically of being 023-860. She is receiving IV fluids and a septic workup. Her BNP came back at greater than 40,000. Her white count is elevated. Her lactic acid is 8.   His sounds like she has crackles bilaterally in the chest x-ray shows pulmonary aspect congestion will slow down on fluids per family's request.  We'll get a CT chest to better delineate pneumonia versus pulmonary edema. CT scan shows pulmonary edema as well as pneumonia. Patient was treated with Zosyn and azithromycin for antibiotic therapy. They've aspect his rider discussed with the hospitalist as the lactic acid is elevated. The patient appears well and has pulmonary edema. We discussed with the family about IV fluids and they are refusing the IV fluids as the patient sounds well and has pulmonary edema. We'll give Lasix. Initial IV fluids that were ordered were stopped. Patient received around 500 mL of IV fluids upon arrival.  Remain on nasal cannula with an O2 saturation greater than 92%. No respiratory distress. Patient will be admitted to the hospital for further evaluation. ED crit    CRITICAL CARE:  The high probability of sudden, clinically significant deterioration in the patient's condition required the highest level of my preparedness to intervene urgently. The services I provided to this patient were to treat and/or prevent clinically significant deterioration that could result in:respiratory collapse. Services included the following: chart data review, reviewing nursing notes and/or old charts, documentation time, consultant collaboration regarding findings and treatment options, medication orders and management, direct patient care, re-evaluations, vital sign assessments and ordering, interpreting and reviewing diagnostic studies/lab tests. Aggregate critical care time was 45 minutes, which includes only time during which I was engaged in work directly related to the patient's care, as described above, whether at the bedside or elsewhere in the Emergency Department. It did not include time spent performing other reported procedures or the services of residents, students, nurses or physician assistants.         Diagnosis  Acute respiratory failure with

## 2019-03-29 NOTE — ED NOTES
IVFs adjusted to [facility] per verbal order from Juana Damno NP until CT chest obtained.       Charlie Gaytan RN  03/29/19 5864

## 2019-03-29 NOTE — ED PROVIDER NOTES
[address] [facility] [address] [address]  Dept: [phone]  Loc: [facility] Sofie Bautista [place] COMPLAINT    Chief Complaint   Patient presents with    Emesis     N&V started last night. per EMS family states possible UTI        HPI    Benjy Miramontes is a 80 y.o. female who presents to the emergency department with the daughter with complaints of hypoxia after vomiting. The patient has a history of dementia/Alzheimer's and was having a normal day yesterday until after she ate dinner. The daughter reports the patient vomited several times. The patient at began having tachypnea and tachycardia. She checked her pulse oximetry and she reported a pulse ox of 87% on room air. He states this morning she vomited again and brought her to the emergency department. She has no history of lung issues she does not wear oxygen at home. The daughter is also noticing that her urine has a strong odor to it this morning. The patient is pleasantly confused and I am unable to elicit any review of systems. The daughter reports that she was at her normal baseline and was feeling fine up until after eating dinner yesterday. No history of heart failure. She had a normal echocardiogram on February 1 of this year with a normal EF. No recent hospitalizations or nursing home admissions. REVIEW OF SYSTEMS    GI: see HPI, + vomiting, denies diarrhea, no hematochezia  Pulmonary: + difficulty breathing, +cough  General: No fevers  : No hematuria  See HPI for further details. All other systems reviewed and are negative.   Review of systems per daughter    PAST MEDICAL & SURGICAL HISTORY    Past Medical History:   Diagnosis Date    Cataract     Chronic midline low back pain without sciatica     Essential hypertension     GERD (gastroesophageal reflux disease)     Gouty arthropathy     History of breast cancer     Hypercholesterolemia     Incontinence     Lumbar spondylosis     Osteoporosis of multiple sites     Primary osteoarthritis involving multiple joints     Spinal stenosis, lumbar     Type 2 diabetes mellitus without complication, without long-term current use of insulin [facility])      Past Surgical History:   Procedure Laterality Date    CATARACT REMOVAL  jan 2012    dr Didi Lind left   Johnnie Daily  Nov. 2012    dr Didi Lind  right   [address] N/A 12/6/2018    ESOPHAGEAL DILATION Jonas Saltness performed by Sarah Hernandez MD at [address] 12/13/2018    ESOPHAGEAL DILATION Jonas Saltness performed by Sarah Hernandez MD at [facility] ENDOSCOPY N/A 12/6/2018    EGD ESOPHAGOGASTRODUODENOSCOPY performed by Sarah Hernandez MD at [address]  12/13/2018    with [address] dil    UPPER GASTROINTESTINAL ENDOSCOPY N/A 12/13/2018    EGD DIAGNOSTIC ONLY performed by Sarah Hernandez MD at [address] [facility]  (may include discharge medications prescribed in the ED)  Current Outpatient Rx   Medication Sig Dispense Refill    potassium chloride (KLOR-CON M) 10 MEQ extended release tablet Take 1 tablet by mouth every other day 45 tablet 1    furosemide (LASIX) 20 MG tablet Take 1 tablet by mouth every other day 45 tablet 1    folic acid (FOLVITE) 1 MG tablet TAKE 5 TABLETS (TOTAL 5MG) BY MOUTH DAILY 150 tablet 3    allopurinol (ZYLOPRIM) 100 MG tablet TAKE 0.5 TABLETS BY MOUTH DAILY FOR FOUR WEEKS AND THEN INCREASE TO 1 TABLET DAILY 30 tablet 2    COLCRYS 0.6 MG tablet TAKE ONE TABLET BY MOUTH DAILY 30 tablet 2    pantoprazole (PROTONIX) 40 MG tablet Take 1 tablet by mouth daily 30 tablet 6    escitalopram (LEXAPRO) 10 MG tablet TAKE ONE TABLET BY MOUTH DAILY 30 tablet 5    Cyanocobalamin 1000 Well developed, well nourished, no acute distress  Eyes:  Sclera nonicteric, erythema or exudate, conjunctiva moist  HENT:  Atraumatic, nose normal  Neck: Supple, no JVD  Respiratory:  Breath sounds with fine crackles throughout bilaterally. Do not clear with cough. Right side also has rhonchi posteriorly in the right upper and middle lobes. No cough noted. Respiratory rate 28 breaths per minute on my physical exam.  On O2 at 2 L per nasal cannula. No retractions. No accessory muscle use. Cardiovascular:  Regular rhythm with a tachycardic rate at 108 bpm, S1 and S2. No murmurs. Radial, pedal and posttibial pulses 2+ equal bilaterally. No peripheral edema. GI:  Soft nontender nondistended abdomen without rebound or guarding. Musculoskeletal:  No edema, no acute deformities  Integument: No rashes, skin dry, poor skin turgor. Neurologic:  Alert & oriented to self and family members. Pleasantly confused. No slurred speech  Psychiatric: Cooperative, pleasant affect     EKG          RADIOLOGY/PROCEDURES    CT CHEST WO CONTRAST   Final Result   Bilateral airspace disease. Findings likely represent a combination of mild   edema and pneumonia. Some of these areas are somewhat nodular in appearance. Follow-up to resolution recommended to exclude underlying lesion. There is mucous plugging within the left lower lobe airways. Large hiatal hernia. XR CHEST PORTABLE   Final Result   Cardiomegaly with mild pulmonary vascular congestion.   No other significant   findings in the chest.           Labs Reviewed   URINE RT REFLEX TO CULTURE - Abnormal; Notable for the following components:       Result Value    Clarity, UA CLOUDY (*)     Ketones, Urine 15 (*)     Blood, Urine SMALL (*)     Protein,  (*)     Leukocyte Esterase, Urine TRACE (*)     All other components within normal limits    Narrative:     Performed at:  [facility]  [address] [place] [address] [facility] [employer]   Phone [phone]   CBC WITH AUTO DIFFERENTIAL - Abnormal; Notable for the following components:    WBC 17.8 (*)     RBC 5.90 (*)     Hematocrit 50.4 (*)     RDW 18.8 (*)     Neutrophils # 15.5 (*)     Monocytes # 1.4 (*)     All other components within normal limits    Narrative:     Performed at:  [address] [place] [address]   Phone [phone]   COMPREHENSIVE METABOLIC PANEL - Abnormal; Notable for the following components:    CO2 16 (*)     Anion Gap 24 (*)     Glucose 290 (*)     BUN 25 (*)     CREATININE 1.4 (*)     GFR Non- 35 (*)     GFR African American 43 (*)     Albumin/Globulin Ratio 0.9 (*)     Total Bilirubin 1.4 (*)     ALT 7 (*)     All other components within normal limits    Narrative:     Performed at:  [address] [email or web address]   Phone [phone]   LIPASE - Abnormal; Notable for the following components:    Lipase 8.0 (*)     All other components within normal limits    Narrative:     Performed at:  [address] [email or web address]   Phone [phone]   LACTIC ACID, PLASMA - Abnormal; Notable for the following components:    Lactic Acid 8.1 (*)     All other components within normal limits    Narrative:     Mynor San tel. [phone],  Chemistry results called to and read back by Estefani Moya RN, 03/29/2019  13:22, by ALONA  Performed at:  [address] [email or web address]   Phone [phone]   MICROSCOPIC URINALYSIS - Abnormal; Notable for the following components:    Bacteria, UA 4+ (*)     All other components within normal limits    Narrative:     Performed at:  [address] [email or web address]   Phone [phone]   [address] - Abnormal; Notable for the following components:    Pro-BNP 47,100 (*)     All other components within normal limits    Narrative:     Performed at:  [facility]  [address] [facility] [address]   Phone [phone]   RAPID INFLUENZA A/B ANTIGENS    Narrative:     Performed at:  [facility]  [address] [facility] [place] [address]   Phone [phone]   CULTURE BLOOD #1   CULTURE BLOOD #2   URINE CULTURE   LACTIC ACID, PLASMA   PROCALCITONIN   TROPONIN       ED COURSE & MEDICAL DECISION MAKING    Pertinent Labs & Imaging studies reviewed and interpreted. (See chart for details)     See chart for details of medications given during the ED stay. Vitals:    03/29/19 1332 03/29/19 1335 03/29/19 1450 03/29/19 1533   BP: 104/61  (!) 98/53 (!) 108/54   Pulse: 103  98 109   Resp: 24 28 28 26   Temp:       TempSrc:       SpO2: 94% 94% 95% 98%   Weight:       Height:         Medications   piperacillin-tazobactam (ZOSYN) 4.5 g in dextrose 5 % 100 mL IVPB (mini-bag) (4.5 g Intravenous New Bag 3/29/19 1605)   furosemide (LASIX) injection 40 mg (has no administration in time range)   ondansetron (ZOFRAN) injection 4 mg (4 mg Intravenous Given 3/29/19 1332)   0.9 % sodium chloride bolus (0 mL/kg × 59.5 kg Intravenous Stopped 3/29/19 1603)   ipratropium-albuterol (DUONEB) nebulizer solution 1 ampule (1 ampule Inhalation Given 3/29/19 1333)   azithromycin (ZITHROMAX) 500 mg in D5W 250ml addavial (0 mg Intravenous Stopped 3/29/19 1603)   ipratropium-albuterol (DUONEB) nebulizer solution 1 ampule (1 ampule Inhalation Given 3/29/19 1556)     Patient was seen and evaluated by myself and my attending physician Dr. Eldon Ken. Differential diagnosis includes but is not limited to pneumonia, aspiration pneumonia, heart failure, pulmonary edema, influenza, urinary tract infection, other viral illness. Patient arrives hypoxic and tachypneic with tachycardia. She has borderline hypotensive. She is requiring oxygen. She is not having retractions or using accessory muscles to aid in breathing. She is placed on O2 at 2 L per nasal cannula initially. She has crackles throughout and right sided rhonchi but does not clear with coughing. No history of heart failure. She had a normal echocardiogram on February 1 of this year with an EF of 60%. She does take a small dose of Lasix 20 mg every other day per the daughter. There is a concern for aspiration pneumonia as the patient was not exhibiting any symptoms until after she had a couple episodes of vomiting yesterday evening. I initially gave her a nebulizer treatment with prednisone. She was initially given fluids per sepsis protocol as the daughter reported no history of heart failure and she had no peripheral edema however she had a BNP returned at 47,000. The fluids were stopped. She had a total of 500 mL an. The family requested to have no more fluids. I did relay this information back to my attending physician who agreed. The patient most likely needs to be diuresed. We'll consult with the hospitalist.  The patient has a leukocytosis of almost 18,000 with a left shift. Sodium is 140 with a potassium of 4.5 with hemolysis. Glucose is 290 with an anion gap of 24. BUN 25, creatinine 1.4 with a GFR 35. CO2 is 16. Lipase is 8. Lactic acid is 8.1. Urinalysis is negative for nitrites with trace leukocytes, 4+ bacteria, 3 whites and 2 epithelials. Portable chest shows cardiomegaly with mild pulmonary vascular congestion. No other significant findings in the chest.  The patient was sent for CT chest without contrast and shows bilateral airspace disease. Findings likely represent a combination of mild edema and pneumonia. Some of these areas are somewhat nodular in appearance. There is mucous plugging within the left lower lobe airways. Large hiatal hernia. She was given a couple breathing treatments. Rhonchi did not improved. She continued to have respirations in the mid to high 20s but she appeared comfortable. She remained on O2 at 2 L per nasal cannula. She remained mildly tachycardic. The patient will need inpatient hospitalization. I did speak with Dr. Lamonte Godfrey the hospitalist regarding this patient and he did evaluate the patient while in the emergency department. He agrees that the patient needs to be diuresed. The patient and the family were updated and they agree with the plan of care. CRITICAL CARE NOTE:  There was a high probability of clinically significant life-threatening deterioration of the patient's condition requiring my urgent intervention. Total critical care time is 45 minutes. This includes multiple reevaluations, vital sign monitoring, pulse oximetry monitoring, telemetry monitoring, clinical response to the IV medications, reviewing the nursing notes, consultation time, dictation/documentation time, and interpretation of the labwork. (This time excludes time spent performing procedures). FINAL IMPRESSION    1. Septicemia ([address]    2. Acute respiratory failure with hypoxia (HCC)    3. Pneumonia due to organism    4.  Acute pulmonary edema [facility])        PLAN  Inpatient hospitalization    (Please note that this note was completed with a voice recognition program.  Every attempt was made to edit the dictations, but inevitably there remain words that are mis-transcribed.)       ROBERTO Adams CNP  03/29/19 ROBERTO Gregg CNP  03/29/19 [id number]

## 2019-03-29 NOTE — PROGRESS NOTES
Pt arrived to PCU via stretcher from the ED. Pt accompanied by daughter. Pt is awake, alert to person only. Pt placed on telemetry monitor and verified with CMU. Pt oriented to room call light and safety measures. Pt is very confused and not sure if she understands how to use call light. Bed alarm is on. Respirations easy even no distress. Pt does have moist cough, no sputum noted. Pt's daughter said that pt does walk but is very unsteady.

## 2019-03-29 NOTE — ED NOTES

## 2019-03-29 NOTE — ED NOTES
Pt resting comfortably on stretcher, in NAD. Remains A & O x 1. Remains tachpneic. BP stable. Daughter at bedside. Awaiting CT results and update from provider. Family verbalized understanding. Denies needs at this time. Call light in reach.       Anuja Ramos RN  03/29/19 [id number]

## 2019-03-29 NOTE — ED NOTES
Pt presents to ED via EMS from home for c/o vomiting that started last night. Pt has a hx of dementia and is unable to provide any history. Daughter at bedside lives at home with her and takes care of her. She states she started vomiting lat night. Denies fevers, chills or diarrhea.       Laura Robles RN  03/29/19 9812

## 2019-03-29 NOTE — ED NOTES
Unable to obtain blood cultures after multiple attempts. Called lab at this time.      Juany Hall  03/29/19 [id number]

## 2019-03-29 NOTE — H&P
Hospital Medicine History & Physical      PCP: Zahraa Willingham MD    Date of Admission: 3/29/2019    Date of Service: Pt seen/examined on 3.29.19 . Patient will be admitted  in/to the hospital.    Chief Complaint:  Sob x 2 days, vomit sudden x 2 days ago       History Of Present Illness:      80 y.o. female who presented to [facility] ed  with above complaints. Symptom onset has been acute for a time period of 2 day(s). Severity is described as moderate. Course of her symptoms over time is constant and worsening. Associated with diff breathing   Not Associated with fever, chills   No h/o of recent travel, ill contacts,weight loss.   Pain description -  No pain       Past Medical History:          Diagnosis Date    Cataract     Chronic midline low back pain without sciatica     Essential hypertension     GERD (gastroesophageal reflux disease)     Gouty arthropathy     History of breast cancer     Hypercholesterolemia     Incontinence     Lumbar spondylosis     Osteoporosis of multiple sites     Primary osteoarthritis involving multiple joints     Spinal stenosis, lumbar     Type 2 diabetes mellitus without complication, without long-term current use of insulin [facility])        Past Surgical History:          Procedure Laterality Date    CATARACT REMOVAL  jan 2012    dr Miles Morales left    CATARACT REMOVAL  Nov. 2012    dr Miles Morales  right    CHOLECYSTECTOMY      ESOPHAGEAL DILATATION N/A 12/6/2018    ESOPHAGEAL DILATION Tami Jurado performed by Sirena Reece MD at [address] N/A 12/13/2018    ESOPHAGEAL DILATION Tami Jurado performed by Sirena Reece MD at [facility] ENDOSCOPY N/A 12/6/2018    EGD ESOPHAGOGASTRODUODENOSCOPY performed by Sirena Reece MD at [facility]  12/13/2018    with [address] dil    UPPER GASTROINTESTINAL ENDOSCOPY N/A 12/13/2018    EGD DIAGNOSTIC ONLY performed by Maame Pizano [facility] at [facility]       Medications Prior to Admission:      Prior to Admission medications    Medication Sig Start Date End Date Taking? Authorizing Provider   potassium chloride (KLOR-CON M) 10 MEQ extended release tablet Take 1 tablet by mouth every other day 2/28/19  Yes Geraldine Ruggiero MD   furosemide (LASIX) 20 MG tablet Take 1 tablet by mouth every other day 2/28/19  Yes Geraldine Ruggiero MD   folic acid (FOLVITE) 1 MG tablet TAKE 5 TABLETS (TOTAL 5MG) BY MOUTH DAILY 2/11/19  Yes Geraldine Ruggiero MD   allopurinol (ZYLOPRIM) 100 MG tablet TAKE 0.5 TABLETS BY MOUTH DAILY FOR FOUR WEEKS AND THEN INCREASE TO 1 TABLET DAILY 1/2/19  Yes Abel Delcid MD   COLCRYS 0.6 MG tablet TAKE ONE TABLET BY MOUTH DAILY 1/2/19  Yes Abel Delcid MD   pantoprazole (PROTONIX) 40 MG tablet Take 1 tablet by mouth daily 12/6/18  Yes Maame Pizano MD   escitalopram (LEXAPRO) 10 MG tablet TAKE ONE TABLET BY MOUTH DAILY 10/18/18  Yes Geraldine Ruggiero MD   Cyanocobalamin 1000 MCG/ML KIT Inject 1,000 mcg weekly for 4 weeks, then monthly afterward 4/4/18  Yes Geraldine Ruggiero MD   predniSONE (DELTASONE) 5 MG tablet Take 4  tabs daily for 2 days, then 3 tabs daily for 2 days, then 2 tabs daily for 2 days, then 1 tab daily for 2 days and then stop 2/26/19   Abel Delcid MD   aspirin 81 MG tablet Take 81 mg by mouth every other day     Historical Provider, MD       Allergies:  Patient has no known allergies. Social History:      The patient currently lives at home       TOBACCO:   reports that she has never smoked. She has never used smokeless tobacco.  ETOH:   reports that she does not drink alcohol. Family History:      Reviewed in detail and d/w patient.   Family History   Problem Relation Age of Onset    No Known Problems Mother     No Known Problems Father             PHYSICAL EXAM PERFORMED BY ME:    BP (!) 108/54   Pulse 109   Temp 97.9 °F (36.6 °C) (Oral)   Resp 26 Ht 5' 2\" (1.575 m)   Wt 131 lb 2.8 oz (59.5 kg)   SpO2 98%   BMI 23.99 kg/m²     General appearance: comfortable, distress- none   HEENT: Atraumatic, Pupils equal, round, and reactive to light. Extra ocular muscles intact. Neck: Supple, with full range of motion. No jugular venous distention. Respiratory:  Crackles on  auscultation , accessory muscle use yes , Rales/Ronchi no  Cardiovascular: Regular rate and rhythm with normal S1/S2 ,  Abdomen: Soft, non-tender, non-distended with normal bowel sounds. Musculoskelatal: No clubbing, no edema bilaterally. Dorsalis pedal pulses +   And capillary refills time is  <  than 3 secs. Skin: Skin color, texture, turgor normal.     Neurologic:   Neurovascularly intact grossly non-focal.      CXR:  I have reviewed the CXR with the following interpretation: cm + poss pneumonai   EKG:  I have reviewed the EKG with the following interpretation: nsr     Labs:     Recent Labs     03/29/19  1253   WBC 17.8*   HGB 15.8   HCT 50.4*        Recent Labs     03/29/19  1253      K 4.5      CO2 16*   BUN 25*   CREATININE 1.4*   CALCIUM 10.2     Recent Labs     03/29/19  1253   AST 23   ALT 7*   BILITOT 1.4*   ALKPHOS 121     No results for input(s): INR in the last 72 hours. No results for input(s): Georga Rout in the last 72 hours. No flowsheet data found. Urinalysis:      Lab Results   Component Value Date    NITRU Negative 03/29/2019    WBCUA 3 03/29/2019    BACTERIA 4+ 03/29/2019    RBCUA 2 03/29/2019    BLOODU SMALL 03/29/2019    SPECGRAV 1.021 03/29/2019    GLUCOSEU Negative 03/29/2019       Reviewed his/her  old charts from [facility], dated 2018 , showed eso dilatation . Diagnostic Assesment and Plan :   1. Admitted for acute hypoxic respiratory failure secondary to possible aspiration pneumonia, pro-calcitonin 1.1, acute acute new onset diastolic CHF, BNP 33,380, lactic acid of 8.   Tachycardia, likely leukocytosis, sepsis at the time of admission secondary to pneumonia, we will treat with IV Unasyn. Gentle diuresis at this time, trend troponins, lactic acid and pro-calcitonin  Body mass index is 23.99 kg/m². Check echocardiogram.  2.  History of esophageal strictures requiring dilatation in December 2018 by Dr. Murtaza Steen. 3.  Chronic stable gout on allopurinol and colchicine. No evidence of joint pain at this time. 4.  History of CAD, no chest pain at this time, continue aspirin. The following items were considered in medical decision making:  Independent review of images, Review / order clinical lab tests, Review / order radiology, tests Decision to obtain old records. DVT Prophylaxis: lvx   Diet: No diet orders on file  Code Status: Prior    PT/OT Eval Status: na   : na     Dispo - will monitor in floor   Needs to stay in hospital for sx control . I have discussed the above stated plan with the patient,  and they verbalized understanding and agreed with the plan. A total time of 15 min were exclusively devoted to discuss plan of care, and advanced care planning during this encounter. RN notified about todays plan  bed side. Bobby Stauffer, [address]  This chart was generated in part by using Dragon Dictation system and may contain errors related to that system including errors in grammar, punctuation, and spelling, as well as words and phrases that may be inappropriate. When dictating, effort is made to correct spelling/grammar errors. If there are any questions or concerns please feel free to contact the dictating provider for clarification. Thank you Dawna Sandifer, MD for the opportunity to be involved in this patient's care. If you have any questions or concerns please feel free to contact me at [phone].

## 2019-03-30 PROBLEM — F03.90 DEMENTIA WITHOUT BEHAVIORAL DISTURBANCE (HCC): Status: ACTIVE | Noted: 2018-04-25

## 2019-03-30 LAB
ANION GAP SERPL CALCULATED.3IONS-SCNC: 14 MMOL/L (ref 3–16)
BASOPHILS ABSOLUTE: 0 K/UL (ref 0–0.2)
BASOPHILS RELATIVE PERCENT: 0.1 %
BUN BLDV-MCNC: 26 MG/DL (ref 7–20)
CALCIUM SERPL-MCNC: 9.1 MG/DL (ref 8.3–10.6)
CHLORIDE BLD-SCNC: 100 MMOL/L (ref 99–110)
CHOLESTEROL, TOTAL: 112 MG/DL (ref 0–199)
CO2: 24 MMOL/L (ref 21–32)
CREAT SERPL-MCNC: 1.6 MG/DL (ref 0.6–1.2)
EKG ATRIAL RATE: 103 BPM
EKG DIAGNOSIS: NORMAL
EKG P AXIS: 88 DEGREES
EKG P-R INTERVAL: 202 MS
EKG Q-T INTERVAL: 306 MS
EKG QRS DURATION: 84 MS
EKG QTC CALCULATION (BAZETT): 400 MS
EKG R AXIS: -22 DEGREES
EKG T AXIS: -33 DEGREES
EKG VENTRICULAR RATE: 103 BPM
EOSINOPHILS ABSOLUTE: 0 K/UL (ref 0–0.6)
EOSINOPHILS RELATIVE PERCENT: 0 %
GFR AFRICAN AMERICAN: 37
GFR NON-AFRICAN AMERICAN: 30
GLUCOSE BLD-MCNC: 133 MG/DL (ref 70–99)
GLUCOSE BLD-MCNC: 138 MG/DL (ref 70–99)
GLUCOSE BLD-MCNC: 140 MG/DL (ref 70–99)
GLUCOSE BLD-MCNC: 150 MG/DL (ref 70–99)
GLUCOSE BLD-MCNC: 164 MG/DL (ref 70–99)
HCT VFR BLD CALC: 40.5 % (ref 36–48)
HDLC SERPL-MCNC: 48 MG/DL (ref 40–60)
HEMOGLOBIN: 12.7 G/DL (ref 12–16)
LACTIC ACID: 1.9 MMOL/L (ref 0.4–2)
LACTIC ACID: 1.9 MMOL/L (ref 0.4–2)
LACTIC ACID: 2.1 MMOL/L (ref 0.4–2)
LACTIC ACID: 5.2 MMOL/L (ref 0.4–2)
LDL CHOLESTEROL CALCULATED: 47 MG/DL
LYMPHOCYTES ABSOLUTE: 1.5 K/UL (ref 1–5.1)
LYMPHOCYTES RELATIVE PERCENT: 9.2 %
MAGNESIUM: 1.4 MG/DL (ref 1.8–2.4)
MCH RBC QN AUTO: 26.5 PG (ref 26–34)
MCHC RBC AUTO-ENTMCNC: 31.3 G/DL (ref 31–36)
MCV RBC AUTO: 84.5 FL (ref 80–100)
MONOCYTES ABSOLUTE: 1.2 K/UL (ref 0–1.3)
MONOCYTES RELATIVE PERCENT: 7.2 %
NEUTROPHILS ABSOLUTE: 13.4 K/UL (ref 1.7–7.7)
NEUTROPHILS RELATIVE PERCENT: 83.5 %
PDW BLD-RTO: 18.6 % (ref 12.4–15.4)
PERFORMED ON: ABNORMAL
PLATELET # BLD: 249 K/UL (ref 135–450)
PMV BLD AUTO: 9.4 FL (ref 5–10.5)
POTASSIUM SERPL-SCNC: 3.7 MMOL/L (ref 3.5–5.1)
RBC # BLD: 4.8 M/UL (ref 4–5.2)
SODIUM BLD-SCNC: 138 MMOL/L (ref 136–145)
TRIGL SERPL-MCNC: 83 MG/DL (ref 0–150)
URINE CULTURE, ROUTINE: NORMAL
VLDLC SERPL CALC-MCNC: 17 MG/DL
WBC # BLD: 16.1 K/UL (ref 4–11)

## 2019-03-30 PROCEDURE — 99223 1ST HOSP IP/OBS HIGH 75: CPT | Performed by: INTERNAL MEDICINE

## 2019-03-30 PROCEDURE — 2700000000 HC OXYGEN THERAPY PER DAY

## 2019-03-30 PROCEDURE — 36415 COLL VENOUS BLD VENIPUNCTURE: CPT

## 2019-03-30 PROCEDURE — 6360000002 HC RX W HCPCS: Performed by: INTERNAL MEDICINE

## 2019-03-30 PROCEDURE — 6370000000 HC RX 637 (ALT 250 FOR IP): Performed by: INTERNAL MEDICINE

## 2019-03-30 PROCEDURE — 6370000000 HC RX 637 (ALT 250 FOR IP): Performed by: PHYSICIAN ASSISTANT

## 2019-03-30 PROCEDURE — 2580000003 HC RX 258: Performed by: INTERNAL MEDICINE

## 2019-03-30 PROCEDURE — 93005 ELECTROCARDIOGRAM TRACING: CPT | Performed by: INTERNAL MEDICINE

## 2019-03-30 PROCEDURE — 94761 N-INVAS EAR/PLS OXIMETRY MLT: CPT

## 2019-03-30 PROCEDURE — 2500000003 HC RX 250 WO HCPCS: Performed by: INTERNAL MEDICINE

## 2019-03-30 PROCEDURE — 80048 BASIC METABOLIC PNL TOTAL CA: CPT

## 2019-03-30 PROCEDURE — 2060000000 HC ICU INTERMEDIATE R&B

## 2019-03-30 PROCEDURE — 85025 COMPLETE CBC W/AUTO DIFF WBC: CPT

## 2019-03-30 PROCEDURE — 83605 ASSAY OF LACTIC ACID: CPT

## 2019-03-30 PROCEDURE — 93010 ELECTROCARDIOGRAM REPORT: CPT | Performed by: INTERNAL MEDICINE

## 2019-03-30 PROCEDURE — 80061 LIPID PANEL: CPT

## 2019-03-30 PROCEDURE — 94664 DEMO&/EVAL PT USE INHALER: CPT

## 2019-03-30 PROCEDURE — 83735 ASSAY OF MAGNESIUM: CPT

## 2019-03-30 RX ORDER — MAGNESIUM SULFATE IN WATER 40 MG/ML
4 INJECTION, SOLUTION INTRAVENOUS ONCE
Status: COMPLETED | OUTPATIENT
Start: 2019-03-30 | End: 2019-03-30

## 2019-03-30 RX ORDER — METOPROLOL TARTRATE 5 MG/5ML
5 INJECTION INTRAVENOUS ONCE
Status: COMPLETED | OUTPATIENT
Start: 2019-03-30 | End: 2019-03-30

## 2019-03-30 RX ORDER — SODIUM CHLORIDE 9 MG/ML
INJECTION, SOLUTION INTRAVENOUS CONTINUOUS
Status: DISCONTINUED | OUTPATIENT
Start: 2019-03-30 | End: 2019-04-01

## 2019-03-30 RX ORDER — METOPROLOL TARTRATE 5 MG/5ML
5 INJECTION INTRAVENOUS EVERY 4 HOURS PRN
Status: DISCONTINUED | OUTPATIENT
Start: 2019-03-30 | End: 2019-04-02 | Stop reason: HOSPADM

## 2019-03-30 RX ADMIN — FUROSEMIDE 20 MG: 10 INJECTION, SOLUTION INTRAMUSCULAR; INTRAVENOUS at 08:51

## 2019-03-30 RX ADMIN — ESCITALOPRAM OXALATE 10 MG: 10 TABLET ORAL at 08:51

## 2019-03-30 RX ADMIN — AMPICILLIN SODIUM AND SULBACTAM SODIUM 1.5 G: 1; .5 INJECTION, POWDER, FOR SOLUTION INTRAMUSCULAR; INTRAVENOUS at 04:11

## 2019-03-30 RX ADMIN — ASPIRIN 81 MG 81 MG: 81 TABLET ORAL at 08:50

## 2019-03-30 RX ADMIN — INSULIN LISPRO 2 UNITS: 100 INJECTION, SOLUTION INTRAVENOUS; SUBCUTANEOUS at 12:20

## 2019-03-30 RX ADMIN — AMPICILLIN SODIUM AND SULBACTAM SODIUM 1.5 G: 1; .5 INJECTION, POWDER, FOR SOLUTION INTRAMUSCULAR; INTRAVENOUS at 18:11

## 2019-03-30 RX ADMIN — SODIUM CHLORIDE: 9 INJECTION, SOLUTION INTRAVENOUS at 13:14

## 2019-03-30 RX ADMIN — METOPROLOL TARTRATE 5 MG: 5 INJECTION INTRAVENOUS at 17:54

## 2019-03-30 RX ADMIN — PANTOPRAZOLE SODIUM 40 MG: 40 TABLET, DELAYED RELEASE ORAL at 08:51

## 2019-03-30 RX ADMIN — ENOXAPARIN SODIUM 20 MG: 60 INJECTION SUBCUTANEOUS at 13:05

## 2019-03-30 RX ADMIN — POTASSIUM CHLORIDE 10 MEQ: 20 TABLET, EXTENDED RELEASE ORAL at 08:51

## 2019-03-30 RX ADMIN — MAGNESIUM SULFATE HEPTAHYDRATE 4 G: 40 INJECTION, SOLUTION INTRAVENOUS at 13:04

## 2019-03-30 RX ADMIN — COLCHICINE 0.6 MG: 0.6 TABLET, FILM COATED ORAL at 08:51

## 2019-03-30 RX ADMIN — Medication 10 ML: at 08:50

## 2019-03-30 RX ADMIN — AMPICILLIN SODIUM AND SULBACTAM SODIUM 1.5 G: 1; .5 INJECTION, POWDER, FOR SOLUTION INTRAMUSCULAR; INTRAVENOUS at 11:44

## 2019-03-30 RX ADMIN — AMPICILLIN SODIUM AND SULBACTAM SODIUM 1.5 G: 1; .5 INJECTION, POWDER, FOR SOLUTION INTRAMUSCULAR; INTRAVENOUS at 21:30

## 2019-03-30 RX ADMIN — INSULIN LISPRO 2 UNITS: 100 INJECTION, SOLUTION INTRAVENOUS; SUBCUTANEOUS at 08:49

## 2019-03-30 RX ADMIN — ENOXAPARIN SODIUM 40 MG: 40 INJECTION SUBCUTANEOUS at 08:48

## 2019-03-30 RX ADMIN — INSULIN LISPRO 1 UNITS: 100 INJECTION, SOLUTION INTRAVENOUS; SUBCUTANEOUS at 21:19

## 2019-03-30 RX ADMIN — ALLOPURINOL 100 MG: 100 TABLET ORAL at 08:50

## 2019-03-30 ASSESSMENT — PAIN SCALES - PAIN ASSESSMENT IN ADVANCED DEMENTIA (PAINAD)
NEGVOCALIZATION: 0
TOTALSCORE: 0
CONSOLABILITY: 0
BREATHING: 0
CONSOLABILITY: 0
FACIALEXPRESSION: 0
FACIALEXPRESSION: 0
CONSOLABILITY: 0
BREATHING: 0
TOTALSCORE: 0
NEGVOCALIZATION: 0
TOTALSCORE: 0
BREATHING: 0
BODYLANGUAGE: 0
NEGVOCALIZATION: 0
NEGVOCALIZATION: 0
CONSOLABILITY: 0
FACIALEXPRESSION: 0
BODYLANGUAGE: 0
NEGVOCALIZATION: 0
BREATHING: 0
FACIALEXPRESSION: 0
NEGVOCALIZATION: 0
BODYLANGUAGE: 0
BREATHING: 0
NEGVOCALIZATION: 0
NEGVOCALIZATION: 0
BODYLANGUAGE: 0
TOTALSCORE: 0
FACIALEXPRESSION: 0
CONSOLABILITY: 0
CONSOLABILITY: 0
TOTALSCORE: 0
FACIALEXPRESSION: 0
TOTALSCORE: 0
BODYLANGUAGE: 0
TOTALSCORE: 0
CONSOLABILITY: 0
BREATHING: 0
NEGVOCALIZATION: 0
BODYLANGUAGE: 0
FACIALEXPRESSION: 0
BREATHING: 0
BREATHING: 0
BODYLANGUAGE: 0
TOTALSCORE: 0
BODYLANGUAGE: 0
FACIALEXPRESSION: 0
FACIALEXPRESSION: 0
NEGVOCALIZATION: 0
FACIALEXPRESSION: 0
NEGVOCALIZATION: 0
BODYLANGUAGE: 0
BODYLANGUAGE: 0
TOTALSCORE: 0
CONSOLABILITY: 0
FACIALEXPRESSION: 0
BODYLANGUAGE: 0
BREATHING: 0
TOTALSCORE: 0
TOTALSCORE: 0
BREATHING: 0
BREATHING: 0

## 2019-03-30 ASSESSMENT — PAIN SCALES - GENERAL
PAINLEVEL_OUTOF10: 0
PAINLEVEL_OUTOF10: 0

## 2019-03-30 NOTE — CONSULTS
GASTROENTEROLOGY INPATIENT CONSULTATION      IDENTIFYING DATA/REASON FOR CONSULTATION   PATIENT:  Cb Ferrari  MRN:  [id number]  ADMIT DATE: 3/29/2019  TIME OF EVALUATION: 3/30/2019 3:20 PM  HOSPITAL STAY:   LOS: 1 day     REASON FOR CONSULTATION:  Esophageal stricture     HISTORY OF PRESENT ILLNESS   Cb Ferrari is a 80 y.o. female who has a past history notable for GERD, Esophageal Stricture, DM2, Osteoporosis who presents for SOB and concern for aspiration pneumonia. We have been consulted regarding esophageal stricture. Patient presents with dyspnea with concern for aspiration pneumonia. Patient is minimally interactive. She has a history of an esophageal stricture previously dilated in 12/18 by Dr. Murtaza Steen. Patient denies any further dysphagia prior to admission. Per chart review patient is maintained on a soft food diet as she has difficulty remembering to put in dentures. Daughter had reported PO intake and swallowing has been better.      PAST MEDICAL, SURGICAL, FAMILY, and SOCIAL HISTORY     Past Medical History:   Diagnosis Date    Cataract     Chronic midline low back pain without sciatica     Essential hypertension     GERD (gastroesophageal reflux disease)     Gouty arthropathy     History of breast cancer     Hypercholesterolemia     Incontinence     Lumbar spondylosis     Osteoporosis of multiple sites     Primary osteoarthritis involving multiple joints     Spinal stenosis, lumbar     Type 2 diabetes mellitus without complication, without long-term current use of insulin [facility])      Past Surgical History:   Procedure Laterality Date    CATARACT REMOVAL  jan 2012    dr Denise Younger left    CATARACT REMOVAL  Nov. 2012    dr Denise Younger  right    CHOLECYSTECTOMY      ESOPHAGEAL DILATATION N/A 12/6/2018    ESOPHAGEAL DILATION Desirae Euclid performed by Kelly Herring MD at [facility] N/A 12/13/2018    ESOPHAGEAL DILATION Desirae Euclid performed by Kelly Herring MD at [facility]    MASTECTOMY      bilat    UPPER GASTROINTESTINAL ENDOSCOPY      UPPER GASTROINTESTINAL ENDOSCOPY N/A 12/6/2018    EGD ESOPHAGOGASTRODUODENOSCOPY performed by Marty Griffith MD at [address]  12/13/2018    with [address] Lomas dil    UPPER GASTROINTESTINAL ENDOSCOPY N/A 12/13/2018    EGD DIAGNOSTIC ONLY performed by Marty [facility] MD at [facility]. No pertinent family history.   Social History     Socioeconomic History    Marital status:      Spouse name: None    Number of children: 3    Years of education: None    Highest education level: None   Occupational History    None   Social Needs    Financial resource strain: None    Food insecurity:     Worry: None     Inability: None    Transportation needs:     Medical: None     Non-medical: None   Tobacco Use    Smoking status: Never Smoker    Smokeless tobacco: Never Used   Substance and Sexual Activity    Alcohol use: No     Alcohol/week: 0.0 oz    Drug use: No    Sexual activity: None   Lifestyle    Physical activity:     Days per week: None     Minutes per session: None    Stress: None   Relationships    Social connections:     Talks on phone: None     Gets together: None     Attends Restorationism service: None     Active member of club or organization: None     Attends meetings of clubs or organizations: None     Relationship status: None    Intimate partner violence:     Fear of current or ex partner: None     Emotionally abused: None     Physically abused: None     Forced sexual activity: None   Other Topics Concern    None   Social History Narrative    Grew up in [place]    3 daughters, lives with  (who is seen here) and her daughter Salvatore Pro    7 grandkids, most in town       MEDICATIONS   SCHEDULED:    magnesium sulfate 4 g Once   [START ON 3/31/2019] enoxaparin 1 mg/kg Daily   allopurinol 100 mg Daily   aspirin 81 mg Every Other Day   colchicine 0.6 mg Daily   escitalopram 10 mg Daily   pantoprazole 40 mg Daily   potassium chloride 10 mEq Every Other Day   sodium chloride flush 10 mL 2 times per day   ampicillin-sulbactam 1.5 g Q6H   insulin lispro 0-12 Units TID WC   insulin lispro 0-6 Units Nightly     FLUIDS/DRIPS:     sodium chloride 75 mL/hr at 03/30/19 1314    dextrose       PRNs:   sodium chloride flush 10 mL PRN   magnesium hydroxide 30 mL Daily PRN   ondansetron 4 mg Q6H PRN   glucose 15 g PRN   dextrose 12.5 g PRN   glucagon (rDNA) 1 mg PRN   dextrose 100 mL/hr PRN     ALLERGIES:  She [unfilled]    REVIEW OF SYSTEMS   A full 12 pt ROS is negative other than noted in HPI    PHYSICAL EXAM   [unfilled]   I/O last 3 completed shifts:   In: 1330 [P.O.:480; IV Piggyback:850]  Out: 750 [Urine:750]  Oxygen Therapy:  @[id number])@  @[id number])@  @[id number])@    Physical Exam:  Gen: Resting in bed, NAD   CV: RRR no MRG   Pul: Bilateral Rhonchi   Abd: Good bowel sounds throughout, no scars, soft, NT/ND, no masses, no HSM   Ext: No edema   Neuro: No asterixis   Skin: No jaundice, spider angiomas, anguiano erythema    LABS AND IMAGING     Recent Results (from the past 24 hour(s))   EKG 12 Lead    Collection Time: 03/29/19  4:44 PM   Result Value Ref Range    Ventricular Rate 103 BPM    Atrial Rate 103 BPM    P-R Interval 202 ms    QRS Duration 84 ms    Q-T Interval 306 ms    QTc Calculation (Bazett) 400 ms    P Axis 88 degrees    R Axis -22 degrees    T Axis -33 degrees    Diagnosis       Sinus tachycardiaInferior infarct , age undeterminedAnterior infarct (cited on or before 06-DEC-2018)Abnormal ECGWhen compared with ECG of 06-DEC-2018 09:45,PPrior ECG was sinus with PAC's, PAC's no longer presentNonspecific T wave abnormality, worse in Anterolateral leadsQT has shortenedConfirmed by LYNDSAY Garcia MD [id number]) on 3/30/2019 8:18:43 AM   Lactic Acid, Plasma    Collection Time: 03/29/19  4:55 PM   Result Value Ref Range Lactic Acid 7.3 (HH) 0.4 - 2.0 mmol/L   Procalcitonin    Collection Time: 03/29/19  4:55 PM   Result Value Ref Range    Procalcitonin 1.88 (H) 0.00 - 0.15 ng/mL   TSH without Reflex    Collection Time: 03/29/19  7:02 PM   Result Value Ref Range    TSH 0.85 0.27 - 4.20 uIU/mL   Troponin    Collection Time: 03/29/19  7:02 PM   Result Value Ref Range    Troponin 0.01 <0.01 ng/mL   POCT Glucose    Collection Time: 03/29/19  8:52 PM   Result Value Ref Range    POC Glucose 390 (H) 70 - 99 mg/dl    Performed on ACCU-CHEK    Troponin    Collection Time: 03/29/19 10:09 PM   Result Value Ref Range    Troponin <0.01 <0.01 ng/mL   Lactic Acid, Plasma    Collection Time: 03/29/19 11:43 PM   Result Value Ref Range    Lactic Acid 5.2 (HH) 0.4 - 2.0 mmol/L   POCT Glucose    Collection Time: 03/29/19 11:52 PM   Result Value Ref Range    POC Glucose 289 (H) 70 - 99 mg/dl    Performed on ACCU-CHEK    Basic Metabolic Panel    Collection Time: 03/30/19  5:11 AM   Result Value Ref Range    Sodium 138 136 - 145 mmol/L    Potassium 3.7 3.5 - 5.1 mmol/L    Chloride 100 99 - 110 mmol/L    CO2 24 21 - 32 mmol/L    Anion Gap 14 3 - 16    Glucose 133 (H) 70 - 99 mg/dL    BUN 26 (H) 7 - 20 mg/dL    CREATININE 1.6 (H) 0.6 - 1.2 mg/dL    GFR Non-African American 30 (A) >60    GFR  37 (A) >60    Calcium 9.1 8.3 - 10.6 mg/dL   Magnesium    Collection Time: 03/30/19  5:11 AM   Result Value Ref Range    Magnesium 1.40 (L) 1.80 - 2.40 mg/dL   Lipid panel - fasting    Collection Time: 03/30/19  5:11 AM   Result Value Ref Range    Cholesterol, Total 112 0 - 199 mg/dL    Triglycerides 83 0 - 150 mg/dL    HDL 48 40 - 60 mg/dL    LDL Calculated 47 <100 mg/dL    VLDL Cholesterol Calculated 17 Not Established mg/dL   CBC auto differential    Collection Time: 03/30/19  5:11 AM   Result Value Ref Range    WBC 16.1 (H) 4.0 - 11.0 K/uL    RBC 4.80 4.00 - 5.20 M/uL    Hemoglobin 12.7 12.0 - 16.0 g/dL    Hematocrit 40.5 36.0 - 48.0 %    MCV 84.5 80.0 - 100.0 fL    MCH 26.5 26.0 - 34.0 pg    MCHC 31.3 31.0 - 36.0 g/dL    RDW 18.6 (H) 12.4 - 15.4 %    Platelets 386 949 - 574 K/uL    MPV 9.4 5.0 - 10.5 fL    Neutrophils % 83.5 %    Lymphocytes % 9.2 %    Monocytes % 7.2 %    Eosinophils % 0.0 %    Basophils % 0.1 %    Neutrophils # 13.4 (H) 1.7 - 7.7 K/uL    Lymphocytes # 1.5 1.0 - 5.1 K/uL    Monocytes # 1.2 0.0 - 1.3 K/uL    Eosinophils # 0.0 0.0 - 0.6 K/uL    Basophils # 0.0 0.0 - 0.2 K/uL   Lactic Acid, Plasma    Collection Time: 03/30/19  5:11 AM   Result Value Ref Range    Lactic Acid 1.9 0.4 - 2.0 mmol/L   POCT Glucose    Collection Time: 03/30/19  7:41 AM   Result Value Ref Range    POC Glucose 140 (H) 70 - 99 mg/dl    Performed on ACCU-CHEK    POCT Glucose    Collection Time: 03/30/19 11:35 AM   Result Value Ref Range    POC Glucose 150 (H) 70 - 99 mg/dl    Performed on ACCU-CHEK    Lactic Acid, Plasma    Collection Time: 03/30/19 11:43 AM   Result Value Ref Range    Lactic Acid 1.9 0.4 - 2.0 mmol/L     Other Labs      Imaging      ASSESSMENT AND RECOMMENDATIONS   Pineda Cervantes is a 80 y.o. female who has a past history notable for GERD, Esophageal Stricture, DM2, Osteoporosis who presents for SOB and concern for aspiration pneumonia. We have been consulted regarding esophageal stricture. IMPRESSION:    1. Dyspnea   2. Pneumonia with concern for Aspiration Pneumonia   3. Esophageal stricture    RECOMMENDATIONS:    -Aspiration-Patient has a history of an esophageal stricture previously dilated by Dr. Foreign Altman to 40 Algerian with tearing noted in the UES. Primary team has ordered an MBS to evaluate for oropharyngeal dysphagia and I agree this is more likely be contributing to aspiration than the esophageal stricture. Esophageal stricture was not severe based on endoscopic description. Patient denies any dysphagia but her history is unreliable. Await results of MBS/Speech and swallow evaluation.      If you have any questions or need any further information, please feel free to contact our consult team.  Thank you for allowing us to participate in the care of [facility].     Lit Reed MD  [address]

## 2019-03-30 NOTE — PROGRESS NOTES
Clinical Pharmacy Note  Renal Dose Adjustment    Krista Tompkins is receiving Enoxaparin. This medication is renally eliminated. Based on the patient's Estimated Creatinine Clearance: 20 mL/min (A) (based on SCr of 1.6 mg/dL (H)). and urine output, the dose has been adjusted to 60mg once daily per protocol. Pharmacy will continue to monitor and adjust dose as needed for changes in renal function.      Yen Blackwell [facility]  3/30/2019  12:29 PM

## 2019-03-30 NOTE — PROGRESS NOTES
Spoke with Dr. Sara Hinton after he saw patient. He stated he would put in orders for IV lopressor. Dr. Eyal Rolon notified of Dr. Sarwat Gotti for beta blocker, he stated ok to do beta blocker first, if HR remains >120 3-4 hrs after BB, then start dilt as ordered. HR is currently 87 A. Flutter.

## 2019-03-30 NOTE — CONSULTS
Nutrition Assessment    Type and Reason for Visit: Initial, Positive Nutrition Screen, Consult(heart failure ed, lili score, need for soft foods)    Nutrition Recommendations:   Encouraged family to contact Dietitian should any questions arise regarding diet    Nutrition Assessment: Pt with improved status but remains at risk for nutrition compromise r/t increased needs, Dementia, cardiac & endocrine dysfunction, altered GI r/t infection, hx of multiple esophageal dilations requiring texture to be softer, risk for delayed healing & nutrient deficit, risk for pt to not sense hunger cues. Diet advanced to 2 gm Na / 1500 ml per day. Daughter reported that intake better this date. Soft texture still needed as pt does not remember how to put dentures in per feedback. Will refer to call center to offer soft foods unless otherwise ordered. Pt with new dx of CHF. Daughter reported that diet for CHF already on board at home as pt's  has been following the diet for some time. Feedback revealed knowledge of principles, including fluid requirements & need for daily wts. Malnutrition Assessment:  · Malnutrition Status: No malnutrition    Nutrition Risk Level: Moderate    Nutrient Needs:  · Estimated Daily Total Kcal: 6216-9511 (25-30 x ABW of 65 kg)  · Estimated Daily Protein (g): 78 (1.2 x ABW)  · Estimated Daily Total Fluid (ml/day): 1800 ml per MD orders    Nutrition Diagnosis:   · Problem: Biting/chewing difficulty  · Etiology: related to Cognitive or neurological impairment, Alteration in GI function     Signs and symptoms:  as evidenced by (reports of need for soft foods as well as pt's inability to remember how to put dentures in)    Objective Information:  · Nutrition-Focused Physical Findings: BM 3/30. Noted nonpitting edema to lower extremities.   · Wound Type: (red heels & coccyx)  · Current Nutrition Therapies:  · Oral Diet Orders: 2gm Sodium, Fluid Restriction   · Oral Diet intake: 26-50%, 51-75%(improving per daughter)  · Anthropometric Measures:  · Ht: 5' 2\" (157.5 cm)   · Current Body Wt: 133 lb (60.3 kg)  · Admission Body Wt: 131 lb (59.4 kg)  · Usual Body Wt: 133 lb (60.3 kg)(per daughter)  · Ideal Body Wt: 110 lb (49.9 kg), % Ideal Body    · BMI Classification: BMI 18.5 - 24.9 Normal Weight    Nutrition Interventions:   Continue current diet(refer to call center to offer soft foods unless ordered otherwise)  Continued Inpatient Monitoring, Education declined(Daughter agreed to accept more information on diet therapy for CHF.)    Nutrition Evaluation:   · Evaluation: Goals set   · Goals: tolerate most appropriate form of nutrition    · Monitoring: Meal Intake, Diet Tolerance, Skin Integrity, Mental Status/Confusion, Weight, Pertinent Labs, Nausea or Vomiting, Diarrhea, Constipation      Electronically signed by Maricel Freitas RD, LD on 3/30/19 at 12:23 PM    Contact Number: [phone]

## 2019-03-30 NOTE — PROGRESS NOTES
Hospitalist Progress Note      PCP: Nadya Wheeler MD    Date of Admission: 3/29/2019    Chief Complaint: SOB, cough. Subjective:     Hx of esophageal stricture s/p dilation in Dec.     Had emesis, then SOb and cough prior to admission. Suspect aspiration PNA secondary to dysphagia. Medications:  Reviewed    Infusion Medications    sodium chloride 75 mL/hr at 03/30/19 1314    diltiazem      dextrose       Scheduled Medications    magnesium sulfate  4 g Intravenous Once    [START ON 3/31/2019] enoxaparin  1 mg/kg Subcutaneous Daily    allopurinol  100 mg Oral Daily    aspirin  81 mg Oral Every Other Day    colchicine  0.6 mg Oral Daily    escitalopram  10 mg Oral Daily    pantoprazole  40 mg Oral Daily    potassium chloride  10 mEq Oral Every Other Day    sodium chloride flush  10 mL Intravenous 2 times per day    ampicillin-sulbactam  1.5 g Intravenous Q6H    insulin lispro  0-12 Units Subcutaneous TID WC    insulin lispro  0-6 Units Subcutaneous Nightly     PRN Meds: sodium chloride flush, magnesium hydroxide, ondansetron, glucose, dextrose, glucagon (rDNA), dextrose      Intake/Output Summary (Last 24 hours) at 3/30/2019 1626  Last data filed at 3/30/2019 1201  Gross per 24 hour   Intake 580 ml   Output 750 ml   Net -170 ml       Physical Exam Performed:    /66   Pulse 115   Temp 98.2 °F (36.8 °C) (Oral)   Resp 18   Ht 5' 2\" (1.575 m)   Wt 132 lb 15 oz (60.3 kg)   SpO2 96%   BMI 24.31 kg/m²     General appearance: weak and ill appearing. HEENT: Pupils equal, round, and reactive to light. Conjunctivae/corneas clear. Neck: Supple, with full range of motion. No jugular venous distention. Trachea midline. Respiratory:  Normal respiratory effort. Clear to auscultation, bilaterally without Rales/Wheezes/Rhonchi. Cardiovascular: Regular rate and rhythm with normal S1/S2 without murmurs, rubs or gallops.   Abdomen: Soft, non-tender, non-distended with normal bowel sounds. Musculoskeletal: No clubbing, cyanosis, trace edema bilaterally. Full range of motion without deformity. Skin: Skin color, texture, turgor normal.  No rashes or lesions. Neurologic:  Neurovascularly intact without any focal sensory/motor deficits. Cranial nerves: II-XII intact, grossly non-focal.  Psychiatric: Alert and oriented, baseline dementia. Capillary Refill: Brisk,< 3 seconds   Peripheral Pulses: +2 palpable, equal bilaterally       Labs:   Recent Labs     03/29/19  1253 03/30/19  0511   WBC 17.8* 16.1*   HGB 15.8 12.7   HCT 50.4* 40.5    249     Recent Labs     03/29/19  1253 03/30/19  0511    138   K 4.5 3.7    100   CO2 16* 24   BUN 25* 26*   CREATININE 1.4* 1.6*   CALCIUM 10.2 9.1     Recent Labs     03/29/19  1253   AST 23   ALT 7*   BILITOT 1.4*   ALKPHOS 121     No results for input(s): INR in the last 72 hours. Recent Labs     03/29/19  1902 03/29/19  2209   TROPONINI 0.01 <0.01       Urinalysis:      Lab Results   Component Value Date    NITRU Negative 03/29/2019    WBCUA 3 03/29/2019    BACTERIA 4+ 03/29/2019    RBCUA 2 03/29/2019    BLOODU SMALL 03/29/2019    SPECGRAV 1.021 03/29/2019    GLUCOSEU Negative 03/29/2019       Radiology:  CT CHEST WO CONTRAST   Final Result   Bilateral airspace disease. Findings likely represent a combination of mild   edema and pneumonia. Some of these areas are somewhat nodular in appearance. Follow-up to resolution recommended to exclude underlying lesion. There is mucous plugging within the left lower lobe airways. Large hiatal hernia. XR CHEST PORTABLE   Final Result   Cardiomegaly with mild pulmonary vascular congestion.   No other significant   findings in the chest.         FL ESOPHAGRAM    (Results Pending)   Fluoroscopy modified barium swallow with video    (Results Pending)           Assessment/Plan:    Active Hospital Problems    Diagnosis Date Noted    CAP (community acquired pneumonia) due to Chlamydia species [J16.0] 03/29/2019    CHF (congestive heart failure), NYHA class I, acute on chronic, combined ([facility] [I50.43] 03/29/2019    Sepsis ([facility] [A41.9] 03/29/2019       Admitted for acute hypoxic respiratory failure secondary to possible aspiration pneumonia  Due to dysphagia with esophageal strictures and emesis. Cont Unasyn IV. Sepsis POA - due to above. Lactic Acidosis - suspect due to above and also hypxia. Resolving 8 down to 1.9. DEVON -   Stop lasix. Gentle IVF till able to tolerate PO. Hypomagnesemia - replace IV. Afib RVR -   Went into afib last night,   Daughter reports no prior Hx. EKG in Dec with Afib. Plan:    - Repeat EKG   - start lovenox   - start dilt gtt. - treat underlying cause. - cardiology EP eval.         History of esophageal strictures requiring dilatation in December 2018 by Dr. Brianna Lima. Chronic stable gout on allopurinol and colchicine. No evidence of joint pain at this time. History of CAD, no chest pain at this time, continue aspirin. DVT Prophylaxis: lovenox based on age and renal function.    Diet: DIET CLEAR LIQUID; Low Sodium (2 GM)  Code Status: Full Code      Dispo - PCU    Angel Cabezas MD

## 2019-03-30 NOTE — PROGRESS NOTES
Call received from [address] [place] [address] that pts HR was up to the 140s. Pt did not appear in any distress. HR down to 120s on assessment. Pt is in A.fib, which she converted to yesterday. Dr. Clara Heller notified. Awaiting response.

## 2019-03-30 NOTE — CONSULTS
 CHOLECYSTECTOMY      ESOPHAGEAL DILATATION N/A 12/6/2018    ESOPHAGEAL DILATION GONZALEZ performed by Rylan Shields MD at [address] 12/13/2018    ESOPHAGEAL DILATION Sharon Vidal performed by Rylan Shields MD at [facility] ENDOSCOPY      UPPER GASTROINTESTINAL ENDOSCOPY N/A 12/6/2018    EGD ESOPHAGOGASTRODUODENOSCOPY performed by Rylan Shields MD at [address] [facility] [facility]  12/13/2018    with [address] dil    UPPER GASTROINTESTINAL ENDOSCOPY N/A 12/13/2018    EGD DIAGNOSTIC ONLY performed by Rylan Shields MD at [facility]. No pertinent family history.   Social History     Tobacco Use    Smoking status: Never Smoker    Smokeless tobacco: Never Used   Substance Use Topics    Alcohol use: No     Alcohol/week: 0.0 oz    Drug use: No       No Known Allergies  Current Facility-Administered Medications   Medication Dose Route Frequency Provider Last Rate Last Dose    magnesium sulfate 4 g in 100 mL IVPB premix  4 g Intravenous Once Deven Dumont MD 25 mL/hr at 03/30/19 1304 4 g at 03/30/19 1304    [START ON 3/31/2019] enoxaparin (LOVENOX) injection 60 mg  1 mg/kg Subcutaneous Daily Deven Dumont MD        0.9 % sodium chloride infusion   Intravenous Continuous Deven Dumont MD 75 mL/hr at 03/30/19 1314      allopurinol (ZYLOPRIM) tablet 100 mg  100 mg Oral Daily Bobby Martin MD   100 mg at 03/30/19 0850    aspirin chewable tablet 81 mg  81 mg Oral Every Other Day Bobby Melgoza MD   81 mg at 03/30/19 0850    colchicine (COLCRYS) tablet 0.6 mg  0.6 mg Oral Daily Bobby Martin MD   0.6 mg at 03/30/19 0851    escitalopram (LEXAPRO) tablet 10 mg  10 mg Oral Daily Bobby Martin MD   10 mg at 03/30/19 0851    pantoprazole (PROTONIX) tablet 40 mg  40 mg Oral Daily Bobby Melgoza MD   40 mg at 03/30/19 8843  potassium chloride (KLOR-CON M) extended release tablet 10 mEq  10 mEq Oral Every Other Day Bobby Yuen MD   10 mEq at 03/30/19 0851    sodium chloride flush 0.9 % injection 10 mL  10 mL Intravenous 2 times per day Bobby Yuen MD   10 mL at 03/30/19 0850    sodium chloride flush 0.9 % injection 10 mL  10 mL Intravenous PRN Bobby Yuen MD        magnesium hydroxide (MILK OF MAGNESIA) 400 MG/5ML suspension 30 mL  30 mL Oral Daily PRN Bobby Yuen MD        ondansetron (ZOFRAN) injection 4 mg  4 mg Intravenous Q6H PRN Bobby Yuen MD        ampicillin-sulbactam (UNASYN) 1.5 g IVPB minibag  1.5 g Intravenous Q6H Bobby Lauren Yuen MD   Stopped at 03/30/19 1214    glucose (GLUTOSE) 40 % oral gel 15 g  15 g Oral PRN Damon Pradhan PA-C        dextrose 50 % solution 12.5 g  12.5 g Intravenous PRN Damon rPadhan PA-C        glucagon (rDNA) injection 1 mg  1 mg Intramuscular PRN Damon Pradhan PA-C        dextrose 5 % solution  100 mL/hr Intravenous PRN Damon Pradhan PA-C        insulin lispro (HUMALOG) injection vial 0-12 Units  0-12 Units Subcutaneous TID WC Damon Pradhan PA-C   2 Units at 03/30/19 1220    insulin lispro (HUMALOG) injection vial 0-6 Units  0-6 Units Subcutaneous Nightly Damon Pradhan PA-C   3 Units at 03/29/19 2356       Physical Exam:   /66   Pulse 115   Temp 98.2 °F (36.8 °C) (Oral)   Resp 18   Ht 5' 2\" (1.575 m)   Wt 132 lb 15 oz (60.3 kg)   SpO2 96%   BMI 24.31 kg/m²     Intake/Output Summary (Last 24 hours) at 3/30/2019 1607  Last data filed at 3/30/2019 1201  Gross per 24 hour   Intake 580 ml   Output 750 ml   Net -170 ml     Wt Readings from Last 2 Encounters:   03/30/19 132 lb 15 oz (60.3 kg)   02/26/19 130 lb (59 kg)     Constitutional: She is oriented to person, place, and time. She appears well-developed and well-nourished. In no acute distress. Head: Normocephalic and atraumatic.      Neck: Neck supple. No JVD present. Carotid bruit is not present. No mass and no thyromegaly present. No lymphadenopathy present. Cardiovascular: Irreg irreg and tachy, normal heart sounds and intact distal pulses. Exam reveals no gallop and no friction rub. No murmur heard. Pulmonary/Chest: Effort normal and breath sounds normal. No respiratory distress. She has no wheezes, rhonchi or rales. Abdominal: Soft, non-tender. Bowel sounds and aorta are normal. She exhibits no organomegaly, mass or bruit. Extremities: No edema, cyanosis, or clubbing. Pulses are 2+ radial/carotid/dorsalis pedis and posterior tibial bilaterally. Neurological: She is alert and oriented to person, place, and time. She has normal reflexes. No cranial nerve deficit. Coordination normal.   Skin: Skin is warm and dry. There is no rash or diaphoresis. Psychiatric: She has a normal mood and affect. Her speech is normal and behavior is normal.     EKG Interpretation: Sinus rhythm    Lab Review:   Lab Results   Component Value Date    TRIG 83 03/30/2019    HDL 48 03/30/2019    LDLCALC 47 03/30/2019    LDLDIRECT 149 11/16/2017    LABVLDL 17 03/30/2019     Lab Results   Component Value Date     03/30/2019    K 3.7 03/30/2019    K 3.8 12/06/2018    BUN 26 03/30/2019    CREATININE 1.6 03/30/2019     Recent Labs     03/29/19  1253 03/30/19  0511   WBC 17.8* 16.1*   HGB 15.8 12.7   HCT 50.4* 40.5    249     Echo 2/1/19:  Normal LV size and systolic function. Estimated ejection fraction is 60%.  Mild mitral regurgitation is present.   The left atrium is moderately dilated.   Normal right ventricular size and function.   Mild pulmonic regurgitation present. Assessment:  1. Atrial flutter with variable AV response  2. Aspiration Pneumonia  3. Dementia, unspecified without behavioral disturbance    Plan:  Kayleigh Tim appears to be in atrial flutter with variable conduction. I think she willl convert back to sinus rhythm if we slow her rate down. Given her potential aspiration I would use IV Lopressor with 5mg once now. We can use 5mg every 4 hours as needed for a heart rate >100bpm. She is anticoagulated with Lovenox right now. If she is nonambulatory and not a fall risk she could be anticoagulated with a DOAC such as Xarelto 15mg daily. I would defer this to a Hospitalist discussion with the family. We will follow peripherally. Please call with any questions.

## 2019-03-31 LAB
ANION GAP SERPL CALCULATED.3IONS-SCNC: 11 MMOL/L (ref 3–16)
BUN BLDV-MCNC: 38 MG/DL (ref 7–20)
CALCIUM SERPL-MCNC: 8.8 MG/DL (ref 8.3–10.6)
CHLORIDE BLD-SCNC: 104 MMOL/L (ref 99–110)
CO2: 24 MMOL/L (ref 21–32)
CREAT SERPL-MCNC: 1.3 MG/DL (ref 0.6–1.2)
EKG ATRIAL RATE: 96 BPM
EKG DIAGNOSIS: NORMAL
EKG Q-T INTERVAL: 306 MS
EKG QRS DURATION: 92 MS
EKG QTC CALCULATION (BAZETT): 496 MS
EKG R AXIS: -20 DEGREES
EKG T AXIS: 82 DEGREES
EKG VENTRICULAR RATE: 158 BPM
GFR AFRICAN AMERICAN: 47
GFR NON-AFRICAN AMERICAN: 39
GLUCOSE BLD-MCNC: 115 MG/DL (ref 70–99)
GLUCOSE BLD-MCNC: 121 MG/DL (ref 70–99)
GLUCOSE BLD-MCNC: 122 MG/DL (ref 70–99)
GLUCOSE BLD-MCNC: 132 MG/DL (ref 70–99)
GLUCOSE BLD-MCNC: 135 MG/DL (ref 70–99)
GLUCOSE BLD-MCNC: 169 MG/DL (ref 70–99)
LACTIC ACID: 1.5 MMOL/L (ref 0.4–2)
LACTIC ACID: 1.6 MMOL/L (ref 0.4–2)
MAGNESIUM: 2.5 MG/DL (ref 1.8–2.4)
PERFORMED ON: ABNORMAL
POTASSIUM SERPL-SCNC: 3.7 MMOL/L (ref 3.5–5.1)
PRO-BNP: ABNORMAL PG/ML (ref 0–449)
PROCALCITONIN: 2.07 NG/ML (ref 0–0.15)
SODIUM BLD-SCNC: 139 MMOL/L (ref 136–145)

## 2019-03-31 PROCEDURE — 6360000002 HC RX W HCPCS: Performed by: INTERNAL MEDICINE

## 2019-03-31 PROCEDURE — 97127 HC SP THER IVNTJ W/FOCUS COG FUNCJ: CPT

## 2019-03-31 PROCEDURE — 94761 N-INVAS EAR/PLS OXIMETRY MLT: CPT

## 2019-03-31 PROCEDURE — 6370000000 HC RX 637 (ALT 250 FOR IP): Performed by: INTERNAL MEDICINE

## 2019-03-31 PROCEDURE — 83880 ASSAY OF NATRIURETIC PEPTIDE: CPT

## 2019-03-31 PROCEDURE — 83605 ASSAY OF LACTIC ACID: CPT

## 2019-03-31 PROCEDURE — 36415 COLL VENOUS BLD VENIPUNCTURE: CPT

## 2019-03-31 PROCEDURE — 2060000000 HC ICU INTERMEDIATE R&B

## 2019-03-31 PROCEDURE — 80048 BASIC METABOLIC PNL TOTAL CA: CPT

## 2019-03-31 PROCEDURE — 99233 SBSQ HOSP IP/OBS HIGH 50: CPT | Performed by: INTERNAL MEDICINE

## 2019-03-31 PROCEDURE — 93010 ELECTROCARDIOGRAM REPORT: CPT | Performed by: INTERNAL MEDICINE

## 2019-03-31 PROCEDURE — 83735 ASSAY OF MAGNESIUM: CPT

## 2019-03-31 PROCEDURE — 84145 PROCALCITONIN (PCT): CPT

## 2019-03-31 PROCEDURE — 2580000003 HC RX 258: Performed by: INTERNAL MEDICINE

## 2019-03-31 PROCEDURE — 92610 EVALUATE SWALLOWING FUNCTION: CPT

## 2019-03-31 PROCEDURE — 2700000000 HC OXYGEN THERAPY PER DAY

## 2019-03-31 PROCEDURE — 6370000000 HC RX 637 (ALT 250 FOR IP): Performed by: PHYSICIAN ASSISTANT

## 2019-03-31 RX ORDER — FUROSEMIDE 20 MG/1
20 TABLET ORAL DAILY
Status: DISCONTINUED | OUTPATIENT
Start: 2019-03-31 | End: 2019-04-02

## 2019-03-31 RX ORDER — METOPROLOL SUCCINATE 25 MG/1
25 TABLET, EXTENDED RELEASE ORAL DAILY
Status: DISCONTINUED | OUTPATIENT
Start: 2019-03-31 | End: 2019-04-02 | Stop reason: HOSPADM

## 2019-03-31 RX ADMIN — SODIUM CHLORIDE: 9 INJECTION, SOLUTION INTRAVENOUS at 06:33

## 2019-03-31 RX ADMIN — AMPICILLIN SODIUM AND SULBACTAM SODIUM 1.5 G: 1; .5 INJECTION, POWDER, FOR SOLUTION INTRAMUSCULAR; INTRAVENOUS at 16:44

## 2019-03-31 RX ADMIN — ESCITALOPRAM OXALATE 10 MG: 10 TABLET ORAL at 08:56

## 2019-03-31 RX ADMIN — AMPICILLIN SODIUM AND SULBACTAM SODIUM 1.5 G: 1; .5 INJECTION, POWDER, FOR SOLUTION INTRAMUSCULAR; INTRAVENOUS at 21:58

## 2019-03-31 RX ADMIN — ASPIRIN 81 MG 81 MG: 81 TABLET ORAL at 08:56

## 2019-03-31 RX ADMIN — RIVAROXABAN 15 MG: 15 TABLET, FILM COATED ORAL at 17:46

## 2019-03-31 RX ADMIN — METOPROLOL SUCCINATE 25 MG: 25 TABLET, EXTENDED RELEASE ORAL at 16:44

## 2019-03-31 RX ADMIN — INSULIN LISPRO 2 UNITS: 100 INJECTION, SOLUTION INTRAVENOUS; SUBCUTANEOUS at 17:46

## 2019-03-31 RX ADMIN — POTASSIUM CHLORIDE 10 MEQ: 20 TABLET, EXTENDED RELEASE ORAL at 08:55

## 2019-03-31 RX ADMIN — ENOXAPARIN SODIUM 60 MG: 60 INJECTION SUBCUTANEOUS at 08:56

## 2019-03-31 RX ADMIN — AMPICILLIN SODIUM AND SULBACTAM SODIUM 1.5 G: 1; .5 INJECTION, POWDER, FOR SOLUTION INTRAMUSCULAR; INTRAVENOUS at 05:02

## 2019-03-31 RX ADMIN — FUROSEMIDE 20 MG: 20 TABLET ORAL at 16:44

## 2019-03-31 RX ADMIN — Medication 10 ML: at 21:20

## 2019-03-31 RX ADMIN — AMPICILLIN SODIUM AND SULBACTAM SODIUM 1.5 G: 1; .5 INJECTION, POWDER, FOR SOLUTION INTRAMUSCULAR; INTRAVENOUS at 09:54

## 2019-03-31 RX ADMIN — COLCHICINE 0.6 MG: 0.6 TABLET, FILM COATED ORAL at 08:56

## 2019-03-31 RX ADMIN — ALLOPURINOL 100 MG: 100 TABLET ORAL at 08:56

## 2019-03-31 RX ADMIN — PANTOPRAZOLE SODIUM 40 MG: 40 TABLET, DELAYED RELEASE ORAL at 08:56

## 2019-03-31 ASSESSMENT — PAIN SCALES - GENERAL
PAINLEVEL_OUTOF10: 0

## 2019-03-31 NOTE — PROGRESS NOTES
Speech Language Pathology  Facility/Department: [facility] SWALLOW EVALUATION    NAME: Ronel Rivera  : 1929  MRN: [id number]    ADMISSION DATE: 3/29/2019  ADMITTING DIAGNOSIS: has Primary osteoarthritis involving multiple joints; History of breast cancer; Chronic midline low back pain without sciatica; Essential hypertension; Cataract; Lumbar spondylosis; Spinal stenosis, lumbar; Gouty arthropathy; Hypercholesterolemia; CKD (chronic kidney disease) stage 3, GFR 30-59 ml/min ([address] Folate deficiency; Vitamin B12 deficiency; Dementia without behavioral disturbance; Esophageal foreign body; Prediabetes; CAP (community acquired pneumonia) due to Chlamydia species; CHF (congestive heart failure), NYHA class I, acute on chronic, combined ([address] Sepsis ([address] Typical atrial flutter ([address] Septicemia ([address] and Aspiration pneumonia ([address] on their problem list.  ONSET DATE: 3/29/2019    Recent Chest Xray/CT of Chest: 3/29/2019:     Impression   Bilateral airspace disease.  Findings likely represent a combination of mild   edema and pneumonia.  Some of these areas are somewhat nodular in appearance. Follow-up to resolution recommended to exclude underlying lesion.       There is mucous plugging within the left lower lobe airways.       Large hiatal hernia.             Date of Eval: 3/31/2019  Evaluating Therapist: Froylan Simmons    Current Diet level:  Current Diet : NPO  Current Liquid Diet : Full      Primary Complaint  Patient Complaint: Unable to formulate a complaint.     Pain:  Pain Assessment  Patient Currently in Pain: Denies  Pain Assessment: 0-10  Pain Level: 0  Patient's Stated Pain Goal: No pain  PAINAD (Pain Assessment in Advance Dementia)  Breathing: normal  Negative Vocalization: none  Facial Expression: smiling or inexpressive  Body Language: relaxed  Consolability: no need to console  PAINAD Score: 0    Reason for Referral  Ronel Rivera was referred for a week  Long-term Goals  Timeframe for Long-term Goals: One-two week  Goal 1: Pt will safely tolerate the least reastrictive diet/liquid level. Goal 2: Pt/caregiver will demonstrate safe swallowing strategies with independence. Dysphagia Goals: The patient will tolerate instrumental swallowing procedure; The patient/caregiver will demonstrate understanding of compensatory strategies for improved swallowing safety. ;The patient will tolerate honey-thick liquids without signs and symptoms of aspiration 10/10 via cup. ;The patient will tolerate puree foods 10/10. General  Chart Reviewed: Yes  Subjective  Subjective: Pt was up in the chair. She was unable to communicate or follow directions 2/2 dementia. Behavior/Cognition: Alert; Cooperative;Pleasant mood;Confused; Doesn't follow directions  Temperature Spikes Noted: No  Respiratory Status: O2 via nasual cannula  Breath Sounds: Wet  O2 Device: Nasal cannula  Communication Observation: Non-verbal  Follows Directions: None  Dentition: Edentulous  Patient Positioning: Upright in chair  Prior Dysphagia History: The pt has had 2 prior MBS's and both showed a functional swallowing mechanism. Pt was subsequently put on a regular diet with thin liquids. Consistencies Administered: Puree; Thin - teaspoon; Thin - straw;Honey - straw    Oral Motor Deficits  Oral/Motor  Oral Motor: (Pt was unable to follow directions for an OM exam, however, her OM musculature appeared grossly intact.)    Oral Phase Dysfunction  Oral Phase  Oral Phase: Exceptions  Oral Phase Dysfunction  Decreased Anterior to Posterior Transit: All  Suspected Premature Bolus Loss: All  Oral Phase  Oral Phase - Comment: Pt required verbal cues to swallow many of her trials. The pt had a tendency to hold material in her oral cavity and it is suspected that the thinner liquids spilled over the base of her tongue before a swallow reflex could be initiated.      Indicators of Pharyngeal Phase Dysfunction   Pharyngeal Phase  Pharyngeal Phase: Exceptions  Indicators of Pharyngeal Phase Dysfunction  Delayed Swallow: All  Decreased Laryngeal Elevation: All  Cough - Immediate: Thin - straw; Thin - teaspoon  Pharyngeal Phase   Pharyngeal: The pt had a wet cough with or without the presentation of food or liquid. The pt had a swallowing delay with all consistencies and there was a suspected decrease in laryngeal elevation. The pt had a consistent cough with the presentation of thin liquids, but these symptoms were reduced with honey thickened liquids. Prognosis  Prognosis  Prognosis for safe diet advancement: guarded  Barriers to reach goals: cognitive deficits  Individuals consulted  Consulted and agree with results and recommendations: Patient;RN;Family member  Family member consulted: Daughter    Education  Patient Education: Pt was given the results and rec's of this eval.  Patient Education Response: No evidence of learning       Therapy Time  SLP Individual Minutes  Time In: 1230  Time Out: 1315  Minutes: 45        Please accept this document as a D/C summary if pt is discharged from the hospital prior to the next speech therapy session.     Ilene Iraheta M.A./[facility]-SLP #[id number]  3/31/2019 1:25 PM

## 2019-03-31 NOTE — PROGRESS NOTES
[address]   Daily Progress Note      Admit Date:  3/29/2019      Subjective:   Ms. Mary Meyers is a 80 y.o. female with a past medical history significant for dementia, GERD and prior esophageal stricture s/p dilatation who was admitted to [facility] after presenting with shortness of breath an vomiting. Her daughter is present in the room. Efraín King has significant dementia but states she is feeling fine. She offers no complaints.      Telemetry shows atrial flutter with variable AV conduction.      Objective:     /81   Pulse (!) 47   Temp 97.4 °F (36.3 °C) (Oral)   Resp 16   Ht 5' 2\" (1.575 m)   Wt 130 lb 4.7 oz (59.1 kg)   SpO2 99%   BMI 23.83 kg/m²      Intake/Output Summary (Last 24 hours) at 3/31/2019 1312  Last data filed at 3/31/2019 0600  Gross per 24 hour   Intake 878.9 ml   Output 350 ml   Net 528.9 ml       Physical Exam:  General:  Awake, alert, NAD  Skin:  Warm and dry  Neck:  JVD<8, no carotid bruits  Chest:  Clear to auscultation, no wheezes/rhonchi/rales  Cardiovascular:  Irreg irreg, normal S1/S2, no M/R/G  Abdomen:  Soft, nontender, +bowel sounds  Extremities:  No edema  Pulses: 2+ bilat carotid    2+ bilat radial    2+ bilat femoral        Medications:    enoxaparin  1 mg/kg Subcutaneous Daily    allopurinol  100 mg Oral Daily    aspirin  81 mg Oral Every Other Day    colchicine  0.6 mg Oral Daily    escitalopram  10 mg Oral Daily    pantoprazole  40 mg Oral Daily    potassium chloride  10 mEq Oral Every Other Day    sodium chloride flush  10 mL Intravenous 2 times per day    ampicillin-sulbactam  1.5 g Intravenous Q6H    insulin lispro  0-12 Units Subcutaneous TID WC    insulin lispro  0-6 Units Subcutaneous Nightly      sodium chloride 75 mL/hr at 03/31/19 0633    dextrose         Lab Data:  CBC:   Recent Labs     03/29/19  1253 03/30/19  0511   WBC 17.8* 16.1*   HGB 15.8 12.7    249     BMP:    Recent Labs     03/29/19  1253 03/30/19  0511 03/31/19  0529    138 139   K 4.5 3.7 3.7   CO2 16* 24 24   BUN 25* 26* 38*   CREATININE 1.4* 1.6* 1.3*     LIVR:   Recent Labs     03/29/19  1253   AST 23   ALT 7*     PT/INR:   Recent Labs     03/29/19  1253   PROT 7.7     Recent Labs     03/29/19  1902 03/29/19  2209   TROPONINI 0.01 <0.01     FASTING LIPID PANEL:  Lab Results   Component Value Date    CHOL 112 03/30/2019    HDL 48 03/30/2019    TRIG 83 03/30/2019     [id number] Score for Atrial Fibrillation Stroke Risk   Risk   Factors  Component Value   C CHF Yes 1   H HTN Yes 1   A2 Age >= 76 Yes,  (80 y.o.) 2   D DM Yes 1   S2 Prior Stroke/TIA No 0   V Vascular Disease No 0   A Age 74-69 No,  (80 y.o.) 0   Sc Sex female 1    [id number]  Score  6   Score last updated 3/01/17 8:43 PM    Click here for a link to the UpToDate guideline \"Atrial Fibrillation: Anticoagulation therapy to prevent embolization    Disclaimer: Risk Score calculation is dependent on accuracy of patient problem list and past encounter diagnosis. Echo 2/1/19:  Normal LV size and systolic function. Estimated ejection fraction is 60%.  Mild mitral regurgitation is present.   The left atrium is moderately dilated.   Normal right ventricular size and function.   Mild pulmonic regurgitation present.        Assessment:  1. Atrial flutter with variable AV response  2. Aspiration Pneumonia  3. Dementia, unspecified without behavioral disturbance      Plan:   Havery Gum is doing fine. Her rate is moderately controlled. I would change her IV Lopressor to Toprol XL 25mg daily. After discussion with her daughter, we will change her from therapeutic Lovenox to oral anticoagulation with Xarelto 15mg daily. She has some potential to retain fluid with her a-fib. I would put her on low dose lasix 20mg daily now. Recheck a BMP in a week if she is to be discharged. She can follow up with Dr. Eliana Harris in the office or us depending on preference.          Signed:  Macario Zapata MD

## 2019-03-31 NOTE — PROGRESS NOTES
Hospitalist Progress Note      PCP: Kati Ayers MD    Date of Admission: 3/29/2019    Chief Complaint: SOB, cough. Subjective:     Hx of esophageal stricture s/p dilation in Dec.     Had emesis, then SOB and cough prior to admission - suspected aspiration event. Admitted with aspiration PNA secondary to dysphagia. Pt has fairly advanced dementia. She denies any complaint today. She denies repeat nausea or vomiting. No pain. Medications:  Reviewed    Infusion Medications    sodium chloride 75 mL/hr at 03/31/19 3802    diltiazem Stopped (03/30/19 1746)    dextrose       Scheduled Medications    enoxaparin  1 mg/kg Subcutaneous Daily    allopurinol  100 mg Oral Daily    aspirin  81 mg Oral Every Other Day    colchicine  0.6 mg Oral Daily    escitalopram  10 mg Oral Daily    pantoprazole  40 mg Oral Daily    potassium chloride  10 mEq Oral Every Other Day    sodium chloride flush  10 mL Intravenous 2 times per day    ampicillin-sulbactam  1.5 g Intravenous Q6H    insulin lispro  0-12 Units Subcutaneous TID WC    insulin lispro  0-6 Units Subcutaneous Nightly     PRN Meds: metoprolol, sodium chloride flush, magnesium hydroxide, ondansetron, glucose, dextrose, glucagon (rDNA), dextrose      Intake/Output Summary (Last 24 hours) at 3/31/2019 1202  Last data filed at 3/31/2019 0600  Gross per 24 hour   Intake 878.9 ml   Output 350 ml   Net 528.9 ml       Physical Exam Performed:    /72   Pulse 94   Temp 97.4 °F (36.3 °C) (Oral)   Resp 14   Ht 5' 2\" (1.575 m)   Wt 130 lb 4.7 oz (59.1 kg)   SpO2 97%   BMI 23.83 kg/m²     General appearance: weak and ill appearing. HEENT: Pupils equal, round, and reactive to light. Conjunctivae/corneas clear. Neck: Supple, with full range of motion. No jugular venous distention. Trachea midline. Respiratory:  Normal respiratory effort. Clear to auscultation, bilaterally without Rales/Wheezes/Rhonchi.   Cardiovascular: Regular rate and rhythm with normal S1/S2 without murmurs, rubs or gallops. Abdomen: Soft, non-tender, non-distended with normal bowel sounds. Musculoskeletal: No clubbing, cyanosis, trace edema bilaterally. Full range of motion without deformity. Skin: Skin color, texture, turgor normal.  No rashes or lesions. Neurologic:  Neurovascularly intact without any focal sensory/motor deficits. Cranial nerves: II-XII intact, grossly non-focal.  Psychiatric: Alert and oriented to place and person, baseline dementia. Capillary Refill: Brisk,< 3 seconds   Peripheral Pulses: +2 palpable, equal bilaterally       Labs:   Recent Labs     03/29/19  1253 03/30/19  0511   WBC 17.8* 16.1*   HGB 15.8 12.7   HCT 50.4* 40.5    249     Recent Labs     03/29/19  1253 03/30/19  0511 03/31/19  0529    138 139   K 4.5 3.7 3.7    100 104   CO2 16* 24 24   BUN 25* 26* 38*   CREATININE 1.4* 1.6* 1.3*   CALCIUM 10.2 9.1 8.8     Recent Labs     03/29/19  1253   AST 23   ALT 7*   BILITOT 1.4*   ALKPHOS 121     No results for input(s): INR in the last 72 hours. Recent Labs     03/29/19  1902 03/29/19  2209   TROPONINI 0.01 <0.01       Urinalysis:      Lab Results   Component Value Date    NITRU Negative 03/29/2019    WBCUA 3 03/29/2019    BACTERIA 4+ 03/29/2019    RBCUA 2 03/29/2019    BLOODU SMALL 03/29/2019    SPECGRAV 1.021 03/29/2019    GLUCOSEU Negative 03/29/2019       Radiology:  CT CHEST WO CONTRAST   Final Result   Bilateral airspace disease. Findings likely represent a combination of mild   edema and pneumonia. Some of these areas are somewhat nodular in appearance. Follow-up to resolution recommended to exclude underlying lesion. There is mucous plugging within the left lower lobe airways. Large hiatal hernia. XR CHEST PORTABLE   Final Result   Cardiomegaly with mild pulmonary vascular congestion.   No other significant   findings in the chest.         FL ESOPHAGRAM    (Results Pending)   Fluoroscopy modified barium swallow with video    (Results Pending)           Assessment/Plan:    Active Hospital Problems    Diagnosis Date Noted    Typical atrial flutter (HCC) [I48.3]     Septicemia ([facility] [A41.9]     Aspiration pneumonia ([facility] [J69.0]     CAP (community acquired pneumonia) due to Chlamydia species [J16.0] 03/29/2019    CHF (congestive heart failure), NYHA class I, acute on chronic, combined ([facility] [I50.43] 03/29/2019    Sepsis ([facility] [A41.9] 03/29/2019    Dementia without behavioral disturbance [F03.90] 04/25/2018         Admitted for acute hypoxic respiratory failure secondary to possible aspiration pneumonia  Due to dysphagia with esophageal strictures and emesis. Cont Unasyn IV. MBS pending      Sepsis POA - due to above. Lactic Acidosis - suspect due to above and also hypxia. Resolving 8 down to 1.9. DEVON -   Stop lasix. Gentle IVF till able to tolerate PO - Cr better today. Hypomagnesemia - replace IV. Afib RVR -   Went into afib 3/29,   Daughter reports no prior Hx. EKG in Dec with Afib. Plan:    - started Tx lovenox   - treat underlying cause. - cardiology EP eval - noted pt responding to BB.    - did not require dilt gtt - will stop the order. History of esophageal strictures requiring dilatation in December 2018 by Dr. Kathy Glover. GI consult placed. Gi wants to review MBS results prior to deciding on further endoscopy. Chronic stable gout on allopurinol and colchicine. No evidence of joint pain at this time. History of CAD, no chest pain at this time, continue aspirin. DVT Prophylaxis: lovenox based on age and renal function.    Diet: DIET FULL LIQUID; Low Sodium (2 GM)  Code Status: Full Code      Dispo - PCU    Deven Dumont MD

## 2019-03-31 NOTE — PROGRESS NOTES
INPATIENT CONSULTATION:    IDENTIFYING DATA/REASON FOR CONSULTATION   PATIENT:  Hina Mccloud  MRN:  [id number]  ADMIT DATE: 3/29/2019  TIME OF EVALUATION: 3/31/2019 6:40 AM  HOSPITAL STAY:   LOS: 2 days   CONSULTING PHYSICIAN:  REASON FOR CONSULTATION:    Subjective:    Patient denies any complaints this AM.     MEDICATIONS   SCHEDULED:    enoxaparin 1 mg/kg Daily   allopurinol 100 mg Daily   aspirin 81 mg Every Other Day   colchicine 0.6 mg Daily   escitalopram 10 mg Daily   pantoprazole 40 mg Daily   potassium chloride 10 mEq Every Other Day   sodium chloride flush 10 mL 2 times per day   ampicillin-sulbactam 1.5 g Q6H   insulin lispro 0-12 Units TID WC   insulin lispro 0-6 Units Nightly     FLUIDS/DRIPS:     sodium chloride 75 mL/hr at 03/31/19 1179    diltiazem Stopped (03/30/19 1746)    dextrose       PRNs:   metoprolol 5 mg Q4H PRN   sodium chloride flush 10 mL PRN   magnesium hydroxide 30 mL Daily PRN   ondansetron 4 mg Q6H PRN   glucose 15 g PRN   dextrose 12.5 g PRN   glucagon (rDNA) 1 mg PRN   dextrose 100 mL/hr PRN     ALLERGIES:  She [unfilled]      PHYSICAL EXAM   [unfilled]   I/O last 3 completed shifts:   In: 46 [P.O.:340; IV Piggyback:50]  Out: 1100 [Urine:1100]  Oxygen Therapy:  @[id number]([id number])@  @KZPNJQHCRY49([id number])@  @VOXWMBZOEM40([id number])@    Physical Exam:  Gen: Resting in bed, NAD   CV: Irreg Irreg  no MRG   Pul: Bilateral Rales   Abd: Good bowel sounds throughout, no scars, soft, NT/ND, no masses, no HSM   Ext: No edema   Neuro: No asterixis   Skin: No jaundice, spider angiomas, anguiano erythema     LABS AND IMAGING     Recent Results (from the past 24 hour(s))   POCT Glucose    Collection Time: 03/30/19  7:41 AM   Result Value Ref Range    POC Glucose 140 (H) 70 - 99 mg/dl    Performed on ACCU-CHEK    POCT Glucose    Collection Time: 03/30/19 11:35 AM   Result Value Ref Range    POC Glucose 150 (H) 70 - 99 mg/dl    Performed on ACCU-CHEK    Lactic Acid, Plasma Collection Time: 03/30/19 11:43 AM   Result Value Ref Range    Lactic Acid 1.9 0.4 - 2.0 mmol/L   EKG 12 Lead    Collection Time: 03/30/19 12:50 PM   Result Value Ref Range    Ventricular Rate 158 BPM    Atrial Rate 96 BPM    QRS Duration 92 ms    Q-T Interval 306 ms    QTc Calculation (Bazett) 496 ms    R Axis -20 degrees    T Axis 82 degrees    Diagnosis       Supraventricular tachycardia with Fusion complexesInferior infarct (cited on or before 29-MAR-2019)Anterior infarct (cited on or before 06-DEC-2018)Abnormal ECGWhen compared with ECG of 29-MAR-2019 16:44,Fusion complexes are now PresentVent.  rate has increased BY  55 BPMNon-specific change in ST segment in Inferior leadsNon-specific change in ST segment in Lateral leadsNonspecific T wave abnormality no longer evident in Anterior leads   POCT Glucose    Collection Time: 03/30/19  4:56 PM   Result Value Ref Range    POC Glucose 138 (H) 70 - 99 mg/dl    Performed on ACCU-CHEK    Lactic Acid, Plasma    Collection Time: 03/30/19  6:37 PM   Result Value Ref Range    Lactic Acid 2.1 (H) 0.4 - 2.0 mmol/L   POCT Glucose    Collection Time: 03/30/19  8:47 PM   Result Value Ref Range    POC Glucose 164 (H) 70 - 99 mg/dl    Performed on ACCU-CHEK    Lactic Acid, Plasma    Collection Time: 03/31/19 12:59 AM   Result Value Ref Range    Lactic Acid 1.5 0.4 - 2.0 mmol/L   POCT Glucose    Collection Time: 03/31/19  2:47 AM   Result Value Ref Range    POC Glucose 115 (H) 70 - 99 mg/dl    Performed on ACCU-CHEK    Basic Metabolic Panel    Collection Time: 03/31/19  5:29 AM   Result Value Ref Range    Sodium 139 136 - 145 mmol/L    Potassium 3.7 3.5 - 5.1 mmol/L    Chloride 104 99 - 110 mmol/L    CO2 24 21 - 32 mmol/L    Anion Gap 11 3 - 16    Glucose 135 (H) 70 - 99 mg/dL    BUN 38 (H) 7 - 20 mg/dL    CREATININE 1.3 (H) 0.6 - 1.2 mg/dL    GFR Non-African American 39 (A) >60    GFR  47 (A) >60    Calcium 8.8 8.3 - 10.6 mg/dL   Magnesium    Collection Time:

## 2019-04-01 ENCOUNTER — APPOINTMENT (OUTPATIENT)
Dept: GENERAL RADIOLOGY | Age: 84
DRG: 871 | End: 2019-04-01
Payer: MEDICARE

## 2019-04-01 LAB
ANION GAP SERPL CALCULATED.3IONS-SCNC: 11 MMOL/L (ref 3–16)
BASOPHILS ABSOLUTE: 0 K/UL (ref 0–0.2)
BASOPHILS RELATIVE PERCENT: 0.2 %
BUN BLDV-MCNC: 21 MG/DL (ref 7–20)
CALCIUM SERPL-MCNC: 8.7 MG/DL (ref 8.3–10.6)
CHLORIDE BLD-SCNC: 105 MMOL/L (ref 99–110)
CO2: 23 MMOL/L (ref 21–32)
CREAT SERPL-MCNC: 1.1 MG/DL (ref 0.6–1.2)
EOSINOPHILS ABSOLUTE: 0 K/UL (ref 0–0.6)
EOSINOPHILS RELATIVE PERCENT: 0.2 %
GFR AFRICAN AMERICAN: 57
GFR NON-AFRICAN AMERICAN: 47
GLUCOSE BLD-MCNC: 137 MG/DL (ref 70–99)
GLUCOSE BLD-MCNC: 141 MG/DL (ref 70–99)
GLUCOSE BLD-MCNC: 146 MG/DL (ref 70–99)
GLUCOSE BLD-MCNC: 160 MG/DL (ref 70–99)
GLUCOSE BLD-MCNC: 166 MG/DL (ref 70–99)
HCT VFR BLD CALC: 36.7 % (ref 36–48)
HEMOGLOBIN: 11.5 G/DL (ref 12–16)
LYMPHOCYTES ABSOLUTE: 1.2 K/UL (ref 1–5.1)
LYMPHOCYTES RELATIVE PERCENT: 9.7 %
MAGNESIUM: 2 MG/DL (ref 1.8–2.4)
MCH RBC QN AUTO: 26.8 PG (ref 26–34)
MCHC RBC AUTO-ENTMCNC: 31.5 G/DL (ref 31–36)
MCV RBC AUTO: 85.3 FL (ref 80–100)
MONOCYTES ABSOLUTE: 0.9 K/UL (ref 0–1.3)
MONOCYTES RELATIVE PERCENT: 7.6 %
NEUTROPHILS ABSOLUTE: 9.8 K/UL (ref 1.7–7.7)
NEUTROPHILS RELATIVE PERCENT: 82.3 %
PDW BLD-RTO: 18.8 % (ref 12.4–15.4)
PERFORMED ON: ABNORMAL
PLATELET # BLD: 210 K/UL (ref 135–450)
PMV BLD AUTO: 9.9 FL (ref 5–10.5)
POTASSIUM SERPL-SCNC: 3.9 MMOL/L (ref 3.5–5.1)
RBC # BLD: 4.3 M/UL (ref 4–5.2)
SODIUM BLD-SCNC: 139 MMOL/L (ref 136–145)
WBC # BLD: 11.9 K/UL (ref 4–11)

## 2019-04-01 PROCEDURE — 2700000000 HC OXYGEN THERAPY PER DAY

## 2019-04-01 PROCEDURE — 2060000000 HC ICU INTERMEDIATE R&B

## 2019-04-01 PROCEDURE — 6370000000 HC RX 637 (ALT 250 FOR IP): Performed by: INTERNAL MEDICINE

## 2019-04-01 PROCEDURE — 97535 SELF CARE MNGMENT TRAINING: CPT

## 2019-04-01 PROCEDURE — 94761 N-INVAS EAR/PLS OXIMETRY MLT: CPT

## 2019-04-01 PROCEDURE — 80048 BASIC METABOLIC PNL TOTAL CA: CPT

## 2019-04-01 PROCEDURE — 6370000000 HC RX 637 (ALT 250 FOR IP): Performed by: PHYSICIAN ASSISTANT

## 2019-04-01 PROCEDURE — 97162 PT EVAL MOD COMPLEX 30 MIN: CPT | Performed by: PHYSICAL THERAPIST

## 2019-04-01 PROCEDURE — 74230 X-RAY XM SWLNG FUNCJ C+: CPT

## 2019-04-01 PROCEDURE — 6360000002 HC RX W HCPCS: Performed by: INTERNAL MEDICINE

## 2019-04-01 PROCEDURE — 97530 THERAPEUTIC ACTIVITIES: CPT

## 2019-04-01 PROCEDURE — 2580000003 HC RX 258: Performed by: INTERNAL MEDICINE

## 2019-04-01 PROCEDURE — 83735 ASSAY OF MAGNESIUM: CPT

## 2019-04-01 PROCEDURE — 92611 MOTION FLUOROSCOPY/SWALLOW: CPT

## 2019-04-01 PROCEDURE — 97166 OT EVAL MOD COMPLEX 45 MIN: CPT

## 2019-04-01 PROCEDURE — 85025 COMPLETE CBC W/AUTO DIFF WBC: CPT

## 2019-04-01 PROCEDURE — 36415 COLL VENOUS BLD VENIPUNCTURE: CPT

## 2019-04-01 PROCEDURE — 97530 THERAPEUTIC ACTIVITIES: CPT | Performed by: PHYSICAL THERAPIST

## 2019-04-01 PROCEDURE — 99232 SBSQ HOSP IP/OBS MODERATE 35: CPT | Performed by: NURSE PRACTITIONER

## 2019-04-01 RX ADMIN — POTASSIUM CHLORIDE 10 MEQ: 20 TABLET, EXTENDED RELEASE ORAL at 11:15

## 2019-04-01 RX ADMIN — METOPROLOL SUCCINATE 25 MG: 25 TABLET, EXTENDED RELEASE ORAL at 11:12

## 2019-04-01 RX ADMIN — ESCITALOPRAM OXALATE 10 MG: 10 TABLET ORAL at 10:55

## 2019-04-01 RX ADMIN — AMPICILLIN SODIUM AND SULBACTAM SODIUM 1.5 G: 1; .5 INJECTION, POWDER, FOR SOLUTION INTRAMUSCULAR; INTRAVENOUS at 03:32

## 2019-04-01 RX ADMIN — RIVAROXABAN 15 MG: 15 TABLET, FILM COATED ORAL at 17:13

## 2019-04-01 RX ADMIN — COLCHICINE 0.6 MG: 0.6 TABLET, FILM COATED ORAL at 10:58

## 2019-04-01 RX ADMIN — SODIUM CHLORIDE: 9 INJECTION, SOLUTION INTRAVENOUS at 14:29

## 2019-04-01 RX ADMIN — AMPICILLIN SODIUM AND SULBACTAM SODIUM 1.5 G: 1; .5 INJECTION, POWDER, FOR SOLUTION INTRAMUSCULAR; INTRAVENOUS at 21:43

## 2019-04-01 RX ADMIN — AMPICILLIN SODIUM AND SULBACTAM SODIUM 1.5 G: 1; .5 INJECTION, POWDER, FOR SOLUTION INTRAMUSCULAR; INTRAVENOUS at 17:13

## 2019-04-01 RX ADMIN — AMPICILLIN SODIUM AND SULBACTAM SODIUM 1.5 G: 1; .5 INJECTION, POWDER, FOR SOLUTION INTRAMUSCULAR; INTRAVENOUS at 10:55

## 2019-04-01 RX ADMIN — Medication 10 ML: at 21:43

## 2019-04-01 RX ADMIN — FUROSEMIDE 20 MG: 20 TABLET ORAL at 10:56

## 2019-04-01 RX ADMIN — INSULIN LISPRO 1 UNITS: 100 INJECTION, SOLUTION INTRAVENOUS; SUBCUTANEOUS at 21:44

## 2019-04-01 RX ADMIN — PANTOPRAZOLE SODIUM 40 MG: 40 TABLET, DELAYED RELEASE ORAL at 10:56

## 2019-04-01 RX ADMIN — INSULIN LISPRO 2 UNITS: 100 INJECTION, SOLUTION INTRAVENOUS; SUBCUTANEOUS at 17:25

## 2019-04-01 RX ADMIN — ALLOPURINOL 100 MG: 100 TABLET ORAL at 10:56

## 2019-04-01 ASSESSMENT — PAIN SCALES - GENERAL
PAINLEVEL_OUTOF10: 0

## 2019-04-01 NOTE — PROGRESS NOTES
place           Patient Diagnosis(es): The primary encounter diagnosis was Septicemia ([facility] [address] Diagnoses of Acute respiratory failure with hypoxia ([facility] [address] Pneumonia due to organism, and Acute pulmonary edema ([facility] [address] were also pertinent to this visit. has a past medical history of Cataract, Chronic midline low back pain without sciatica, Essential hypertension, GERD (gastroesophageal reflux disease), Gouty arthropathy, History of breast cancer, Hypercholesterolemia, Incontinence, Lumbar spondylosis, Osteoporosis of multiple sites, Primary osteoarthritis involving multiple joints, Spinal stenosis, lumbar, and Type 2 diabetes mellitus without complication, without long-term current use of insulin ([facility] [address] has a past surgical history that includes Mastectomy; Cataract removal (jan 2012); Cataract removal (Nov. 2012); Cholecystectomy; Upper gastrointestinal endoscopy; Upper gastrointestinal endoscopy (N/A, 12/6/2018); Esophagus dilation (N/A, 12/6/2018); Upper gastrointestinal endoscopy (12/13/2018); Upper gastrointestinal endoscopy (N/A, 12/13/2018); and Esophagus dilation (N/A, 12/13/2018). Restrictions  Restrictions/Precautions  Restrictions/Precautions: Fall Risk  Position Activity Restriction  Other position/activity restrictions: O2 NC    Subjective   General  Chart Reviewed: Yes  Patient assessed for rehabilitation services?: Yes  Additional Pertinent Hx: Patient is an 80 y.o. female who presents d/t SOB and vomiting; admitted for acute hypoxic respiratory failure secondary to possible aspiration pneumonia along with tachycardia. Family / Caregiver Present: No  Referring Practitioner: Billy Lechuga MD  Subjective  Subjective: Patient presents in the bed and agreeable to OT eval. Pleasently confused during session. Denies pain.    Pain Assessment  Pain Assessment: 0-10  Pain Level: 0  Patient's Stated Pain Goal: No pain     Social/Functional History  Social/Functional History  Lives With: term memory  Safety Judgement: Decreased awareness of need for safety;Decreased awareness of need for assistance  Problem Solving: Assistance required to identify errors made;Assistance required to correct errors made  Insights: Decreased awareness of deficits  Initiation: Requires cues for some  Sequencing: Requires cues for some  Cognition Comment: Hx Dementia. Sensation  Overall Sensation Status: (N/T)        LUE AROM (degrees)  LUE General AROM: Limited shoulder mobility to grossly 45*; WFL distally however difficult to formally assess. RUE PROM (degrees)  RUE General PROM: Limited shoulder mobility to grossly 45*; WFL distally however difficult to formally assess. LUE Strength  Gross LUE Strength: [facility]  LUE Strength Comment: Functionally poor strength/endurance to grossly 2+/5. RUE Strength  Gross RUE Strength: Exceptions to [facility]  RUE Strength Comment: Functionally poor strength/endurance to grossly 2+/5. Plan   Plan  Times per week: 3-5  Times per day: Daily  Current Treatment Recommendations: Strengthening, ROM, Balance Training, Functional Mobility Training, Endurance Training, Safety Education & Training, Patient/Caregiver Education & Training, Equipment Evaluation, Education, & procurement, Self-Care / ADL    AM-[facility] Score        AM-[facility] Inpatient Daily Activity Raw Score: 12  AM-PAC Inpatient ADL T-Scale Score : 30.6  ADL Inpatient CMS 0-100% Score: 66.57  ADL Inpatient CMS G-Code Modifier : CL    Goals  Short term goals  Time Frame for Short term goals: until d/c:   Short term goal 1: Fxl mobility/transfers via RW and min A x 2. Short term goal 2: Bathing with mod A. Short term goal 3: Dressing with mod A. Short term goal 4: Toileting with mod A. Short term goal 5: Grooming with min A. Patient Goals   Patient goals : Pt did not state goal d/t decreased cognition.         Therapy Time   Individual Concurrent Group Co-treatment   Time In 1162         Time Out 8540

## 2019-04-01 NOTE — PROGRESS NOTES
Physical Therapy    Facility/Department: [facility] 3D PROGRESSIVE CARE  Initial Assessment    NAME: Joel Cooper  : 1929  MRN: [id number]    Date of Service: 2019    Discharge Recommendations:  Patient would benefit from continued therapy after discharge, 3-5 sessions per week   PT Equipment Recommendations  Other: will continue to assess  Joel Cooper scored a  on the AM-PAC short mobility form. Current research shows that an AM-PAC score of 17 or less is typically not associated with a discharge to the patient's home setting. Based on the patients AM-PAC score and their current functional mobility deficits, it is recommended that the patient have 3-5 sessions per week of Physical Therapy at d/c to increase the patients independence. Assessment   Body structures, Functions, Activity limitations: Decreased functional mobility   Assessment: pt is an 81 yo female who was adm to hosp with vomiting and SOB; she was found to have hypoxic respiratory failure and sepsis possibly r/t aspiratin or Pneumonia  Decision Making: Medium Complexity  Patient Education: role and purpose of PT; reviewed call light and not getting up without assistance  Barriers to Learning: delayed processing  REQUIRES PT FOLLOW UP: Yes  Activity Tolerance  Activity Tolerance: Patient limited by fatigue       Patient Diagnosis(es): The primary encounter diagnosis was Septicemia ([address] Diagnoses of Acute respiratory failure with hypoxia ([address] Pneumonia due to organism, and Acute pulmonary edema ([address] were also pertinent to this visit.      has a past medical history of Cataract, Chronic midline low back pain without sciatica, Essential hypertension, GERD (gastroesophageal reflux disease), Gouty arthropathy, History of breast cancer, Hypercholesterolemia, Incontinence, Lumbar spondylosis, Osteoporosis of multiple sites, Primary osteoarthritis involving multiple joints, Spinal stenosis, lumbar, and Type 2 diabetes mellitus without complication, without long-term current use of insulin ([facility] [address] has a past surgical history that includes Mastectomy; Cataract removal (jan 2012); Cataract removal (Nov. 2012); Cholecystectomy; Upper gastrointestinal endoscopy; Upper gastrointestinal endoscopy (N/A, 12/6/2018); Esophagus dilation (N/A, 12/6/2018); Upper gastrointestinal endoscopy (12/13/2018); Upper gastrointestinal endoscopy (N/A, 12/13/2018); and Esophagus dilation (N/A, 12/13/2018). Restrictions  Restrictions/Precautions  Restrictions/Precautions: Fall Risk  Position Activity Restriction  Other position/activity restrictions: O2 NC  Vision/Hearing  Vision: Within Functional Limits  Hearing: Within functional limits(slightly [place])     Subjective  General  Chart Reviewed: Yes  Patient assessed for rehabilitation services?: Yes  Additional Pertinent Hx: pt is an 79 yo female who was adm to hosp with SOB and vomiting; current hospital problems:  hypoxic respiratory failure secondary to possible PNA and dysphagia, sepsis and lactic acidosis  Response To Previous Treatment: Not applicable  Family / Caregiver Present: No  Follows Commands: Within Functional Limits(delayed processing)  General Comment  Comments: pt scheduled for a barium swallow  Subjective  Subjective: pt very pleasant and agreeable to participating in therapy; no C/O pain  Pain Screening  Patient Currently in Pain: No          Orientation  Orientation  Overall Orientation Status: Impaired  Orientation Level: Oriented to person;Oriented to place  Social/Functional History  Social/Functional History  Lives With: Daughter  Type of Home: House  Bathroom Shower/Tub: Tub/Shower unit  ADL Assistance: Needs assistance(reports assist from daughter )  Ambulation Assistance: Independent(with 9KV?)  Transfer Assistance: Independent  Active : No  Additional Comments: Patient presents as a poor historian this date therefore will need to confirm PLOF.    Cognition   Cognition  Overall Cognitive Status: Exceptions  Arousal/Alertness: Delayed responses to stimuli  Following Commands: Follows one step commands with increased time; Follows one step commands with repetition  Attention Span: Attends with cues to redirect  Memory: Decreased recall of recent events;Decreased short term memory  Safety Judgement: Decreased awareness of need for safety;Decreased awareness of need for assistance  Problem Solving: Assistance required to identify errors made;Assistance required to correct errors made  Insights: Decreased awareness of deficits  Initiation: Requires cues for some  Sequencing: Requires cues for some  Cognition Comment: Hx Dementia. Objective          AROM RLE (degrees)  RLE AROM: WFL(AAROM)  AROM LLE (degrees)  LLE AROM : WFL(AAROM)  Strength RLE  Comment: functionally poor  Strength LLE  Comment: functionally poor     Sensation  Overall Sensation Status: (N/T)  Bed mobility  Supine to Sit: Moderate assistance;Maximum assistance  Scooting: Maximal assistance  Transfers  Sit to Stand:  Moderate Assistance;2 Person Assistance(with walker and jayant stedy)  Stand to sit: Moderate Assistance;2 Person Assistance  Bed to Chair: Dependent/Total(with jayant stedy)  Ambulation  Ambulation?: No  Gait Deviations  Surface: pt unable to take any steps as pt needed mod/max A of 2 for standing balance     Balance  Comments: CGA to min/mod A for sitting balance; mod/max A of 2 for standing        Plan   Plan  Times per week: 3-5  Current Treatment Recommendations: Functional Mobility Training  Safety Devices  Type of devices: Call light within reach, Chair alarm in place, Left in chair, Nurse notified      AM-PAC Score  AM-PAC Inpatient Mobility Raw Score : 9  AM-[facility] Inpatient T-Scale Score : 30.55  Mobility Inpatient CMS 0-100% Score: 81.38  Mobility Inpatient CMS G-Code Modifier : CM          Goals  Short term goals  Time Frame for Short term goals: by discharge  Short term goal 1: bed mob min A  Short term goal

## 2019-04-01 NOTE — PROGRESS NOTES
Pt's aguirre catheter leaked twice today. First time it was not much, second time there was a lot of leakage. Output therefore is not accurate. Pt does cough frequently, not sure if the leaking is occurring because of that. Will monitor.

## 2019-04-01 NOTE — PROGRESS NOTES
hour(s))   POCT Glucose    Collection Time: 03/31/19  5:00 PM   Result Value Ref Range    POC Glucose 169 (H) 70 - 99 mg/dl    Performed on ACCU-CHEK    POCT Glucose    Collection Time: 03/31/19  9:14 PM   Result Value Ref Range    POC Glucose 132 (H) 70 - 99 mg/dl    Performed on ACCU-CHEK    Basic Metabolic Panel    Collection Time: 04/01/19  5:36 AM   Result Value Ref Range    Sodium 139 136 - 145 mmol/L    Potassium 3.9 3.5 - 5.1 mmol/L    Chloride 105 99 - 110 mmol/L    CO2 23 21 - 32 mmol/L    Anion Gap 11 3 - 16    Glucose 146 (H) 70 - 99 mg/dL    BUN 21 (H) 7 - 20 mg/dL    CREATININE 1.1 0.6 - 1.2 mg/dL    GFR Non- 47 (A) >60    GFR  57 (A) >60    Calcium 8.7 8.3 - 10.6 mg/dL   Magnesium    Collection Time: 04/01/19  5:36 AM   Result Value Ref Range    Magnesium 2.00 1.80 - 2.40 mg/dL   CBC Auto Differential    Collection Time: 04/01/19  5:36 AM   Result Value Ref Range    WBC 11.9 (H) 4.0 - 11.0 K/uL    RBC 4.30 4.00 - 5.20 M/uL    Hemoglobin 11.5 (L) 12.0 - 16.0 g/dL    Hematocrit 36.7 36.0 - 48.0 %    MCV 85.3 80.0 - 100.0 fL    MCH 26.8 26.0 - 34.0 pg    MCHC 31.5 31.0 - 36.0 g/dL    RDW 18.8 (H) 12.4 - 15.4 %    Platelets 208 205 - 844 K/uL    MPV 9.9 5.0 - 10.5 fL    Neutrophils % 82.3 %    Lymphocytes % 9.7 %    Monocytes % 7.6 %    Eosinophils % 0.2 %    Basophils % 0.2 %    Neutrophils # 9.8 (H) 1.7 - 7.7 K/uL    Lymphocytes # 1.2 1.0 - 5.1 K/uL    Monocytes # 0.9 0.0 - 1.3 K/uL    Eosinophils # 0.0 0.0 - 0.6 K/uL    Basophils # 0.0 0.0 - 0.2 K/uL   POCT Glucose    Collection Time: 04/01/19  7:43 AM   Result Value Ref Range    POC Glucose 137 (H) 70 - 99 mg/dl    Performed on ACCU-CHEK    POCT Glucose    Collection Time: 04/01/19 11:49 AM   Result Value Ref Range    POC Glucose 141 (H) 70 - 99 mg/dl    Performed on ACCU-CHEK      Other Labs    Imaging  CT CHEST WO CONTRAST   Final Result   Bilateral airspace disease.   Findings likely represent a combination of mild edema and pneumonia. Some of these areas are somewhat nodular in appearance. Follow-up to resolution recommended to exclude underlying lesion. There is mucous plugging within the left lower lobe airways. Large hiatal hernia. XR CHEST PORTABLE   Final Result   Cardiomegaly with mild pulmonary vascular congestion. No other significant   findings in the chest.         FL ESOPHAGRAM    (Results Pending)   Fluoroscopy modified barium swallow with video    (Results Pending)       Endoscopy      ASSESSMENT AND RECOMMENDATIONS   Maximiliano Hodges is a 80 y.o. female with PMH of Dementia, GERD, Esophageal Stricture, DM2, Osteoporosis who presents for SOB and concern for aspiration pneumonia. We have been consulted regarding esophageal stricture. Esophagus previously dilated by Dr. Gomes Cea to 40 [place] with tearing noted in the UES. Esophageal stricture was not severe based on endoscopic description. Patient denies any dysphagia but her history is unreliable. Seen by SLP-> pt with moderate oropharyngeal dysphagia characterized by poor lingual coordination for bolus formation and posterior propulsion. MBS pending        1. Dysphagia: suspect due to oropharyngeal impairment vs esophageal stricture. MBS pending. Not an ideal candidate for EGD given advanced age and current respiratory status. 2. Pneumonia with concern for Aspiration Pneumonia   3. Dyspnea: On 3L O2 overnight      RECOMMENDATIONS:    Agree with MBS   Diet consistency per SLP recommendations      If you have any questions or need any further information, please feel free to contact us [phone]. Thank you for allowing us to participate in the care of Maximiliano Hodges. Trevin MADRIGAL    Attending physician addendum:      I have personally seen and examined the patient, reviewed the patient's medical record and pertinent labs and clinical imaging. I have personally staffed the case with ROHAN Hamm.   I agree with her consultation note, exam findings, assessment and plans  as written above. I have made appropriate modifications and edited her assessment and plan where needed to reflect my impression and plans for this patient. Patient admitted after aspiration event and CT with bilateral pneumonia. Suspect she likely has multifactorial dysphagia. The patient had a recent EGD in 12/2018 with large hiatal hernia, Schatzki's ring and had a 40 Fr Lomas dilation performed. She has had recurrent aspiration. Suspect there may be a component of oropharyngeal dysfunction. This was verified on her MBS    Plan:   Speech recommends Dysphagia 1 pureed diet with nectar thick liquids  Will defer any additional endoscopic workup at this time in light of her clear cut oropharyngeal dysfunction, recent EGD with mild findings, and her recent pneumonia with O2 requirements and use of Xarelto     Call with ? Thank you for allowing me to participate in this patient's care. If there are any questions or concerns regarding this patient, or the plan we have set in place, please feel free to contact me at [phone].      Martha Ansari, DO

## 2019-04-01 NOTE — PROGRESS NOTES
Hospitalist Progress Note    CC:     Emesis (N&V started last night. per EMS family states possible UTI )      Admit date: 3/29/2019  Days in hospital:  3    Subjective:   She denies any complaints. No acute events overnight. ROS:   Review of Systems   Constitutional: Negative for chills and fever. Respiratory: Negative for cough and shortness of breath. Cardiovascular: Negative for chest pain and palpitations. Gastrointestinal: Negative for abdominal pain, constipation and diarrhea. Genitourinary: Negative for dysuria and urgency. Neurological: Negative for headaches. Objective:    /85   Pulse 95   Temp 97.3 °F (36.3 °C) (Axillary)   Resp 20   Ht 5' 2\" (1.575 m)   Wt 132 lb 7.9 oz (60.1 kg)   SpO2 98%   BMI 24.23 kg/m²     Gen: alert, NAD  HEENT: NC/AT, moist mucous membranes, no oropharyngeal erythema or exudate  Neck: supple, trachea midline, no anterior cervical or SC LAD  Heart: Normal s1/s2, RRR, no murmurs, gallops, or rubs. Lungs: CTA bilaterally, no wheezing, no rales, no rhonchi, no use of accessory muscles  Abd: bowel sounds present, soft, nondistended, none tender   Extrem: No clubbing, cyanosis, no  edema  Psych: Alert, oriented to place , affect appropriate  Neuro: grossly intact, moves all four extremities spontaneously.       Medications:  Scheduled Meds:   rivaroxaban  15 mg Oral Daily    metoprolol succinate  25 mg Oral Daily    furosemide  20 mg Oral Daily    allopurinol  100 mg Oral Daily    colchicine  0.6 mg Oral Daily    escitalopram  10 mg Oral Daily    pantoprazole  40 mg Oral Daily    potassium chloride  10 mEq Oral Every Other Day    sodium chloride flush  10 mL Intravenous 2 times per day    ampicillin-sulbactam  1.5 g Intravenous Q6H    insulin lispro  0-12 Units Subcutaneous TID WC    insulin lispro  0-6 Units Subcutaneous Nightly       PRN Meds:  metoprolol, sodium chloride flush, magnesium hydroxide, ondansetron, glucose, dextrose, glucagon (rDNA), dextrose    IV:   sodium chloride 75 mL/hr at 04/01/19 1429    dextrose           Intake/Output Summary (Last 24 hours) at 4/1/2019 1645  Last data filed at 4/1/2019 1429  Gross per 24 hour   Intake 2950 ml   Output 950 ml   Net 2000 ml       Results:  CBC:   Recent Labs     03/30/19  0511 04/01/19  0536   WBC 16.1* 11.9*   HGB 12.7 11.5*   HCT 40.5 36.7   MCV 84.5 85.3    210     BMP:   Recent Labs     03/30/19  0511 03/31/19  0529 04/01/19  0536    139 139   K 3.7 3.7 3.9    104 105   CO2 24 24 23   BUN 26* 38* 21*   CREATININE 1.6* 1.3* 1.1     Mag: No results for input(s): MAG in the last 72 hours. Phos: No results found for: PHOS  No components found for: GLU    LIVER PROFILE: No results for input(s): AST, ALT, LIPASE, BILIDIR, BILITOT, ALKPHOS in the last 72 hours. Invalid input(s): AMYLASE,  ALB  PT/INR: No results for input(s): PROTIME, INR in the last 72 hours. APTT: No results for input(s): APTT in the last 72 hours. UA:No results for input(s): NITRITE, COLORU, PHUR, LABCAST, WBCUA, RBCUA, MUCUS, TRICHOMONAS, YEAST, BACTERIA, CLARITYU, SPECGRAV, LEUKOCYTESUR, UROBILINOGEN, BILIRUBINUR, BLOODU, GLUCOSEU, AMORPHOUS in the last 72 hours. Invalid input(s): [place] input(s): ABG  Lab Results   Component Value Date    CALCIUM 8.7 04/01/2019       Assessment and Plan:    Acute hypoxic respiratory failure secondary to possible aspiration pneumonia  Due to dysphagia with esophageal strictures and emesis. Cont Unasyn IV for one more day   MBS reviewed   Blood Cx is NTD        Sepsis POA - due to above.         Lactic Acidosis - suspect due to above and also hypxia. Resolving 8 down to 1.9.         DEVNO -   Resolved                Afib RVR -   Went into afib 3/29,   Xeralto   BB         History of esophageal strictures requiring dilatation in December 2018 by Dr. Foreign Altman.   GI F/U         Chronic stable gout on allopurinol and colchicine.  No evidence of joint pain at this time.        History of CAD, no chest pain at this time, continue aspirin.           Code status:  Full code   DVT prophylaxis: Xeralto   Disposition: Still on O2. In the mext 24-48 hours.      Electronically signed by Sadi Shah MD on 4/1/2019 at 4:45 PM

## 2019-04-01 NOTE — PLAN OF CARE
Nutrition Problem: Biting/chewing difficulty  Intervention: Food and/or Nutrient Delivery: Continue current diet(refer to call center to offer soft foods unless ordered otherwise)  Nutritional Goals: tolerate most appropriate form of nutrition
Problem: Falls - Risk of:  Goal: Will remain free from falls  Description  Will remain free from falls  3/31/2019 0929 by Olivia Larsen RN  Outcome: Ongoing  3/30/2019 2335 by Nalini Godfrey RN  Outcome: Ongoing  Goal: Absence of physical injury  Description  Absence of physical injury  3/31/2019 0929 by Olivia Larsen RN  Outcome: Ongoing  3/30/2019 2335 by Nalini Godfrey RN  Outcome: Ongoing     Problem: Risk for Impaired Skin Integrity  Goal: Tissue integrity - skin and mucous membranes  Description  Structural intactness and normal physiological function of skin and  mucous membranes.   3/31/2019 0929 by Olivia Larsen RN  Outcome: Ongoing  3/30/2019 2335 by Nalini Godfrey RN  Outcome: Ongoing     Problem: Discharge Planning:  Goal: Discharged to appropriate level of care  Description  Discharged to appropriate level of care  3/31/2019 0929 by Olivia Larsen RN  Outcome: Ongoing  3/30/2019 2335 by Nalini Godfrey RN  Outcome: Ongoing     Problem: Gas Exchange - Impaired:  Goal: Levels of oxygenation will improve  Description  Levels of oxygenation will improve  3/31/2019 0929 by Olivia Larsen RN  Outcome: Ongoing  3/30/2019 2335 by Nalini Godfrey RN  Outcome: Ongoing     Problem: Infection, Septic Shock:  Goal: Will show no infection signs and symptoms  Description  Will show no infection signs and symptoms  3/31/2019 0929 by Olivia Larsen RN  Outcome: Ongoing  3/30/2019 2335 by Nalini Godfrey RN  Outcome: Ongoing     Problem: Tissue Perfusion, Altered:  Goal: Circulatory function within specified parameters  Description  Circulatory function within specified parameters  3/31/2019 0929 by Olivia Larsen RN  Outcome: Ongoing  3/30/2019 2335 by Nalini Godfrey RN  Outcome: Ongoing     Problem: OXYGENATION/RESPIRATORY FUNCTION  Goal: Patient will maintain patent airway  3/31/2019 0929 by Olivia Larsen RN  Outcome: Ongoing  3/30/2019 2335 by Nalini Godfrey RN  Outcome:
Problem: Falls - Risk of:  Goal: Will remain free from falls  Description  Will remain free from falls  Outcome: Ongoing   Fall risk precautions in place. Bed in lowest position with wheels locked,bed alarm in place and activated,non-skid socks on pt, fall risk ID on pt, call light in reach, will continue to monitor. Problem: Risk for Impaired Skin Integrity  Goal: Tissue integrity - skin and mucous membranes  Description  Structural intactness and normal physiological function of skin and  mucous membranes. Outcome: Ongoing   Skin assessment completed every shift. Pt assessed for incontinence, appropriate barrier cream applied prn. Pt encouraged to turn/rotate every 2 hours. Assistance provided if pt unable to do so themselves. Problem: HEMODYNAMIC STATUS  Goal: Patient has stable vital signs and fluid balance  Outcome: Ongoing   Vitals completed q4h and assessed by RN. I&O charted and assessed per MD order. Pt tolerating adequate fluid intake.
Problem: Falls - Risk of:  Goal: Will remain free from falls  Description  Will remain free from falls  Outcome: Ongoing  Goal: Absence of physical injury  Description  Absence of physical injury  Outcome: Ongoing     Problem: Risk for Impaired Skin Integrity  Goal: Tissue integrity - skin and mucous membranes  Description  Structural intactness and normal physiological function of skin and  mucous membranes.   Outcome: Ongoing     Problem: Discharge Planning:  Goal: Discharged to appropriate level of care  Description  Discharged to appropriate level of care  Outcome: Ongoing     Problem: Gas Exchange - Impaired:  Goal: Levels of oxygenation will improve  Description  Levels of oxygenation will improve  Outcome: Ongoing     Problem: Infection, Septic Shock:  Goal: Will show no infection signs and symptoms  Description  Will show no infection signs and symptoms  Outcome: Ongoing     Problem: Tissue Perfusion, Altered:  Goal: Circulatory function within specified parameters  Description  Circulatory function within specified parameters  Outcome: Ongoing     Problem: OXYGENATION/RESPIRATORY FUNCTION  Goal: Patient will maintain patent airway  Outcome: Ongoing  Goal: Patient will achieve/maintain normal respiratory rate/effort  Description  Respiratory rate and effort will be within normal limits for the patient  Outcome: Ongoing     Problem: HEMODYNAMIC STATUS  Goal: Patient has stable vital signs and fluid balance  Outcome: Ongoing     Problem: FLUID AND ELECTROLYTE IMBALANCE  Goal: Fluid and electrolyte balance are achieved/maintained  Outcome: Ongoing     Problem: ACTIVITY INTOLERANCE/IMPAIRED MOBILITY  Goal: Mobility/activity is maintained at optimum level for patient  Outcome: Ongoing     Problem: Nutrition  Goal: Optimal nutrition therapy  3/30/2019 2335 by Ahmet Pate RN  Outcome: Ongoing  3/30/2019 1226 by Zehra Brennan RD, LD  Outcome: Ongoing
chronic pain  Description  Control of chronic pain  Outcome: Met This Shift

## 2019-04-01 NOTE — PROCEDURES
INSTRUMENTAL SWALLOW REPORT  MODIFIED BARIUM SWALLOW    NAME: Alice Never   : 1929  MRN: [id number]       Date of Eval: 2019     Ordering Physician: Andrea Villatoro  Radiologist: Ajay Sainz     Referring Diagnosis: oropharyngeal dysphagia; r13.12    Alice Never has a past medical history of Cataract, Chronic midline low back pain without sciatica, Essential hypertension, GERD (gastroesophageal reflux disease), Gouty arthropathy, History of breast cancer, Hypercholesterolemia, Incontinence, Lumbar spondylosis, Osteoporosis of multiple sites, Primary osteoarthritis involving multiple joints, Spinal stenosis, lumbar, and Type 2 diabetes mellitus without complication, without long-term current use of insulin ([facility] [address] She has a past surgical history that includes Mastectomy; Cataract removal (2012); Cataract removal (2012); Cholecystectomy; Upper gastrointestinal endoscopy; Upper gastrointestinal endoscopy (N/A, 2018); Esophagus dilation (N/A, 2018); Upper gastrointestinal endoscopy (2018); Upper gastrointestinal endoscopy (N/A, 2018); and Esophagus dilation (N/A, 2018). Current Diet Solid Consistency: NPO  Current Diet Liquid Consistency: Full    Type of Study: Repeat MBS  2018 MBS:  Mild pharyngeal dysphagia   · Deficits re: reduced bolus control and cohesion with premature spillage to valleculae piror to swallow. Delayed swallow initiation. Reduced laryngeal elevation, anterior movement, and strength. Minimal-trace post swallow valleculae residue. · Pt with shallow flash penetration on NTL x1. Pt able to clear completely with completion of swallow. · Pt with initially shallow transient penetration on thin liquids via cup/straw. Pt able to clear completely. However, pt was noted to have slightly deeper penetration with thin liquids as assessment progressed (? Suspect possibly 2/2 fatigue); but pt was able to clear penetration with completion of swallow. · No aspiration. · REC continue on regular solids (dental soft/smaller bites) and thin liquids. Chart Review:  3/29/2019 Chest CT:  Impression   Bilateral airspace disease.  Findings likely represent a combination of mild   edema and pneumonia.  Some of these areas are somewhat nodular in appearance. Follow-up to resolution recommended to exclude underlying lesion.       There is mucous plugging within the left lower lobe airways.       Large hiatal hernia.       3/31/2019 Internal Medicine:  Hx of esophageal stricture s/p dilation in Dec.  Had emesis, then SOB and cough prior to admission - suspected aspiration event. Admitted with aspiration PNA secondary to dysphagia. Pt has fairly advanced dementia. She denies any complaint today. She denies repeat nausea or vomiting. No pain. Patient Complaints/Reason for Referral:  · Veronica Hester was referred for a MBS to assess the efficiency of his/her swallow function, assess for aspiration, and to make recommendations regarding safe dietary consistencies, effective compensatory strategies, and safe eating environment. · Patient complaints: patient without dysphagia complaint; medical team with concern for aspiration    Onset of problem:   Date of Onset: 3/29/2019    Subjective  Subjective: Accepted and tolerated MBS in fluoroscopy suite    Pain   Patient Currently in Pain: Denies    Behavior/Cognition/Vision/Hearing:  Behavior/Cognition: Alert; Cooperative;Pleasant mood;Confused; Doesn't follow directions  Vision: Within Functional Limits  Hearing: Within functional limits    Patient Position: Lateral   Patient Degrees: Fully upright in VSE chair  Consistencies Administered: Mechanical soft solid; Soft solid;Puree; Honey cup;Nectar cup; Thin cup; Thin straw    Impressions:  Treatment Dx and ICD 10: oropharyngeal dysphagia; r13.12    Oral Phase:    Moderate severe oral stage dysphagia characterized by poor, labored mastication and reduced lingual manipulation for bolus formation and movement. Prolonged but adequate bolus formation and movement. Oral residue (not sensated) with all trials. Concern for excess labor with solids; patient reports dentures, but does not appear to be a reliable historian? Pharyngeal Phase: Moderate pharyngeal stage dysphagia characterized by delayed swallow, decreased laryngeal elevation, and decreased pharyngeal peristalsis. Mild vallecular residue with all trials is cleared with cued repeat swallow. Penetration with thin not completely cleared; residue persists through remainder of study limiting study at times as there was potential penetration of  second administration of nectar after initial thin trial, but potential penetration may have been residual lining laryngeal vestibule. Final administration of nectar thick at the conclusion of the study was taken without penetration. Dysphagia Outcome Severity Scale: Level 3: Moderate dysphagia- Total assisstance, supervision or strategies. Two or more diet consistencies restricted  Penetration-Aspiration Scale (PAS): 3 - Material enters the airway, remains above the vocal folds, and is not ejected from airway    Recommended Diet:  Solid consistency: Dysphagia I Pureed  Liquid consistency: Nectar  Liquid administration via: Cup  Medication administration: Meds in puree  Compensatory Swallowing Strategies: Small bites/sips;Eat/Feed slowly; Total feed;Remain upright for 30-45 minutes after meals;Upright as possible for all oral intake;Swallow 2 times per bite/sip    Recommendations/Treatment  Requires SLP Intervention: Yes  Duration/Frequency of Treatment: ST to tx 3-5 times per week for dysphagia during acute admission  Therapeutic Interventions: Patient/Family education;Diet tolerance monitoring    Education:    Patient Education: Completed on recommendations/plan  Patient Education Response: No evidence of learning    Prognosis  Prognosis for safe diet advancement: guarded  Barriers to reach goals: cognitive deficits    Goals:    Dysphagia Goals: The patient will tolerate recommended diet without observed clinical signs of aspiration; The patient will improve oral preparation phase via bolus control/manipulation exercises to 5/5 each trial.;  The patient/caregiver will demonstrate understanding of compensatory strategies for improved swallowing safety. The patient will improve pharyngeal phase via pharyngolaryngeal exercises to 5/5 each      Oral Preparation / Oral Phase  Oral Phase - Major Contributing Deficits  Poor Mastication: Mechanical soft solid; Soft solid  Weak Lingual Manipulation: All  Reduced Posterior Propulsion: All  Lingual / Palatal Residue: All(requires cue to repeat swallow)    Pharyngeal Phase  Pharyngeal Phase - Major Contributing Deficits  Delayed Swallow Initiation: All  Reduced Pharyngeal Peristalsis: All  Reduced Laryngeal Elevation: All  Deep Penetration During: Thin straw(Penetration with thin not completely cleared; residue persists through remainder of study limiting study at times as there was potential penetration of  second administration of nectar after initial thin trial, but potential penetration may have been residual lining laryngeal vestibule. Final administration of nectar thick at the conclusion of the study was taken without penetration.)  Pharyngeal Residue - Valleculae: All(clears with cued repeat swallow)    Upper Esophageal Phase  Esophageal Screen: (DEBORA)      Therapy Time:   Individual   Time In 1355   Time Out 1435   Minutes 40       Stacey L. Dennie Pale, Amon Gata, #[id number]  Speech-Language Pathologist  4/1/2019, 2:35 PM

## 2019-04-01 NOTE — PROGRESS NOTES
kg)       Physical Exam:  GEN: Appears well, no acute distress  SKIN: Pink, warm, dry. Nails without clubbing. HEENT: PERRLA. Normocephalic, atraumatic. Neck supple. No adenopathy. LUNG: AP diameter normal. Mild crackles bilateral bases. Exp ronchi throughout. HEART: S1S2 A/R. No JVD. No carotid bruit. No murmur, rub or gallop. ABD: Soft, nontender. +BS X 4 quads. No hepatomegaly. EXT: Radial and pedal pulses 2+ and symmetric. Without varicosities. Trace bilateral edema. MUSCSKEL: Good ROM X4 extremities. No deformity. NEURO: A/O X3. Calm and cooperative. Telemetry: A flutter HR 78 with occasional PVC. Medications:    rivaroxaban  15 mg Oral Daily    metoprolol succinate  25 mg Oral Daily    furosemide  20 mg Oral Daily    allopurinol  100 mg Oral Daily    colchicine  0.6 mg Oral Daily    escitalopram  10 mg Oral Daily    pantoprazole  40 mg Oral Daily    potassium chloride  10 mEq Oral Every Other Day    sodium chloride flush  10 mL Intravenous 2 times per day    ampicillin-sulbactam  1.5 g Intravenous Q6H    insulin lispro  0-12 Units Subcutaneous TID WC    insulin lispro  0-6 Units Subcutaneous Nightly      sodium chloride 75 mL/hr at 03/31/19 5227    dextrose       metoprolol, sodium chloride flush, magnesium hydroxide, ondansetron, glucose, dextrose, glucagon (rDNA), dextrose    Lab Data: I have reviewed all labs below today.    CBC:   Recent Labs     03/29/19  1253 03/30/19  0511 04/01/19  0536   WBC 17.8* 16.1* 11.9*   HGB 15.8 12.7 11.5*   HCT 50.4* 40.5 36.7   MCV 85.4 84.5 85.3    249 210     BMP:   Recent Labs     03/30/19  0511 03/31/19  0529 04/01/19  0536    139 139   K 3.7 3.7 3.9    104 105   CO2 24 24 23   BUN 26* 38* 21*   CREATININE 1.6* 1.3* 1.1     GLUCOSE:   Recent Labs     03/30/19  0511 03/31/19  0529 04/01/19  0536   GLUCOSE 133* 135* 146*     LIVER PROFILE:   Lab Results   Component Value Date    AST 23 03/29/2019    ALT 7 03/29/2019 LIPASE 8.0 03/29/2019    LABALBU 3.6 03/29/2019    BILITOT 1.4 03/29/2019    ALKPHOS 121 03/29/2019     PT/INR: No results found for: PROTIME, INR  APTT: No results found for: APTT  Pro-BNP:    Lab Results   Component Value Date    PROBNP 23,576 03/31/2019    PROBNP 47,100 03/29/2019    PROBNP 4,041 01/17/2019     ENZYMES:  Lab Results   Component Value Date    TROPONINI <0.01 03/29/2019     FASTING LIPID PANEL:  Lab Results   Component Value Date    CHOL 112 03/30/2019    HDL 48 03/30/2019    LDLCALC 47 03/30/2019    TRIG 83 03/30/2019       Diagnostics:    EKG: 3/30/19 A flutter HR 96    ECHO: 2/1/19   Summary   Normal LV size and systolic function. Estimated ejection fraction is 60%.  Mild mitral regurgitation is present.   The left atrium is moderately dilated.   Normal right ventricular size and function.   Mild pulmonic regurgitation present. Assessment/Plan:  1.) Chronic A flutter: HR elevated in the setting of hypoxic resp failure. Now stable w/ metoprolol po. CHADSVASC 6.   -Continue metoprolol succinate 25mg po daily  -Continue xarelto 15mg po daily for thromboembolic risk reduction  -Please notify cardiology if patient not able to take po following swallow eval, may changed to IV metoprolol     2.) Hypoxic resp failure: Most likely from PNA- possibly aspiration. Management per hosp team. SOB not from angina- troponin normal. Possible fluid overload contributing with elevated BNP.  Recent ECHO without evidence of HF.  -Continue lasix 20mg po daily        Electronically signed by ROBERTO Dsouza - CNP on 4/1/2019 at 12:14 PM

## 2019-04-02 VITALS
DIASTOLIC BLOOD PRESSURE: 87 MMHG | SYSTOLIC BLOOD PRESSURE: 134 MMHG | BODY MASS INDEX: 23.73 KG/M2 | OXYGEN SATURATION: 96 % | HEIGHT: 62 IN | WEIGHT: 128.97 LBS | RESPIRATION RATE: 18 BRPM | TEMPERATURE: 97.3 F | HEART RATE: 71 BPM

## 2019-04-02 PROBLEM — A41.9 SEPSIS (HCC): Status: RESOLVED | Noted: 2019-03-29 | Resolved: 2019-04-02

## 2019-04-02 LAB
ANION GAP SERPL CALCULATED.3IONS-SCNC: 16 MMOL/L (ref 3–16)
BASOPHILS ABSOLUTE: 0 K/UL (ref 0–0.2)
BASOPHILS RELATIVE PERCENT: 0.3 %
BUN BLDV-MCNC: 32 MG/DL (ref 7–20)
CALCIUM SERPL-MCNC: 9 MG/DL (ref 8.3–10.6)
CHLORIDE BLD-SCNC: 103 MMOL/L (ref 99–110)
CO2: 20 MMOL/L (ref 21–32)
CREAT SERPL-MCNC: 1.2 MG/DL (ref 0.6–1.2)
EOSINOPHILS ABSOLUTE: 0 K/UL (ref 0–0.6)
EOSINOPHILS RELATIVE PERCENT: 0.1 %
GFR AFRICAN AMERICAN: 51
GFR NON-AFRICAN AMERICAN: 42
GLUCOSE BLD-MCNC: 110 MG/DL (ref 70–99)
GLUCOSE BLD-MCNC: 133 MG/DL (ref 70–99)
GLUCOSE BLD-MCNC: 205 MG/DL (ref 70–99)
HCT VFR BLD CALC: 38.2 % (ref 36–48)
HEMOGLOBIN: 12.1 G/DL (ref 12–16)
LYMPHOCYTES ABSOLUTE: 1.8 K/UL (ref 1–5.1)
LYMPHOCYTES RELATIVE PERCENT: 14.8 %
MAGNESIUM: 1.7 MG/DL (ref 1.8–2.4)
MCH RBC QN AUTO: 27.2 PG (ref 26–34)
MCHC RBC AUTO-ENTMCNC: 31.7 G/DL (ref 31–36)
MCV RBC AUTO: 86 FL (ref 80–100)
MONOCYTES ABSOLUTE: 1.2 K/UL (ref 0–1.3)
MONOCYTES RELATIVE PERCENT: 9.2 %
NEUTROPHILS ABSOLUTE: 9.5 K/UL (ref 1.7–7.7)
NEUTROPHILS RELATIVE PERCENT: 75.6 %
PDW BLD-RTO: 19 % (ref 12.4–15.4)
PERFORMED ON: ABNORMAL
PERFORMED ON: ABNORMAL
PLATELET # BLD: 233 K/UL (ref 135–450)
PMV BLD AUTO: 9.6 FL (ref 5–10.5)
POTASSIUM REFLEX MAGNESIUM: 4.1 MMOL/L (ref 3.5–5.1)
POTASSIUM SERPL-SCNC: 4.1 MMOL/L (ref 3.5–5.1)
PRO-BNP: ABNORMAL PG/ML (ref 0–449)
PROCALCITONIN: 0.63 NG/ML (ref 0–0.15)
RBC # BLD: 4.45 M/UL (ref 4–5.2)
SODIUM BLD-SCNC: 139 MMOL/L (ref 136–145)
WBC # BLD: 12.5 K/UL (ref 4–11)

## 2019-04-02 PROCEDURE — 94761 N-INVAS EAR/PLS OXIMETRY MLT: CPT

## 2019-04-02 PROCEDURE — 83880 ASSAY OF NATRIURETIC PEPTIDE: CPT

## 2019-04-02 PROCEDURE — 92526 ORAL FUNCTION THERAPY: CPT

## 2019-04-02 PROCEDURE — 6370000000 HC RX 637 (ALT 250 FOR IP): Performed by: INTERNAL MEDICINE

## 2019-04-02 PROCEDURE — 6360000002 HC RX W HCPCS: Performed by: INTERNAL MEDICINE

## 2019-04-02 PROCEDURE — 84145 PROCALCITONIN (PCT): CPT

## 2019-04-02 PROCEDURE — 36415 COLL VENOUS BLD VENIPUNCTURE: CPT

## 2019-04-02 PROCEDURE — 97530 THERAPEUTIC ACTIVITIES: CPT

## 2019-04-02 PROCEDURE — 2700000000 HC OXYGEN THERAPY PER DAY

## 2019-04-02 PROCEDURE — 6360000002 HC RX W HCPCS: Performed by: HOSPITALIST

## 2019-04-02 PROCEDURE — 85025 COMPLETE CBC W/AUTO DIFF WBC: CPT

## 2019-04-02 PROCEDURE — 97127 HC SP THER IVNTJ W/FOCUS COG FUNCJ: CPT

## 2019-04-02 PROCEDURE — 6370000000 HC RX 637 (ALT 250 FOR IP): Performed by: PHYSICIAN ASSISTANT

## 2019-04-02 PROCEDURE — 2580000003 HC RX 258: Performed by: INTERNAL MEDICINE

## 2019-04-02 PROCEDURE — 83735 ASSAY OF MAGNESIUM: CPT

## 2019-04-02 PROCEDURE — 80048 BASIC METABOLIC PNL TOTAL CA: CPT

## 2019-04-02 PROCEDURE — 99232 SBSQ HOSP IP/OBS MODERATE 35: CPT | Performed by: NURSE PRACTITIONER

## 2019-04-02 RX ORDER — METRONIDAZOLE 500 MG/1
500 TABLET ORAL 3 TIMES DAILY
Qty: 9 TABLET | Refills: 0 | Status: SHIPPED | OUTPATIENT
Start: 2019-04-02 | End: 2019-04-05

## 2019-04-02 RX ORDER — MAGNESIUM SULFATE IN WATER 40 MG/ML
2 INJECTION, SOLUTION INTRAVENOUS ONCE
Status: COMPLETED | OUTPATIENT
Start: 2019-04-02 | End: 2019-04-02

## 2019-04-02 RX ORDER — METOPROLOL SUCCINATE 25 MG/1
25 TABLET, EXTENDED RELEASE ORAL DAILY
Qty: 30 TABLET | Refills: 3 | Status: ON HOLD | OUTPATIENT
Start: 2019-04-03 | End: 2019-04-26 | Stop reason: SDUPTHER

## 2019-04-02 RX ORDER — CEFPODOXIME PROXETIL 200 MG/1
200 TABLET, FILM COATED ORAL DAILY
Qty: 3 TABLET | Refills: 0 | Status: SHIPPED | OUTPATIENT
Start: 2019-04-02 | End: 2019-04-05

## 2019-04-02 RX ADMIN — METOPROLOL SUCCINATE 25 MG: 25 TABLET, EXTENDED RELEASE ORAL at 08:52

## 2019-04-02 RX ADMIN — AMPICILLIN SODIUM AND SULBACTAM SODIUM 1.5 G: 1; .5 INJECTION, POWDER, FOR SOLUTION INTRAMUSCULAR; INTRAVENOUS at 09:59

## 2019-04-02 RX ADMIN — ALLOPURINOL 100 MG: 100 TABLET ORAL at 08:51

## 2019-04-02 RX ADMIN — PANTOPRAZOLE SODIUM 40 MG: 40 TABLET, DELAYED RELEASE ORAL at 08:52

## 2019-04-02 RX ADMIN — COLCHICINE 0.6 MG: 0.6 TABLET, FILM COATED ORAL at 08:51

## 2019-04-02 RX ADMIN — MAGNESIUM SULFATE HEPTAHYDRATE 2 G: 40 INJECTION, SOLUTION INTRAVENOUS at 10:44

## 2019-04-02 RX ADMIN — INSULIN LISPRO 4 UNITS: 100 INJECTION, SOLUTION INTRAVENOUS; SUBCUTANEOUS at 12:13

## 2019-04-02 RX ADMIN — AMPICILLIN SODIUM AND SULBACTAM SODIUM 1.5 G: 1; .5 INJECTION, POWDER, FOR SOLUTION INTRAMUSCULAR; INTRAVENOUS at 03:49

## 2019-04-02 RX ADMIN — ESCITALOPRAM OXALATE 10 MG: 10 TABLET ORAL at 08:52

## 2019-04-02 RX ADMIN — Medication 10 ML: at 08:54

## 2019-04-02 ASSESSMENT — PAIN SCALES - PAIN ASSESSMENT IN ADVANCED DEMENTIA (PAINAD)
BREATHING: 0
BODYLANGUAGE: 0
FACIALEXPRESSION: 0
NEGVOCALIZATION: 0
CONSOLABILITY: 0
TOTALSCORE: 0

## 2019-04-02 ASSESSMENT — PAIN SCALES - GENERAL
PAINLEVEL_OUTOF10: 0
PAINLEVEL_OUTOF10: 0

## 2019-04-02 NOTE — DISCHARGE SUMMARY
Hospitalist Discharge Summary    Patient ID:  Ruddy Wiley  [id number]  34 y.o.  9/2/1929    Admit date: 3/29/2019    Discharge date: 4/2/2019    Disposition: home    Admission Diagnoses:   Patient Active Problem List   Diagnosis    Primary osteoarthritis involving multiple joints    History of breast cancer    Chronic midline low back pain without sciatica    Essential hypertension    Cataract    Lumbar spondylosis    Spinal stenosis, lumbar    Gouty arthropathy    Hypercholesterolemia    CKD (chronic kidney disease) stage 3, GFR 30-59 ml/min ([facility])    Folate deficiency    Vitamin B12 deficiency    Dementia without behavioral disturbance    Esophageal foreign body    Prediabetes    CAP (community acquired pneumonia) due to Chlamydia species    CHF (congestive heart failure), NYHA class I, acute on chronic, combined ([address]    Typical atrial flutter ([address]    Aspiration pneumonia ([address]       Discharge Diagnoses: Active Problems:    Dementia without behavioral disturbance    CAP (community acquired pneumonia) due to Chlamydia species    CHF (congestive heart failure), NYHA class I, acute on chronic, combined (HCC)    Typical atrial flutter (HCC)    Aspiration pneumonia ([address]  Resolved Problems:    Sepsis ([address]    Septicemia ([address]      Code Status:  Full Code    Condition:  Stable    Discharge Diet: Diet:  DIET DYSPHAGIA I PUREED; Low Sodium (2 GM); Dysphagia I Pureed; Nectar Thick    PCP to do list:        Hospital Course:     Patient presented to the hospital with nausea and vomiting. She was found to have acute hypoxic respiratory failure secondary to aspiration pneumonia. She was seen by speech therapy. She was treated with IV antibiotics. She is being discharged to follow-up with her primary care as an outpatient. Acute hypoxic respiratory failure secondary to possible aspiration pneumonia  Due to dysphagia with esophageal strictures and emesis.    Cont Unasyn IV 3 days. PO Abx for total of 7 days   MBS reviewed   Blood Cx is NTD        Sepsis POA - due to above.         Lactic Acidosis - suspect due to above and also hypxia. Resolving 8 down to 1.9.         DEVON -   Resolved            Afib RVR -   Went into afib 3/29,   Mike GARCIA         History of esophageal strictures requiring dilatation in December 2018 by Dr. Gudelia Owens. GI F/U         Chronic stable gout on allopurinol and colchicine.  No evidence of joint pain at this time.        History of CAD, no chest pain at this time, continue aspirin.         Discharge Medications:   Discharge Medication List as of 4/2/2019  2:49 PM      START taking these medications    Details   metoprolol succinate (TOPROL XL) 25 MG extended release tablet Take 1 tablet by mouth daily, Disp-30 tablet, R-3Normal      rivaroxaban (XARELTO) 15 MG TABS tablet Take 1 tablet by mouth daily, Disp-90 tablet, R-0Normal      cefpodoxime (VANTIN) 200 MG tablet Take 1 tablet by mouth daily for 3 days, Disp-3 tablet, R-0Normal      metroNIDAZOLE (FLAGYL) 500 MG tablet Take 1 tablet by mouth 3 times daily for 3 days, Disp-9 tablet, R-0Normal           Discharge Medication List as of 4/2/2019  2:49 PM        Discharge Medication List as of 4/2/2019  2:49 PM      CONTINUE these medications which have NOT CHANGED    Details   potassium chloride (KLOR-CON M) 10 MEQ extended release tablet Take 1 tablet by mouth every other day, Disp-45 tablet, R-1Normal      furosemide (LASIX) 20 MG tablet Take 1 tablet by mouth every other day, Disp-45 tablet, [id number]      folic acid (FOLVITE) 1 MG tablet TAKE 5 TABLETS (TOTAL 5MG) BY MOUTH DAILY, Disp-150 tablet, R-3Normal      allopurinol (ZYLOPRIM) 100 MG tablet TAKE 0.5 TABLETS BY MOUTH DAILY FOR FOUR WEEKS AND THEN INCREASE TO 1 TABLET DAILY, Disp-30 tablet, R-2Normal      COLCRYS 0.6 MG tablet TAKE ONE TABLET BY MOUTH DAILY, Disp-30 tablet, R-2Normal      pantoprazole (PROTONIX) 40 MG tablet Take 1 tablet by mouth daily, Disp-30 tablet, R-6Normal      escitalopram (LEXAPRO) 10 MG tablet TAKE ONE TABLET BY MOUTH DAILY, Disp-30 tablet, R-5Normal      Cyanocobalamin 1000 MCG/ML KIT Inject 1,000 mcg weekly for 4 weeks, then monthly afterward, Disp-4 kit, R-3Normal      aspirin 81 MG tablet Take 81 mg by mouth every other day Historical Med           Discharge Medication List as of 4/2/2019  2:49 PM      STOP taking these medications       predniSONE (DELTASONE) 5 MG tablet Comments:   Reason for Stopping:                   Procedures:     Assessment on Discharge: Stable, improved     Discharge ROS:  A complete review of systems was asked and negative. Discharge Exam:  /87   Pulse 71   Temp 97.3 °F (36.3 °C) (Oral)   Resp 18   Ht 5' 2\" (1.575 m)   Wt 128 lb 15.5 oz (58.5 kg)   SpO2 96%   BMI 23.59 kg/m²     Gen: NAD  HEENT: NC/AT, moist mucous membranes, no oropharyngeal erythema or exudate  Neck: supple, trachea midline, no anterior cervical or SC LAD  Heart:  Normal s1/s2, RRR, no murmurs, gallops, or rubs. no leg edema  Lungs:  CTA bilaterally, no wheeze,no rales or rhonchi, no use of accessory muscles  Abd: bowel sounds present, soft, nontender, nondistended, no masses  Extrem:  No clubbing, cyanosis,  no edema  Skin: no lesion or masses  Psych:  A & O x3  Neuro: grossly intact, moves all four extremities    Pertinent Studies During Hospital Stay:  Radiology:  Ct Chest Wo Contrast    Result Date: 3/29/2019  EXAMINATION: CT OF THE CHEST WITHOUT CONTRAST 3/29/2019 2:55 pm TECHNIQUE: CT of the chest was performed without the administration of intravenous contrast. Multiplanar reformatted images are provided for review. Dose modulation, iterative reconstruction, and/or weight based adjustment of the mA/kV was utilized to reduce the radiation dose to as low as reasonably achievable. COMPARISON: Chest radiograph same date and CT abdomen and pelvis October 2, 2012. No prior chest CT.  HISTORY: ORDERING SYSTEM PROVIDED HISTORY: sob TECHNOLOGIST PROVIDED HISTORY: Ordering Physician Provided Reason for Exam: sob Acuity: Acute Type of Exam: Initial FINDINGS: Mediastinum: There is no evidence of mediastinal or hilar lymphadenopathy. There are coronary artery calcifications and calcification of the mitral annulus. The heart, pericardium, and great vessels are without acute process on noncontrast exam.  There is a left subclavian port with tip at the cavoatrial junction. Lungs/pleura: The central airways are patent. Some secretions are seen within the left lower lobe airways. There is calcification of the tracheal cartilage. There is mild diffuse bilateral interstitial and septal thickening. Patchy ground-glass opacities are seen within both lower lobes. There is also patchy ground-glass opacities with left upper lobe. Some these areas are somewhat nodular in appearance. There is more dense consolidation in both lower lobes. No new thorax or pleural effusion identified. Upper Abdomen: There is a large hiatal hernia. Visualized upper abdomen is without acute process. Soft Tissues/Bones: Spinal catheter is seen. No acute osseous abnormality or suspicious osseous lesion. Bilateral airspace disease. Findings likely represent a combination of mild edema and pneumonia. Some of these areas are somewhat nodular in appearance. Follow-up to resolution recommended to exclude underlying lesion. There is mucous plugging within the left lower lobe airways. Large hiatal hernia. Xr Chest Portable    Result Date: 3/29/2019  EXAMINATION: SINGLE XRAY VIEW OF THE CHEST 3/29/2019 12:56 pm COMPARISON: 01/21/2019 radiograph HISTORY: ORDERING SYSTEM PROVIDED HISTORY: hypoxia, concern for aspiration pna? TECHNOLOGIST PROVIDED HISTORY: Reason for exam:->hypoxia, concern for aspiration pna? Ordering Physician Provided Reason for Exam: hypoxia, concern for aspiration pna?  Acuity: Acute Type of Exam: Initial Relevant Medical/Surgical History: Emesis N&V started last night. per EMS family states possible UTI FINDINGS: Left port catheter stable. The heart is mildly enlarged. Mediastinum is normal increasing pulmonary vascular congestion is observed centrally. The lungs are otherwise clear with stable atelectasis versus fibrotic change at the left costophrenic sulcus. Stimulator wires are seen in the thoracic spine. No acute skeletal finding. Cardiomegaly with mild pulmonary vascular congestion. No other significant findings in the chest.     Fluoroscopy Modified Barium Swallow With Video    Result Date: 4/1/2019  EXAMINATION: MODIFIED BARIUM SWALLOW WAS PERFORMED IN CONJUNCTION WITH SPEECH PATHOLOGY SERVICES TECHNIQUE: Fluoroscopic evaluation of the swallowing mechanism was performed with multiple consistency of barium product. FLUOROSCOPY DOSE AND TYPE OR TIME AND EXPOSURES: 4.7 minutes fluoroscopy, 3 fluoroscopic images COMPARISON: 11/12/2018 HISTORY: ORDERING SYSTEM PROVIDED HISTORY: Dysphagia. TECHNOLOGIST PROVIDED HISTORY: Reason for exam:->Dysphagia. Ordering Physician Provided Reason for Exam: dysphagia and cough Acuity: Acute Type of Exam: Ongoing FINDINGS: Aspiration was seen with thin liquid. Aspiration was seen with residue of mixed consistency. Trials of honey and nectar consistency at the end of the examination were tolerated. Aspiration, notably of thin liquid. Please see separate speech pathology report for full discussion of findings and recommendations.            Last Labs on Discharge:     Recent Results (from the past 24 hour(s))   POCT Glucose    Collection Time: 04/01/19  5:05 PM   Result Value Ref Range    POC Glucose 160 (H) 70 - 99 mg/dl    Performed on ACCU-CHEK    POCT Glucose    Collection Time: 04/01/19  8:48 PM   Result Value Ref Range    POC Glucose 166 (H) 70 - 99 mg/dl    Performed on ACCU-CHEK    Basic Metabolic Panel    Collection Time: 04/02/19  5:32 AM   Result Value Ref Range    Sodium 139 136 - 145 mmol/L    Potassium 4.1 3.5 - 5.1 mmol/L    Chloride 103 99 - 110 mmol/L    CO2 20 (L) 21 - 32 mmol/L    Anion Gap 16 3 - 16    Glucose 133 (H) 70 - 99 mg/dL    BUN 32 (H) 7 - 20 mg/dL    CREATININE 1.2 0.6 - 1.2 mg/dL    GFR Non-African American 42 (A) >60    GFR  51 (A) >60    Calcium 9.0 8.3 - 10.6 mg/dL   Magnesium    Collection Time: 04/02/19  5:32 AM   Result Value Ref Range    Magnesium 1.70 (L) 1.80 - 2.40 mg/dL   Brain Natriuretic Peptide    Collection Time: 04/02/19  5:32 AM   Result Value Ref Range    Pro-BNP 41,387 (H) 0 - 449 pg/mL   Basic Metabolic Panel w/ Reflex to MG    Collection Time: 04/02/19  5:32 AM   Result Value Ref Range    Potassium reflex Magnesium 4.1 3.5 - 5.1 mmol/L   CBC Auto Differential    Collection Time: 04/02/19  5:32 AM   Result Value Ref Range    WBC 12.5 (H) 4.0 - 11.0 K/uL    RBC 4.45 4.00 - 5.20 M/uL    Hemoglobin 12.1 12.0 - 16.0 g/dL    Hematocrit 38.2 36.0 - 48.0 %    MCV 86.0 80.0 - 100.0 fL    MCH 27.2 26.0 - 34.0 pg    MCHC 31.7 31.0 - 36.0 g/dL    RDW 19.0 (H) 12.4 - 15.4 %    Platelets 346 132 - 877 K/uL    MPV 9.6 5.0 - 10.5 fL    Neutrophils % 75.6 %    Lymphocytes % 14.8 %    Monocytes % 9.2 %    Eosinophils % 0.1 %    Basophils % 0.3 %    Neutrophils # 9.5 (H) 1.7 - 7.7 K/uL    Lymphocytes # 1.8 1.0 - 5.1 K/uL    Monocytes # 1.2 0.0 - 1.3 K/uL    Eosinophils # 0.0 0.0 - 0.6 K/uL    Basophils # 0.0 0.0 - 0.2 K/uL   Procalcitonin    Collection Time: 04/02/19  5:32 AM   Result Value Ref Range    Procalcitonin 0.63 (H) 0.00 - 0.15 ng/mL   POCT Glucose    Collection Time: 04/02/19  7:41 AM   Result Value Ref Range    POC Glucose 110 (H) 70 - 99 mg/dl    Performed on ACCU-CHEK    POCT Glucose    Collection Time: 04/02/19 11:21 AM   Result Value Ref Range    POC Glucose 205 (H) 70 - 99 mg/dl    Performed on ACCU-CHEK          Follow up: with Warden Maura MD    Note that over 30 minutes was spent in preparing discharge papers, discussing discharge with patient, medication review, etc.    Thank you Johanna Blevins MD for the opportunity to be involved in this patient's care. If you have any questions or concerns please feel free to contact me at [phone].     Electronically signed by Issac Perez MD on 4/2/2019 at 4:42 PM

## 2019-04-02 NOTE — PROGRESS NOTES
Physical Therapy  Facility/Department: [facility] 5W PROGRESSIVE CARE  Daily Treatment Note  This note also serves as a D/C Summary in the event that this patient is discharged prior to the next therapy session. NAME: Cb Ferrari  : 1929  MRN: [id number]    Date of Service: 2019    Discharge Recommendations:  Patient would benefit from continued therapy after discharge, 3-5 sessions per week   PT Equipment Recommendations  Other: will continue to assess    Patient Diagnosis(es): The primary encounter diagnosis was Septicemia [facility]). Diagnoses of Acute respiratory failure with hypoxia ([facility] Pneumonia due to organism, and Acute pulmonary edema ([facility] were also pertinent to this visit. has a past medical history of Cataract, Chronic midline low back pain without sciatica, Essential hypertension, GERD (gastroesophageal reflux disease), Gouty arthropathy, History of breast cancer, Hypercholesterolemia, Incontinence, Lumbar spondylosis, Osteoporosis of multiple sites, Primary osteoarthritis involving multiple joints, Spinal stenosis, lumbar, and Type 2 diabetes mellitus without complication, without long-term current use of insulin ([facility] has a past surgical history that includes Mastectomy; Cataract removal (2012); Cataract removal (2012); Cholecystectomy; Upper gastrointestinal endoscopy; Upper gastrointestinal endoscopy (N/A, 2018); Esophagus dilation (N/A, 2018); Upper gastrointestinal endoscopy (2018); Upper gastrointestinal endoscopy (N/A, 2018); and Esophagus dilation (N/A, 2018). Restrictions  Restrictions/Precautions  Restrictions/Precautions: Fall Risk  Position Activity Restriction  Other position/activity restrictions: O2 NC     Subjective   General  Chart Reviewed:  Yes  Additional Pertinent Hx: pt is an 81 yo female who was adm to hosp with SOB and vomiting; current hospital problems:  hypoxic respiratory failure secondary to possible PNA and mobility  Prognosis: Fair  Patient Education: role and purpose of PT; reviewed call light and not getting up without assistance  REQUIRES PT FOLLOW UP: Yes  Activity Tolerance  Activity Tolerance: Patient limited by fatigue;Patient limited by cognitive status     Goals  Short term goals  Time Frame for Short term goals: by discharge  Short term goal 1: bed mob min A  Short term goal 2: transfers Min A  Short term goal 3: progress to gait when able  Patient Goals   Patient goals : pt unable to state a goal    Plan    Plan  Times per week: 3-5  Current Treatment Recommendations: Functional Mobility Training  Safety Devices  Type of devices:  All fall risk precautions in place, Call light within reach, Chair alarm in place, Gait belt, Patient at risk for falls, Left in chair     Therapy Time   Individual Concurrent Group Co-treatment   Time In 1015         Time Out 1045         Minutes 30         Timed Code Treatment Minutes: [address] Carlson, PT

## 2019-04-02 NOTE — PROGRESS NOTES
Pt given morning medications in chocolate pudding and thickened apple juice. Pt swallowed without difficulty but did have coughing when finished. The coughing did not occur with every bite. Cough is moist no sputum noted. Will monitor.

## 2019-04-02 NOTE — CARE COORDINATION
DISCHARGE SUMMARY     DATE OF DISCHARGE: 4/2/19    DISCHARGE DESTINATION: [address]    Level of Care: Skilled    Report Number: [phone]  Fax Number:  [phone]    Hens completed    TRANSPORTATION: "[facility] [facility]" Critical access hospital Name:  [address] [place] [address] up Time: 2:00pm    Phone Number: [phone]    COMMENTS: Patient's daughter has decided that her mother is not safe to return home d/t her current level of functioning. [facility] can accept. Sanjuana Cooper at  is aware of d/c time.      Edwin Burger, [address] [facility]  [phone]  4/2/19 at 11:20 AM

## 2019-04-02 NOTE — PROGRESS NOTES
[facility]   Speech Therapy  Daily Dysphagia Treatment Note        Lanny Dwyer  AGE: 80 y.o. GENDER: female  : 1929  [id number]  EPISODE DATE:  3/29/2019  Patient Active Problem List   Diagnosis    Primary osteoarthritis involving multiple joints    History of breast cancer    Chronic midline low back pain without sciatica    Essential hypertension    Cataract    Lumbar spondylosis    Spinal stenosis, lumbar    Gouty arthropathy    Hypercholesterolemia    CKD (chronic kidney disease) stage 3, GFR 30-59 ml/min ([facility])    Folate deficiency    Vitamin B12 deficiency    Dementia without behavioral disturbance    Esophageal foreign body    Prediabetes    CAP (community acquired pneumonia) due to Chlamydia species    CHF (congestive heart failure), NYHA class I, acute on chronic, combined ([address]    Typical atrial flutter (HCC)    Aspiration pneumonia ([address]     No Known Allergies  Treatment Diagnosis: Dysphagia       Chart review:     3/29/2019 Chest CT:  Impression   Bilateral airspace disease.  Findings likely represent a combination of mild   edema and pneumonia.  Some of these areas are somewhat nodular in appearance. Follow-up to resolution recommended to exclude underlying lesion.       There is mucous plugging within the left lower lobe airways.       Large hiatal hernia.       3/31/2019 Internal Medicine:  Hx of esophageal stricture s/p dilation in Dec.  Had emesis, then SOB and cough prior to admission - suspected aspiration event. Admitted with aspiration PNA secondary to dysphagia. Pt has fairly advanced dementia. She denies any complaint today. She denies repeat nausea or vomiting. No pain.              Subjective:     3/29/2019  Current Diet : NPO  Current Liquid Diet : Full  Patient Currently in Pain: Denies    Current diet:  Pureed diet with nectar thickened liquids. Comments regarding tolerating Current Diet: Pt had no comments.   Pt's daughter stated that the pt tends to hold all consistencies in her mouth during a meal.        Objective:     Pain   Patient Currently in Pain: Denies    Cognitive/Behavior   Behavior/Cognition: Alert, Cooperative, Pleasant mood, Confused, Doesn't follow directions    Presentations   Consistencies Administered: Nectar thick water via straw. Positioning:  Upright in chair. Dysphagia Tx: The focus of this sessions was to determine diet/liquid tolerance. Goals:   Dysphagia Goals:   1. The patient will tolerate recommended diet without observed clinical signs of aspiration. --ongoing. Today, the pt only accepted nectar thickened water through a straw. She tolerated this fairly well with only one instance of coughing after the presentation. When this SLP first entered the room, pt's tongue was coated with yogurt. Daughter stated she believes the pt holds material in her mouth and requires cueing to swallow. 2. The patient will improve oral preparation phase via bolus control/manipulation exercises to 5/5 each trial.--not met. The pt was not able to follow directions for these exercises 2/2 dementia. 3. The patient/caregiver will demonstrate understanding of compensatory strategies for improved swallowing safety. --ongoing. 4.(The patient will improve pharygnela phase via pharygnolarygnela exercisesto 5/5 each)--not met. The pt was unable to follow directions for these exercises.     Assessment:   Impressions:   Dysphagia Diagnosis: Moderate oral stage dysphagia, Moderate pharyngeal stage dysphagia     Diet Recommendations:  Solid consistency: Dysphagia I Pureed   Liquid consistency: Nectar   Medication administration: Meds in puree   Recommended Form of Meds: Crushed in puree as able    Strategies:   Compensatory Swallowing Strategies: Small bites/sips, Eat/Feed slowly, Total feed, Remain upright for 30-45 minutes after meals, Upright as possible for all oral intake, Swallow 2 times per bite/sip    Education:  Consulted and agree with results and recommendations: Patient, Daughter  Patient Education: Completed on recommendations/plan  Patient Education Response: No evidence of learning    Prognosis for Dysphagia:   Good with the current diet. Not likely the pt will be upgraded. Plan:     Continue Dysphagia Therapy: Yes  Interventions: Therapeutic Interventions: Patient/Family education, Diet tolerance monitoring  Duration/Frequency of therapy while on unit: Duration/Frequency of Treatment  Duration/Frequency of Treatment: ST to tx 3-5 times per week for dysphagia during acute admission  Discharge Instructions:   Anticipate Yes__X__No__ for further skilled Speech Therapy for Dysphagia at discharge    This note serves as a D/C Summary in the event that this patient is discharged prior to the next therapy session.     Coded treatment time:  15 minutes  Total treatment time: 30 minutes    Electronically signed by Tree De Santiago M.A./CCC-SLP #[id number] on 4/2/2019 at 12:07 PM

## 2019-04-02 NOTE — DISCHARGE INSTR - COC
Continuity of Care Form    Patient Name: Karlene Arguello   :  1929  MRN:  [id number]    [facility] date:  3/29/2019  Discharge date:  19    Code Status Order: Full Code   Advance Directives:   Advance Care Flowsheet Documentation     Date/Time Healthcare Directive Type of Healthcare Directive Copy in [address] Agent's Name Healthcare Agent's Phone Number    19 1005  Yes, patient has an advance directive for healthcare treatment  Durable power of  for health care;Living will  Yes, copy in chart HCPOA in [facility], asked daughter to bring [facility]  on file    19 1906  Yes, patient has an advance directive for healthcare treatment  Durable power of  for health care;Living will  No, copy requested from family  --  --  --    19 1839  Unknown, patient unable to respond due to medical condition  --  --  --  --  --          Admitting Physician:  Ann Trivedi MD  PCP: Fly Alaniz MD    Discharging Nurse: [address] Unit/Room#: L1R-7932/5127-01  Discharging Unit Phone Number: [phone]    Emergency Contact:   Extended Emergency Contact Information  Primary Emergency Contact: Morena Rodriguez of [address] [place] [address] Phone: [phone]  Mobile Phone: [phone]  Relation: Child  Secondary Emergency Contact: Clarisa Mohr  Address: [address] [place] [address] Phone: [phone]  Mobile Phone: [phone]  Relation: Spouse    Past Surgical History:  Past Surgical History:   Procedure Laterality Date    CATARACT REMOVAL  2012    dr Barbie Vyas left    CATARACT REMOVAL  2012    dr Barbie Vyas  right   [address] [place] [address] Drive N/A 2018    [facility] performed by Jin Pleitez MD at [facility] 2018 ESOPHAGEAL DILATION CARLOS performed by Grady Posey MD at [facility]    UPPER GASTROINTESTINAL ENDOSCOPY      UPPER GASTROINTESTINAL ENDOSCOPY N/A 12/6/2018    EGD ESOPHAGOGASTRODUODENOSCOPY performed by Grady Posey MD at [address] [facility]  12/13/2018    with 40 Lomas dil    UPPER GASTROINTESTINAL ENDOSCOPY N/A 12/13/2018    EGD DIAGNOSTIC ONLY performed by Grady Posey MD at [facility] ENDOSCOPY       Immunization History:   Immunization History   Administered Date(s) Administered    Pneumococcal 13-valent Conjugate Yessica Oats) 01/26/2017    Tdap (Boostrix, Adacel) 07/14/2014       Active Problems:  Patient Active Problem List   Diagnosis Code    Primary osteoarthritis involving multiple joints M15.0    History of breast cancer Z85.3    Chronic midline low back pain without sciatica M54.5, G89.29    Essential hypertension I10    Cataract H26.9    Lumbar spondylosis M47.816    Spinal stenosis, lumbar M48.061    Gouty arthropathy M10.9    Hypercholesterolemia E78.00    CKD (chronic kidney disease) stage 3, GFR 30-59 ml/min ([facility]) N18.3    Folate deficiency E53.8    Vitamin B12 deficiency E53.8    Dementia without behavioral disturbance F03.90    Esophageal foreign body T18.108A    Prediabetes R73.03    CAP (community acquired pneumonia) due to Chlamydia species J16.0    CHF (congestive heart failure), NYHA class I, acute on chronic, combined ([facility]) I50.43    Typical atrial flutter ([facility]) I48.3    Aspiration pneumonia ([facility]) J69.0       Isolation/Infection:   Isolation          No Isolation            Nurse Assessment:  Last Vital Signs: /88   Pulse 78   Temp 97.8 °F (36.6 °C) (Oral)   Resp 18   Ht 5' 2\" (1.575 m)   Wt 128 lb 15.5 oz (58.5 kg)   SpO2 99%   BMI 23.59 kg/m²     Last documented pain score (0-10 scale): Pain Level: 0  Last Weight:   Wt Readings from Last 1 Encounters:   04/02/19 128 lb 15.5 oz (58.5 kg)     Mental Status:  alert and to person only    IV Access:  - None    Nursing Mobility/ADLs:  Walking   Dependent, we are using the stedy  Transfer  Assisted  Bathing  Dependent  Dressing  Dependent  Toileting  Assisted  Feeding  Assisted  Med Admin  Assisted  Med Delivery   crushed and prefers mixed with chocolate pudding    Wound Care Documentation and Therapy:  Wound 06/13/16 Other (Comment) Buttocks Right;Mid; Inner pressure ulcer stage 2 (Active)   Number of days: 1022        Elimination:  Continence:   · Bowel: Yes  · Bladder: Yes  Urinary Catheter: None   Colostomy/Ileostomy/Ileal Conduit: No       Date of Last BM: 4/2/19    Intake/Output Summary (Last 24 hours) at 4/2/2019 0915  Last data filed at 4/2/2019 0531  Gross per 24 hour   Intake 1350 ml   Output 525 ml   Net 825 ml     I/O last 3 completed shifts: In: 5444 [P.O.:240; I.V.:910; IV Piggyback:200]  Out: 525 [Urine:525]    Safety Concerns: At Risk for Falls and Aspiration Risk    Impairments/Disabilities:      cognitively impaired, history of dementia/alzheimers    Nutrition Therapy:  Current Nutrition Therapy:   - Oral Diet:  Low Sodium (3-4gm)    Routes of Feeding: Oral, pureed food with nectar thick liquids  Liquids: Nectar Thick Liquids  Daily Fluid Restriction: no  Last Modified Barium Swallow with Video (Video Swallowing Test): done on 4/1/19/15065784    Treatments at the Time of Hospital Discharge:   Respiratory Treatments:   Oxygen Therapy:  is not on home oxygen therapy. Ventilator:    - No ventilator support     Heart Failure Instructions:  Patient is at risk for heart failure. Please review the following:    Please weigh daily (same scale, same time of day)  Please report weight increase of 3 pounds/one day or 5 pounds /one week to facility MD.    Please use hospital discharge weight as baseline reference.      Please monitor for s/sx of worsening HF : sudden weight gain, SOB, LE edema, abdominal distention, inability to lie flat, intolerance to usual activity, cough (especially at night)  Please report these findings even if no increase is noted on scale. Please continue LOW SODIUM diet and LIMIT FLUIDS to 48-64 oz /day    Please call PCP and /or [facility] ([phone]) with any questions or concerns. Please have facility MD complete required 7 day follow up. Rehab Therapies: Physical Therapy, Occupational Therapy and Speech/Language Therapy  Weight Bearing Status/Restrictions: No weight bearing restirctions  Other Medical Equipment (for information only, NOT a DME order):  wheelchair, walker and hospital bed  Other Treatments: keep heels elevated off bed and turn and reposition pt every 2 hours while in bed    Patient's personal belongings (please select all that are sent with patient):  None    RN SIGNATURE:  Electronically signed by Mumtaz Nunez RN on 4/2/19 at 11:58 AM    CASE MANAGEMENT/SOCIAL WORK SECTION    Inpatient Status Date: 3/29/19    Readmission Risk Assessment Score:  Readmission Risk              Risk of Unplanned Readmission:        15           Discharging to Facility/ Agency   · Name: [facility]  · Address:  · Phone: [phone]  · Fax: [phone]    Dialysis Facility (if applicable)   · Name:  · Address:  · Dialysis Schedule:  · Phone:  · Fax:    / signature:  Electronically signed by WHITNEY Rowley, RAYMONDW on 4/2/19 at 11:19 AM      PHYSICIAN SECTION    Prognosis: Good    Condition at Discharge: Stable    Rehab Potential (if transferring to Rehab): Fair    Recommended Labs or Other Treatments After Discharge:   PT   OT   SLP    Physician Certification: I certify the above information and transfer of Mariah Juárez  is necessary for the continuing treatment of the diagnosis listed and that she requires SNF for less 30 days.      Update Admission H&P: No change in H&P    PHYSICIAN SIGNATURE:  Electronically signed by Ren Olivares MD on 4/2/19 at 9:15 AM

## 2019-04-02 NOTE — PROGRESS NOTES
Pharmacy Heart Failure Medication Reconciliation Note    Pt discharged from [facility] today after admission for respiratory failure. Tammi Andujar has a concern for diastolic heart failure (last EF = 60% on 2/1/19).     Patient taking an ACEI / ARB / Entresto:  No: HFpEF      Patient taking a BETA BLOCKER:  Yes  Patient taking a LOOP DIURETIC: Yes  Patient taking a ALDOSTERONE RECEPTOR ANTAGONIST: No: HFpEF     Corrections to discharge medications include:  none    Discharge Medications:         Medication List        START taking these medications      cefpodoxime 200 MG tablet  Commonly known as:  VANTIN  Take 1 tablet by mouth daily for 3 days     metoprolol succinate 25 MG extended release tablet  Commonly known as:  TOPROL XL  Take 1 tablet by mouth daily  Start taking on:  4/3/2019     metroNIDAZOLE 500 MG tablet  Commonly known as:  FLAGYL  Take 1 tablet by mouth 3 times daily for 3 days     rivaroxaban 15 MG Tabs tablet  Commonly known as:  XARELTO  Take 1 tablet by mouth daily            CONTINUE taking these medications      allopurinol 100 MG tablet  Commonly known as:  ZYLOPRIM  TAKE 0.5 TABLETS BY MOUTH DAILY FOR FOUR WEEKS AND THEN INCREASE TO 1 TABLET DAILY     aspirin 81 MG tablet     COLCRYS 0.6 MG tablet  Generic drug:  colchicine  TAKE ONE TABLET BY MOUTH DAILY     Cyanocobalamin 1000 MCG/ML Kit  Inject 1,000 mcg weekly for 4 weeks, then monthly afterward     escitalopram 10 MG tablet  Commonly known as:  LEXAPRO  TAKE ONE TABLET BY MOUTH DAILY     folic acid 1 MG tablet  Commonly known as:  FOLVITE  TAKE 5 TABLETS (TOTAL 5MG) BY MOUTH DAILY     furosemide 20 MG tablet  Commonly known as:  LASIX  Take 1 tablet by mouth every other day     pantoprazole 40 MG tablet  Commonly known as:  PROTONIX  Take 1 tablet by mouth daily     potassium chloride 10 MEQ extended release tablet  Commonly known as:  KLOR-CON M  Take 1 tablet by mouth every other day            STOP taking these medications      predniSONE 5 MG tablet  Commonly known as:  Shaila Valladares               Where to Get Your Medications        These medications were sent to [address] [place] [address] [address] [place] [id number] [facility], [facility]      Phone:  [phone]   cefpodoxime 200 MG tablet  metoprolol succinate 25 MG extended release tablet  metroNIDAZOLE 500 MG tablet  rivaroxaban 15 MG Tabs tablet         Boone Lopez PharmD  Heart Failure Discharge Medication Reconciliation Program  [phone]

## 2019-04-02 NOTE — CARE COORDINATION
[address] Transitions Interview     2019    Patient: Kerrie Juarez Patient : 1929   MRN: [id number]  Reason for Admission:   RARS: Readmission Risk Score: 15         Spoke with: daughter Ondina Lemus      Readmission Risk  Patient Active Problem List   Diagnosis    Primary osteoarthritis involving multiple joints    History of breast cancer    Chronic midline low back pain without sciatica    Essential hypertension    Cataract    Lumbar spondylosis    Spinal stenosis, lumbar    Gouty arthropathy    Hypercholesterolemia    CKD (chronic kidney disease) stage 3, GFR 30-59 ml/min ([facility])    Folate deficiency    Vitamin B12 deficiency    Dementia without behavioral disturbance    Esophageal foreign body    Prediabetes    CAP (community acquired pneumonia) due to Chlamydia species    CHF (congestive heart failure), NYHA class I, acute on chronic, combined ([facility] [place] [facility] [address]    Typical atrial flutter ([facility])    Aspiration pneumonia [facility])       Inpatient Assessment  Care Transitions Summary    Care Transitions Inpatient Review  Medication Review  Do you have all of your prescriptions and are they filled?:  Yes   Barriers to Medication Adherence:  Other  Are you able to afford your medications?:  Yes  How often do you have difficulty taking your medications?:  I always take them as prescribed. Housing Review  Who do you live with?:  Partner/Spouse/SO, Child  Are you an active caregiver in your home?:  No  Social Support  Durable Medical Equipment  Patient DME:  Wheelchair, Shower chair, Julio César Pacer, Other  Other Patient DME:  rails on the toilet/grab bars in the shower/gait belt  Functional Review  Ability to seek help/take action for Emergent/Urgent situations i.e. fire, crime, inclement weather or health crisis. :  Dependent  Ability handle personal hygiene needs (bathing/dressing/grooming):  Dependent  Ability to manage medications:  Dependent  Ability to prepare food:  Dependent  Ability reminders to display for this patient.     Arianne Oviedo RN

## 2019-04-02 NOTE — PROGRESS NOTES
Occupational Therapy  Facility/Department: [facility] 5W PROGRESSIVE CARE  Daily Treatment Note  NAME: Teofilo Mark  : 1929  MRN: [id number]    Date of Service: 2019    Assessment: Pt tolerated session fair this date - most limited by decreased cognition and decreased strength. Pt required max A for bed mobility and mod/max Ax2 for sit <> stand to RW. Pt completed very short functional mobility with RW with mod/max Ax2, pt \"froze\" requiring significant assistance to advance LEs. Anticipate pt to require max/total A for ADL needs. Pt is unsafe to return home at this time d/t level of assist required. Pt would benefit from continued low frequency therapy to return to baseline mobility. Discharge Recommendations:  3-5 sessions per week     Teofilo Mark scored a  on the  [address] form. Current research shows that an AM-PAC score of 17 or less is typically not associated with a discharge to the patient's home setting. Based on the patients AM-PAC score and their current ADL deficits, it is recommended that the patient have 3-5 sessions per week of Occupational Therapy at d/c to increase the patients independence. Assessment   Performance deficits / Impairments: Decreased functional mobility ; Decreased ADL status  Assessment: Pt tolerated session fair this date - most limited by decreased cognition and decreased strength. Pt required max A for bed mobility and mod/max Ax2 for sit <> stand to RW. Pt completed very short functional mobility with RW with mod/max Ax2, pt \"froze\" requiring significant assistance to advance LEs. Anticipate pt to require max/total A for ADL needs. Pt is unsafe to return home at this time d/t level of assist required. Pt would benefit from continued low frequency therapy to return to baseline mobility.    Patient Education: Role of OT, d/c planning, safety and use of call light  Barriers to Learning: Decreased cognition  REQUIRES OT FOLLOW UP: Yes  Activity Plan  Times per week: 3-5  Times per day: Daily  Current Treatment Recommendations: Strengthening, ROM, Balance Training, Functional Mobility Training, Endurance Training, Safety Education & Training, Patient/Caregiver Education & Training, Equipment Evaluation, Education, & procurement, Self-Care / ADL    AM-PAC Score    Carrie Mooney scored a 12/24 on the AM-PAC ADL Inpatient form. Current research shows that an AM-PAC score of 17 or less is typically not associated with a discharge to the patient's home setting. Based on the patients AM-PAC score and their current ADL deficits, it is recommended that the patient have 3-5 sessions per week of Occupational Therapy at d/c to increase the patients independence. AM-PAC Inpatient Daily Activity Raw Score: 12  AM-PAC Inpatient ADL T-Scale Score : 30.6  ADL Inpatient CMS 0-100% Score: 66.57  ADL Inpatient CMS G-Code Modifier : CL    Goals  Short term goals  Time Frame for Short term goals: until d/c: status of all goals ongoing   Short term goal 1: Fxl mobility/transfers via RW and min A x 2. Short term goal 2: Bathing with mod A. Short term goal 3: Dressing with mod A. Short term goal 4: Toileting with mod A. Short term goal 5: Grooming with min A. Patient Goals   Patient goals : Pt did not state goal d/t decreased cognition. Therapy Time   Individual Concurrent Group Co-treatment   Time In       1015   Time Out       1040   Minutes       25   Timed Code Treatment Minutes: 25 Minutes     If pt is discharged prior to next OT session, this note will serve as the discharge summary.     Alex Armenta, OTR/L #[id number]

## 2019-04-02 NOTE — PROGRESS NOTES
[facility] [place]   Daily Progress Note      Admit Date:  3/29/2019    CC: \"I'm OK. \"    HPI:   Carrie Mooney is a 80 y.o. female with PMH Afib/flutter, dementia, GERD, DM Type 2 and esophageal stricture S/P dilatation 12/2018. Patient presented to [facility] ED with SOB and vomiting with concern for aspiration PNA R/T to esophageal stricture. Swallow eval completed yesterday. Impression: Moderate oral and pharyngeal dysphagia. Rec: Pureed diet, nectar thick liquids. Planned for DC today to [facility]. Patient is asleep but arouses easily. Minimal verbal reponse w/ dementia. Daughter at bedside and states she is still very weak when getting out of bed. Review of Systems:   General: Denies fever, chills, fatigue, weakness  Skin: Denies skin changes, rash, itching, lesions.   HEENT: Denies headache, dizziness, vision changes, nosebleeds, sore throat, nasal drainage  RESP: Denies cough, sputum, dyspnea, wheeze, snoring  CARD: Denies palpitations,  murmur  GI:Denies nausea, vomiting, heartburn, loss of appetite, change in bowels  : Denies frequency, pain, incontinence, polyuria  VASC: Denies claudication, leg cramps, clots  MUSC/SKEL: Denies pain, stiffness, arthritis  PSYCH: Denies anxiety, depression, stress  NEURO: Denies numbness, tingling, weakness,change in mood or memory  HEME: Denies abn bruising, bleeding, anemia  ENDO: Denies intolerance to heat, cold, excessive thirst or hunger, hx thyroid disease    Objective:   /87   Pulse 71   Temp 97.3 °F (36.3 °C) (Oral)   Resp 18   Ht 5' 2\" (1.575 m)   Wt 128 lb 15.5 oz (58.5 kg)   SpO2 96%   BMI 23.59 kg/m²           Intake/Output Summary (Last 24 hours) at 4/2/2019 1350  Last data filed at 4/2/2019 1050  Gross per 24 hour   Intake 1590 ml   Output 675 ml   Net 915 ml     I/O since adm: +2,973    WEIGHT:Admit Weight: 131 lb 2.8 oz (59.5 kg)         Today  Weight: 128 lb 15.5 oz (58.5 kg)   DRY WEIGHT:  Wt Readings from Last 3 Encounters:   04/02/19 128 lb 15.5 oz (58.5 kg)   02/26/19 130 lb (59 kg)   12/13/18 125 lb 14.1 oz (57.1 kg)       Physical Exam:  GEN: Appears well, no acute distress  SKIN: Pink, warm, dry. Nails without clubbing. HEENT: PERRLA. Normocephalic, atraumatic. Neck supple. No adenopathy. LUNG: AP diameter normal. Mild crackles bilateral bases. Exp ronchi throughout. HEART: S1S2 A/R. No JVD. No carotid bruit. No murmur, rub or gallop. ABD: Soft, nontender. +BS X 4 quads. No hepatomegaly. EXT: Radial and pedal pulses 2+ and symmetric. Without varicosities. Trace bilateral edema. MUSCSKEL: Good ROM X4 extremities. No deformity. NEURO: A/O X3. Calm and cooperative. Telemetry: A flutter HR 78 with occasional PVC. Medications:    rivaroxaban  15 mg Oral Daily    metoprolol succinate  25 mg Oral Daily    allopurinol  100 mg Oral Daily    colchicine  0.6 mg Oral Daily    escitalopram  10 mg Oral Daily    pantoprazole  40 mg Oral Daily    potassium chloride  10 mEq Oral Every Other Day    sodium chloride flush  10 mL Intravenous 2 times per day    ampicillin-sulbactam  1.5 g Intravenous Q6H    insulin lispro  0-12 Units Subcutaneous TID WC    insulin lispro  0-6 Units Subcutaneous Nightly      dextrose       metoprolol, sodium chloride flush, magnesium hydroxide, ondansetron, glucose, dextrose, glucagon (rDNA), dextrose    Lab Data: I have reviewed all labs below today.    CBC:   Recent Labs     04/01/19  0536 04/02/19  0532   WBC 11.9* 12.5*   HGB 11.5* 12.1   HCT 36.7 38.2   MCV 85.3 86.0    233     BMP:   Recent Labs     03/31/19  0529 04/01/19  0536 04/02/19  0532    139 139   K 3.7 3.9 4.1  4.1    105 103   CO2 24 23 20*   BUN 38* 21* 32*   CREATININE 1.3* 1.1 1.2     GLUCOSE:   Recent Labs     03/31/19  0529 04/01/19  0536 04/02/19  0532   GLUCOSE 135* 146* 133*     LIVER PROFILE:   Lab Results   Component Value Date    AST 23 03/29/2019    ALT 7 03/29/2019    LIPASE 8.0 03/29/2019    LABALBU 3.6 03/29/2019    BILITOT 1.4 03/29/2019    ALKPHOS 121 03/29/2019     PT/INR: No results found for: PROTIME, INR  APTT: No results found for: APTT  Pro-BNP:    Lab Results   Component Value Date    PROBNP 41,387 04/02/2019    PROBNP 23,576 03/31/2019    PROBNP 47,100 03/29/2019     ENZYMES:  Lab Results   Component Value Date    TROPONINI <0.01 03/29/2019     FASTING LIPID PANEL:  Lab Results   Component Value Date    CHOL 112 03/30/2019    HDL 48 03/30/2019    LDLCALC 47 03/30/2019    TRIG 83 03/30/2019       Diagnostics:    EKG: 3/30/19 A flutter HR 96    ECHO: 2/1/19   Summary   Normal LV size and systolic function. Estimated ejection fraction is 60%.  Mild mitral regurgitation is present.   The left atrium is moderately dilated.   Normal right ventricular size and function.   Mild pulmonic regurgitation present. Assessment/Plan:  1.) Chronic A flutter: HR elevated in the setting of hypoxic resp failure. Now stable w/ metoprolol po. CHADSVASC 6.   -Continue metoprolol succinate 25mg po daily  -Continue xarelto 15mg po daily for thromboembolic risk reduction  -Please notify cardiology if patient not able to take po following swallow eval, may changed to IV metoprolol     2.) Hypoxic resp failure: Most likely from PNA- possibly aspiration. Management per hosp team. SOB not from angina- troponin normal. Possible fluid overload contributing with elevated BNP, also may be falsely elevated in setting of acute infection. Recent ECHO without evidence of HF.  -Continue lasix 20mg po daily    OK to DC from card standpoint.  DC meds include:   Toprol XL 25mg po daily  Xarelto 15mg po daily      Electronically signed by ROBERTO Thomas CNP on 4/2/2019 at 1:50 PM

## 2019-04-02 NOTE — CONSULTS
HF RN consult received from Dr Sulaiman Stauffer as part of HF order set. Chart reviewed. Patient presented to ED with report of worsening SOB after an episode of vomiting while eating. Patient has dementia so bulk of info taken from daughter who provides her care. Patient has history of esophageal obstruction / food impaction July 2018 resulting in aspiration pneumonia. She had esophageal dilation x 2 in Dec 2018. Sats were 90% on arrival in ED. She was initially treated with IVFs but this was slowed when pBNP resulted at 47,100 with Cr 1.4. WBC 18K. Patient was treated with Lasix IV bolus. Procal 2.07. ProBNP did downtrend 3/31 to 23,576. Cardiology was consulted and recommended IV lopressor for BP control if aspiration continued to be concern. She was cleared for any surgery if needed. There was no mention of HF. Dr Saw Ivan did add Lasix 20 daily secondary to tendency to retain fluid d/t AF. ProBNP then sharply uptrended 4/2 to 41,387. Weight reduced 3# since admission. I/O was inaccurate due to leaking aguirre. HF measures were implemented : daily weights, I/O, and sodium / fluid restricted diet. HF careplan was current. Patient received >60 mins of HF education provided from bedside staff. Teaching done on premise of increased risk for HF secondary to arrythmia. Patient will be discharged to SNF so facility MD will serve to complete required 7 day follow up. HF instructions were added to [address]. Pharmacy was notified of need for discharge med rec.

## 2019-04-03 ENCOUNTER — CARE COORDINATION (OUTPATIENT)
Dept: CASE MANAGEMENT | Age: 84
End: 2019-04-03

## 2019-04-03 LAB
BLOOD CULTURE, ROUTINE: NORMAL
CULTURE, BLOOD 2: NORMAL

## 2019-04-04 ENCOUNTER — TELEPHONE (OUTPATIENT)
Dept: FAMILY MEDICINE CLINIC | Age: 84
End: 2019-04-04

## 2019-04-15 ENCOUNTER — APPOINTMENT (OUTPATIENT)
Dept: GENERAL RADIOLOGY | Age: 84
DRG: 871 | End: 2019-04-15
Payer: MEDICARE

## 2019-04-15 ENCOUNTER — HOSPITAL ENCOUNTER (INPATIENT)
Age: 84
LOS: 11 days | Discharge: HOME | DRG: 871 | End: 2019-04-26
Attending: INTERNAL MEDICINE | Admitting: INTERNAL MEDICINE
Payer: MEDICARE

## 2019-04-15 DIAGNOSIS — E11.65 TYPE 2 DIABETES MELLITUS WITH HYPERGLYCEMIA, UNSPECIFIED WHETHER LONG TERM INSULIN USE (HCC): ICD-10-CM

## 2019-04-15 DIAGNOSIS — N18.9 CHRONIC KIDNEY DISEASE, UNSPECIFIED CKD STAGE: ICD-10-CM

## 2019-04-15 DIAGNOSIS — I50.9 CONGESTIVE HEART FAILURE, UNSPECIFIED HF CHRONICITY, UNSPECIFIED HEART FAILURE TYPE (HCC): ICD-10-CM

## 2019-04-15 DIAGNOSIS — E87.79 OTHER HYPERVOLEMIA: ICD-10-CM

## 2019-04-15 DIAGNOSIS — A41.9 SEPSIS, DUE TO UNSPECIFIED ORGANISM: ICD-10-CM

## 2019-04-15 DIAGNOSIS — J18.9 HCAP (HEALTHCARE-ASSOCIATED PNEUMONIA): Primary | ICD-10-CM

## 2019-04-15 DIAGNOSIS — R22.43 LOCALIZED SWELLING OF BOTH LOWER LEGS: ICD-10-CM

## 2019-04-15 DIAGNOSIS — E87.20 LACTIC ACIDOSIS: ICD-10-CM

## 2019-04-15 DIAGNOSIS — Z51.5 COMFORT MEASURES ONLY STATUS: ICD-10-CM

## 2019-04-15 DIAGNOSIS — I48.92 ATRIAL FLUTTER, PAROXYSMAL (HCC): ICD-10-CM

## 2019-04-15 DIAGNOSIS — I48.0 PAROXYSMAL ATRIAL FIBRILLATION (HCC): ICD-10-CM

## 2019-04-15 LAB
A/G RATIO: 0.4 (ref 1.1–2.2)
ALBUMIN SERPL-MCNC: 2.2 G/DL (ref 3.4–5)
ALP BLD-CCNC: 269 U/L (ref 40–129)
ALT SERPL-CCNC: 26 U/L (ref 10–40)
ANION GAP SERPL CALCULATED.3IONS-SCNC: 14 MMOL/L (ref 3–16)
AST SERPL-CCNC: 15 U/L (ref 15–37)
BASE EXCESS VENOUS: -0.1 MMOL/L
BASOPHILS ABSOLUTE: 0.1 K/UL (ref 0–0.2)
BASOPHILS RELATIVE PERCENT: 0.4 %
BETA-HYDROXYBUTYRATE: 0.44 MMOL/L (ref 0–0.27)
BILIRUB SERPL-MCNC: 0.5 MG/DL (ref 0–1)
BUN BLDV-MCNC: 40 MG/DL (ref 7–20)
C-REACTIVE PROTEIN: 25.1 MG/L (ref 0–5.1)
CALCIUM SERPL-MCNC: 8.9 MG/DL (ref 8.3–10.6)
CARBOXYHEMOGLOBIN: 3.4 %
CHLORIDE BLD-SCNC: 108 MMOL/L (ref 99–110)
CO2: 20 MMOL/L (ref 21–32)
CREAT SERPL-MCNC: 1.6 MG/DL (ref 0.6–1.2)
EOSINOPHILS ABSOLUTE: 0.1 K/UL (ref 0–0.6)
EOSINOPHILS RELATIVE PERCENT: 0.4 %
GFR AFRICAN AMERICAN: 37
GFR NON-AFRICAN AMERICAN: 30
GLOBULIN: 4.9 G/DL
GLUCOSE BLD-MCNC: 256 MG/DL (ref 70–99)
GLUCOSE BLD-MCNC: 270 MG/DL (ref 70–99)
GLUCOSE BLD-MCNC: 326 MG/DL (ref 70–99)
GLUCOSE BLD-MCNC: 508 MG/DL (ref 70–99)
HCO3 VENOUS: 23 MMOL/L (ref 23–29)
HCT VFR BLD CALC: 40.9 % (ref 36–48)
HEMOGLOBIN: 12.7 G/DL (ref 12–16)
LACTIC ACID, SEPSIS: 2.8 MMOL/L (ref 0.4–1.9)
LACTIC ACID, SEPSIS: 4.2 MMOL/L (ref 0.4–1.9)
LACTIC ACID: 2.4 MMOL/L (ref 0.4–2)
LACTIC ACID: 4.1 MMOL/L (ref 0.4–2)
LYMPHOCYTES ABSOLUTE: 1 K/UL (ref 1–5.1)
LYMPHOCYTES RELATIVE PERCENT: 7.6 %
MCH RBC QN AUTO: 26.7 PG (ref 26–34)
MCHC RBC AUTO-ENTMCNC: 31 G/DL (ref 31–36)
MCV RBC AUTO: 86.2 FL (ref 80–100)
METHEMOGLOBIN VENOUS: 0.5 %
MONOCYTES ABSOLUTE: 1.2 K/UL (ref 0–1.3)
MONOCYTES RELATIVE PERCENT: 9.1 %
NEUTROPHILS ABSOLUTE: 10.8 K/UL (ref 1.7–7.7)
NEUTROPHILS RELATIVE PERCENT: 82.5 %
O2 CONTENT, VEN: 18 ML/DL
O2 SAT, VEN: 99 %
O2 THERAPY: ABNORMAL
ORGANISM: ABNORMAL
PCO2, VEN: 34.7 MMHG (ref 40–50)
PDW BLD-RTO: 20.5 % (ref 12.4–15.4)
PERFORMED ON: ABNORMAL
PH VENOUS: 7.44 (ref 7.35–7.45)
PLATELET # BLD: 305 K/UL (ref 135–450)
PMV BLD AUTO: 10.6 FL (ref 5–10.5)
PO2, VEN: 150 MMHG
POTASSIUM SERPL-SCNC: 5 MMOL/L (ref 3.5–5.1)
PRO-BNP: ABNORMAL PG/ML (ref 0–449)
RBC # BLD: 4.74 M/UL (ref 4–5.2)
REPORT: NORMAL
RESPIRATORY PANEL PCR: ABNORMAL
RESPIRATORY PANEL PCR: ABNORMAL
SEDIMENTATION RATE, ERYTHROCYTE: 18 MM/HR (ref 0–30)
SODIUM BLD-SCNC: 142 MMOL/L (ref 136–145)
TCO2 CALC VENOUS: 24 MMOL/L
TOTAL PROTEIN: 7.1 G/DL (ref 6.4–8.2)
WBC # BLD: 13.1 K/UL (ref 4–11)

## 2019-04-15 PROCEDURE — 6370000000 HC RX 637 (ALT 250 FOR IP): Performed by: INTERNAL MEDICINE

## 2019-04-15 PROCEDURE — 36415 COLL VENOUS BLD VENIPUNCTURE: CPT

## 2019-04-15 PROCEDURE — 87486 CHLMYD PNEUM DNA AMP PROBE: CPT

## 2019-04-15 PROCEDURE — 6360000002 HC RX W HCPCS: Performed by: HOSPITALIST

## 2019-04-15 PROCEDURE — 93010 ELECTROCARDIOGRAM REPORT: CPT | Performed by: INTERNAL MEDICINE

## 2019-04-15 PROCEDURE — 6360000002 HC RX W HCPCS: Performed by: NURSE PRACTITIONER

## 2019-04-15 PROCEDURE — 97535 SELF CARE MNGMENT TRAINING: CPT

## 2019-04-15 PROCEDURE — 85025 COMPLETE CBC W/AUTO DIFF WBC: CPT

## 2019-04-15 PROCEDURE — 2500000003 HC RX 250 WO HCPCS: Performed by: NURSE PRACTITIONER

## 2019-04-15 PROCEDURE — 96367 TX/PROPH/DG ADDL SEQ IV INF: CPT

## 2019-04-15 PROCEDURE — 87581 M.PNEUMON DNA AMP PROBE: CPT

## 2019-04-15 PROCEDURE — 92610 EVALUATE SWALLOWING FUNCTION: CPT

## 2019-04-15 PROCEDURE — 82803 BLOOD GASES ANY COMBINATION: CPT

## 2019-04-15 PROCEDURE — 83880 ASSAY OF NATRIURETIC PEPTIDE: CPT

## 2019-04-15 PROCEDURE — 93005 ELECTROCARDIOGRAM TRACING: CPT | Performed by: EMERGENCY MEDICINE

## 2019-04-15 PROCEDURE — 96365 THER/PROPH/DIAG IV INF INIT: CPT

## 2019-04-15 PROCEDURE — 87449 NOS EACH ORGANISM AG IA: CPT

## 2019-04-15 PROCEDURE — 99285 EMERGENCY DEPT VISIT HI MDM: CPT

## 2019-04-15 PROCEDURE — 94760 N-INVAS EAR/PLS OXIMETRY 1: CPT

## 2019-04-15 PROCEDURE — 94640 AIRWAY INHALATION TREATMENT: CPT

## 2019-04-15 PROCEDURE — 71045 X-RAY EXAM CHEST 1 VIEW: CPT

## 2019-04-15 PROCEDURE — 80053 COMPREHEN METABOLIC PANEL: CPT

## 2019-04-15 PROCEDURE — 97530 THERAPEUTIC ACTIVITIES: CPT

## 2019-04-15 PROCEDURE — 85652 RBC SED RATE AUTOMATED: CPT

## 2019-04-15 PROCEDURE — 93308 TTE F-UP OR LMTD: CPT

## 2019-04-15 PROCEDURE — 87633 RESP VIRUS 12-25 TARGETS: CPT

## 2019-04-15 PROCEDURE — 6360000002 HC RX W HCPCS: Performed by: INTERNAL MEDICINE

## 2019-04-15 PROCEDURE — 2700000000 HC OXYGEN THERAPY PER DAY

## 2019-04-15 PROCEDURE — 86140 C-REACTIVE PROTEIN: CPT

## 2019-04-15 PROCEDURE — 2580000003 HC RX 258: Performed by: NURSE PRACTITIONER

## 2019-04-15 PROCEDURE — 51702 INSERT TEMP BLADDER CATH: CPT

## 2019-04-15 PROCEDURE — 87040 BLOOD CULTURE FOR BACTERIA: CPT

## 2019-04-15 PROCEDURE — 83605 ASSAY OF LACTIC ACID: CPT

## 2019-04-15 PROCEDURE — 99223 1ST HOSP IP/OBS HIGH 75: CPT | Performed by: INTERNAL MEDICINE

## 2019-04-15 PROCEDURE — 92526 ORAL FUNCTION THERAPY: CPT

## 2019-04-15 PROCEDURE — 94664 DEMO&/EVAL PT USE INHALER: CPT

## 2019-04-15 PROCEDURE — 2580000003 HC RX 258: Performed by: HOSPITALIST

## 2019-04-15 PROCEDURE — 82010 KETONE BODYS QUAN: CPT

## 2019-04-15 PROCEDURE — 6370000000 HC RX 637 (ALT 250 FOR IP): Performed by: NURSE PRACTITIONER

## 2019-04-15 PROCEDURE — 97166 OT EVAL MOD COMPLEX 45 MIN: CPT

## 2019-04-15 PROCEDURE — 87641 MR-STAPH DNA AMP PROBE: CPT

## 2019-04-15 PROCEDURE — 2580000003 HC RX 258: Performed by: INTERNAL MEDICINE

## 2019-04-15 PROCEDURE — 2060000000 HC ICU INTERMEDIATE R&B

## 2019-04-15 PROCEDURE — 96375 TX/PRO/DX INJ NEW DRUG ADDON: CPT

## 2019-04-15 PROCEDURE — 97162 PT EVAL MOD COMPLEX 30 MIN: CPT

## 2019-04-15 PROCEDURE — 94762 N-INVAS EAR/PLS OXIMTRY CONT: CPT

## 2019-04-15 PROCEDURE — 93320 DOPPLER ECHO COMPLETE: CPT

## 2019-04-15 PROCEDURE — 87798 DETECT AGENT NOS DNA AMP: CPT

## 2019-04-15 RX ORDER — 0.9 % SODIUM CHLORIDE 0.9 %
500 INTRAVENOUS SOLUTION INTRAVENOUS ONCE
Status: COMPLETED | OUTPATIENT
Start: 2019-04-15 | End: 2019-04-15

## 2019-04-15 RX ORDER — IPRATROPIUM BROMIDE AND ALBUTEROL SULFATE 2.5; .5 MG/3ML; MG/3ML
1 SOLUTION RESPIRATORY (INHALATION) ONCE
Status: COMPLETED | OUTPATIENT
Start: 2019-04-15 | End: 2019-04-15

## 2019-04-15 RX ORDER — MULTIVIT-MIN/IRON/FOLIC ACID/K 18-600-40
2000 CAPSULE ORAL DAILY
Status: ON HOLD | COMMUNITY
End: 2019-04-26 | Stop reason: HOSPADM

## 2019-04-15 RX ORDER — SODIUM CHLORIDE 9 MG/ML
INJECTION, SOLUTION INTRAVENOUS CONTINUOUS
Status: DISCONTINUED | OUTPATIENT
Start: 2019-04-15 | End: 2019-04-15

## 2019-04-15 RX ORDER — SODIUM CHLORIDE 0.9 % (FLUSH) 0.9 %
10 SYRINGE (ML) INJECTION PRN
Status: DISCONTINUED | OUTPATIENT
Start: 2019-04-15 | End: 2019-04-26 | Stop reason: HOSPADM

## 2019-04-15 RX ORDER — METRONIDAZOLE 500 MG/1
500 TABLET ORAL EVERY 8 HOURS
Status: ON HOLD | COMMUNITY
Start: 2019-04-10 | End: 2019-04-23 | Stop reason: HOSPADM

## 2019-04-15 RX ORDER — SODIUM CHLORIDE 0.9 % (FLUSH) 0.9 %
10 SYRINGE (ML) INJECTION EVERY 12 HOURS SCHEDULED
Status: DISCONTINUED | OUTPATIENT
Start: 2019-04-15 | End: 2019-04-26 | Stop reason: HOSPADM

## 2019-04-15 RX ORDER — FUROSEMIDE 20 MG/1
10 TABLET ORAL EVERY OTHER DAY
Status: ON HOLD | COMMUNITY
End: 2019-04-23 | Stop reason: HOSPADM

## 2019-04-15 RX ORDER — PANTOPRAZOLE SODIUM 40 MG/1
40 TABLET, DELAYED RELEASE ORAL DAILY
Status: DISCONTINUED | OUTPATIENT
Start: 2019-04-15 | End: 2019-04-23

## 2019-04-15 RX ORDER — ALLOPURINOL 100 MG/1
100 TABLET ORAL DAILY
Status: DISCONTINUED | OUTPATIENT
Start: 2019-04-15 | End: 2019-04-26 | Stop reason: HOSPADM

## 2019-04-15 RX ORDER — FUROSEMIDE 10 MG/ML
40 INJECTION INTRAMUSCULAR; INTRAVENOUS ONCE
Status: COMPLETED | OUTPATIENT
Start: 2019-04-15 | End: 2019-04-15

## 2019-04-15 RX ORDER — COLCHICINE 0.6 MG/1
0.6 TABLET ORAL DAILY
Status: DISCONTINUED | OUTPATIENT
Start: 2019-04-15 | End: 2019-04-26 | Stop reason: HOSPADM

## 2019-04-15 RX ORDER — FUROSEMIDE 10 MG/ML
40 INJECTION INTRAMUSCULAR; INTRAVENOUS DAILY
Status: DISCONTINUED | OUTPATIENT
Start: 2019-04-16 | End: 2019-04-16

## 2019-04-15 RX ORDER — ASPIRIN 81 MG/1
81 TABLET, CHEWABLE ORAL EVERY OTHER DAY
Status: DISCONTINUED | OUTPATIENT
Start: 2019-04-15 | End: 2019-04-23

## 2019-04-15 RX ORDER — DILTIAZEM HYDROCHLORIDE 5 MG/ML
10 INJECTION INTRAVENOUS ONCE
Status: COMPLETED | OUTPATIENT
Start: 2019-04-15 | End: 2019-04-15

## 2019-04-15 RX ORDER — ESCITALOPRAM OXALATE 10 MG/1
10 TABLET ORAL DAILY
Status: DISCONTINUED | OUTPATIENT
Start: 2019-04-15 | End: 2019-04-26 | Stop reason: HOSPADM

## 2019-04-15 RX ORDER — AMOXICILLIN AND CLAVULANATE POTASSIUM 500; 125 MG/1; MG/1
1 TABLET, FILM COATED ORAL 2 TIMES DAILY
Status: ON HOLD | COMMUNITY
Start: 2019-04-10 | End: 2019-04-23 | Stop reason: HOSPADM

## 2019-04-15 RX ORDER — METOPROLOL SUCCINATE 25 MG/1
25 TABLET, EXTENDED RELEASE ORAL DAILY
Status: DISCONTINUED | OUTPATIENT
Start: 2019-04-15 | End: 2019-04-24

## 2019-04-15 RX ADMIN — RIVAROXABAN 15 MG: 15 TABLET, FILM COATED ORAL at 12:40

## 2019-04-15 RX ADMIN — METRONIDAZOLE 500 MG: 500 INJECTION, SOLUTION INTRAVENOUS at 02:00

## 2019-04-15 RX ADMIN — VANCOMYCIN HYDROCHLORIDE 1000 MG: 1 INJECTION, POWDER, LYOPHILIZED, FOR SOLUTION INTRAVENOUS at 04:36

## 2019-04-15 RX ADMIN — DILTIAZEM HYDROCHLORIDE 10 MG: 5 INJECTION INTRAVENOUS at 01:20

## 2019-04-15 RX ADMIN — ESCITALOPRAM OXALATE 10 MG: 10 TABLET ORAL at 12:41

## 2019-04-15 RX ADMIN — CEFEPIME HYDROCHLORIDE 1 G: 1 INJECTION, POWDER, FOR SOLUTION INTRAMUSCULAR; INTRAVENOUS at 02:48

## 2019-04-15 RX ADMIN — ALLOPURINOL 100 MG: 100 TABLET ORAL at 12:40

## 2019-04-15 RX ADMIN — SODIUM CHLORIDE 500 ML: 9 INJECTION, SOLUTION INTRAVENOUS at 04:14

## 2019-04-15 RX ADMIN — COLCHICINE 0.6 MG: 0.6 TABLET, FILM COATED ORAL at 12:40

## 2019-04-15 RX ADMIN — METOPROLOL SUCCINATE 25 MG: 25 TABLET, EXTENDED RELEASE ORAL at 12:40

## 2019-04-15 RX ADMIN — Medication 10 ML: at 10:16

## 2019-04-15 RX ADMIN — AZITHROMYCIN MONOHYDRATE 500 MG: 500 INJECTION, POWDER, LYOPHILIZED, FOR SOLUTION INTRAVENOUS at 11:13

## 2019-04-15 RX ADMIN — Medication 10 ML: at 21:01

## 2019-04-15 RX ADMIN — INSULIN HUMAN 10 UNITS: 100 INJECTION, SOLUTION PARENTERAL at 03:52

## 2019-04-15 RX ADMIN — PANTOPRAZOLE SODIUM 40 MG: 40 TABLET, DELAYED RELEASE ORAL at 12:40

## 2019-04-15 RX ADMIN — CEFEPIME HYDROCHLORIDE 1 G: 1 INJECTION, POWDER, FOR SOLUTION INTRAMUSCULAR; INTRAVENOUS at 16:33

## 2019-04-15 RX ADMIN — FUROSEMIDE 40 MG: 10 INJECTION, SOLUTION INTRAMUSCULAR; INTRAVENOUS at 01:25

## 2019-04-15 RX ADMIN — IPRATROPIUM BROMIDE AND ALBUTEROL SULFATE 1 AMPULE: .5; 3 SOLUTION RESPIRATORY (INHALATION) at 01:40

## 2019-04-15 RX ADMIN — ASPIRIN 81 MG 81 MG: 81 TABLET ORAL at 12:40

## 2019-04-15 ASSESSMENT — PAIN SCALES - WONG BAKER: WONGBAKER_NUMERICALRESPONSE: 0

## 2019-04-15 ASSESSMENT — PAIN SCALES - GENERAL
PAINLEVEL_OUTOF10: 0

## 2019-04-15 ASSESSMENT — ENCOUNTER SYMPTOMS
GASTROINTESTINAL NEGATIVE: 1
SHORTNESS OF BREATH: 1

## 2019-04-15 NOTE — H&P
Hospitalist  History and Physical    Patient:  Tez Michel  MRN: [id number]  PCP: Jacinda Espinoza MD    CHIEF COMPLAINT:  SOB      HISTORY OF PRESENT ILLNESS:   The patient Tez Michel is a 80 y. o.female Home with medical history significant for hypertension, gouty arthritis, breast cancer, lumbar spondylosis, osteoporosis spinal stenosis and diabetes mellitus  Patient presented to the emergency room with chief complaint of shortness of breath  Patient was recently discharged from the hospital when she was admitted with pneumonia, CHF and atrial fibrillation  Patient is unable to provide any history due to mental status and dementia        Past Medical History:        Diagnosis Date    Cataract     Chronic midline low back pain without sciatica     Essential hypertension     GERD (gastroesophageal reflux disease)     Gouty arthropathy     History of breast cancer     Hypercholesterolemia     Incontinence     Lumbar spondylosis     Osteoporosis of multiple sites     Primary osteoarthritis involving multiple joints     Spinal stenosis, lumbar     Type 2 diabetes mellitus without complication, without long-term current use of insulin ([facility] [address]        Past Surgical History:        Procedure Laterality Date    CATARACT REMOVAL  jan 2012    dr Uyen Cadet left    CATARACT REMOVAL  Nov. 2012    dr Uyen Cadet  right   [address] N/A 12/6/2018    ESOPHAGEAL DILATION Mar Orrick performed by Kelly Markham MD at [address] 12/13/2018    ESOPHAGEAL DILATION Mar Jose performed by Kelly Markham MD at [place] [address] ENDOSCOPY N/A 12/6/2018    EGD ESOPHAGOGASTRODUODENOSCOPY performed by Kelly Markham MD at [facility]  12/13/2018    with 44 Lomas dil    UPPER GASTROINTESTINAL ENDOSCOPY N/A 12/13/2018    EGD DIAGNOSTIC ONLY performed by Ginna Rogel MD at [facility]       Medications Prior to Admission:    Prior to Admission medications    Medication Sig Start Date End Date Taking?  Authorizing Provider   furosemide (LASIX) 20 MG tablet Take 10 mg by mouth every other day   Yes Historical Provider, MD   metroNIDAZOLE (FLAGYL) 500 MG tablet Take 500 mg by mouth every 8 hours 4/10/19 4/19/19 Yes Historical Provider, MD   amoxicillin-clavulanate (AUGMENTIN) 500-125 MG per tablet Take 1 tablet by mouth 2 times daily 4/10/19 4/19/19 Yes Historical Provider, MD   Cholecalciferol (VITAMIN D) 2000 units CAPS capsule Take 2,000 Units by mouth daily   Yes Historical Provider, MD   metoprolol succinate (TOPROL XL) 25 MG extended release tablet Take 1 tablet by mouth daily 4/3/19  Yes Yoan Dalton MD   rivaroxaban (XARELTO) 15 MG TABS tablet Take 1 tablet by mouth daily 4/2/19 7/1/19 Yes Yoan Dalton MD   potassium chloride (KLOR-CON M) 10 MEQ extended release tablet Take 1 tablet by mouth every other day 2/28/19  Yes Tim Ruggiero MD   furosemide (LASIX) 20 MG tablet Take 1 tablet by mouth every other day 2/28/19  Yes Tim Ruggiero MD   folic acid (FOLVITE) 1 MG tablet TAKE 5 TABLETS (TOTAL 5MG) BY MOUTH DAILY 2/11/19  Yes Tim Ruggiero MD   allopurinol (ZYLOPRIM) 100 MG tablet TAKE 0.5 TABLETS BY MOUTH DAILY FOR FOUR WEEKS AND THEN INCREASE TO 1 TABLET DAILY 1/2/19  Yes Bettie Upton MD   COLCRYS 0.6 MG tablet TAKE ONE TABLET BY MOUTH DAILY 1/2/19  Yes Bettie Upton MD   pantoprazole (PROTONIX) 40 MG tablet Take 1 tablet by mouth daily 12/6/18  Yes Ginna Rogel MD   aspirin 81 MG tablet Take 81 mg by mouth every other day    Yes Historical Provider, MD   escitalopram (LEXAPRO) 10 MG tablet TAKE ONE TABLET BY MOUTH DAILY 10/18/18  Yes Tim Ruggiero MD   Cyanocobalamin 1000 MCG/ML KIT Inject 1,000 mcg weekly for 4 weeks, then monthly afterward 4/4/18  Yes Dyan Barker MD       Allergies:  Patient has no known allergies. Social History:   TOBACCO:   reports that she has never smoked. She has never used smokeless tobacco.  ETOH:   reports that she does not drink alcohol. Family History:   Unable to get family history due to dementia        REVIEW OF SYSTEMS:     Unable to do review of systems due to patient's mental status      CONSTITUTIONAL:      fatigue, fever, chills or night sweats, recent weight gain, recent wt loss, insomnia,  General weakness, poor appetite, muscle aches and pains    HEAD: headache, dizziness    EYES:      blurriness,  double vision, dryness,  discharge, irritation,diplopia    EARS:      hearing loss, vertigo, ear discharge,  Earache. Ringing in the ears. NOSE:      Rhinorrhea, sneezing, epistaxis. Discharge, sinusitis,     MOUTH/THROAT:         sore throat, mouth ulcers, Hoarseness    RESPIRATORY:        Shortness of breath, wheezing,  cough, sputum, hemoptysis, obstructive sleep apnea,    CARDIOVASCULAR :      chest pain, palpitations, dyspnea on exercise, Lower extrimity edema (swelling),     GASTROINTESTINAL:       Dysphagia, Poor appetite,  Nausea, Vomiting, diarrhea, heartburn, abdominal pain. Blood in the stools, hematemesis. Pain with swallowing, constipation    GENITOURINARY:       Urinary frequency, hesitancy,  urgency, Dysuria, hematuria,  Urinary Incontinence. Urinary Retention. GYNECOLOGICAL: vaginal bleeding , vaginal discharge, menopause    MUSCULOSKELETAL:       joint swelling or stiffness, joint pain, muscle pain, balance problems, low back pain. NEUROLOGICAL:      Gait problems. Tremor. Dizziness. Pain and paresthesias, weakness in extremities.  Seizures, memory loss    Bruises/bleeds easily  PSYCHLOGICAL:        Anxiety, depression    SKIN :      Rashes ulcers, skin color changes, easy bruisability, lymphadenopathy      Physical Exam:      Vitals: /78   Pulse 70   Temp 97.8 °F (36.6 °C) (Axillary)   Resp 18   Ht 5' 2\" (1.575 m)   Wt 134 lb 4.2 oz (60.9 kg)   SpO2 91%   BMI 24.56 kg/m²     Gen:          Alert and oriented x 2  Eyes: PERRL. No sclera icterus. No conjunctival injection. ENT: No discharge. Pharynx clear. External appearance of ears and nose normal.  Neck: Trachea midline. No obvious mass. Resp: decreased breath sounds bilaterally scattered a few coarse crackles  CV: Regular rate. Regular rhythm. No murmur or rub. No edema. basilar crackels  GI: Non-tender. Non-distended. No hernia. Skin: Warm, dry, normal texture and turgor. Lymph: No cervical LAD. No supraclavicular LAD. M/S: / Ext. No cyanosis. No clubbing. No joint deformity. Neuro: Moves all four extremities. CN 2-12 tested, no deficits noted. Peripheral pulses and capillary refill is intact. CBC:   Recent Labs     04/15/19  0144   WBC 13.1*   HGB 12.7        BMP:    Recent Labs     04/15/19  0144      K 5.0      CO2 20*   BUN 40*   CREATININE 1.6*   GLUCOSE 508*     Hepatic:   Recent Labs     04/15/19  0144   AST 15   ALT 26   BILITOT 0.5   ALKPHOS 269*     Troponin: No results for input(s): TROPONINI in the last 72 hours. BNP: No results for input(s): BNP in the last 72 hours. INR: No results for input(s): INR in the last 72 hours. Lab Results   Component Value Date    LABA1C 6.2 07/16/2018           No results for input(s): CKTOTAL in the last 72 hours. -----------------------------------------------------------------  XR CHEST PORTABLE   Mild progressive perihilar opacities which may represent developing pulmonary   edema or progressive bronchopneumonia.        EKG  Atrial fibrillation with rapid ventricular response with premature ventricular or aberrantly conducted complexesNonspecific T wave abnormality , probably digitalis effectAbnormal           Assessment / Plan     Acute hypoxic respiratory failure with hypoxia      pneumonia  Likely gram-negative  Healthcare associated pneumonia  Start patient on antibiotics and consult pulmonary  Continue on cefepime  Check pro-calcitonin  Echocardiogram in a.m. Continue on diuretics    dysphagia  Speech therapy evaluation    Gouty arthritis  Continue on allopurinol and colchicine    Atrial fibrillation  Metoprolol and Xarelto      DVT and GI prophylaxis      Full Code      Suki Murcia M.D    This note was transcribed using [address] [employer]. Please disregard any translational errors.

## 2019-04-15 NOTE — ED PROVIDER NOTES
[address]  [address] [facility] [address]  Dept: [phone]  Loc: [phone]  eMERGENCYdEPARTMENT eNCOUnter      Pt Name: Lanny Dwyer  MRN: [id number]  [place] 9/2/1929  Date of evaluation: 4/15/2019  Provider:ROBERTO Delcid CNP     Evaluated by the Advanced Practice Provider    [address] [place] [address]       Chief Complaint   Patient presents with    Shortness of Breath       CRITICAL CARE TIME       HISTORY OF PRESENT ILLNESS  (Location/Symptom, Timing/Onset, Context/Setting, Quality, Duration,Modifying Factors, Severity.)   Lanny Dwyer is a 80 y.o. female who presents to the emergency department shortness of breath, hypertension. Patient was discharged from [facility] on April 2. She was treated for community-acquired pneumonia, aspiration pneumonia, congestive heart failure atrial fibrillation. She was discharged to the [facility]. CODE STATUS: Full code    EMS reported a blood glucose per fingerstick of 554  Patient has documented history of type 2 diabetes but there was no diabetic medication listed on the discharge med sheet and the nursing care facility did not provide a medication sheet to examine her current meds for diabetic medications. Nursing Notes were reviewedand agreed with or any disagreements were addressed in the HPI. REVIEW OF SYSTEMS    (2-9 systems for level 4, 10 or more for level 5)     Review of Systems   Unable to perform ROS: Dementia (Patient denies complaints, she is not a reliable source of information as she has Alzheimer's dementia)   Respiratory: Positive for shortness of breath. Gastrointestinal: Negative. Hematological: Bruises/bleeds easily. Except as noted above the remainder of the review of systems was reviewed and negative.        PAST MEDICAL HISTORY         Diagnosis Date    Cataract     Chronic midline low back pain without sciatica  Essential hypertension     GERD (gastroesophageal reflux disease)     Gouty arthropathy     History of breast cancer     Hypercholesterolemia     Incontinence     Lumbar spondylosis     Osteoporosis of multiple sites     Primary osteoarthritis involving multiple joints     Spinal stenosis, lumbar     Type 2 diabetes mellitus without complication, without long-term current use of insulin [facility])        SURGICAL HISTORY           Procedure Laterality Date    CATARACT REMOVAL  2012    dr Ariel Cochran left    CATARACT REMOVAL  2012    dr Ariel Cochran  right   [address] N/A 2018    ESOPHAGEAL DILATION Edwinna Nelly performed by Juan Kaminski MD at [address] N/A 2018    ESOPHAGEAL DILATION Edwinna Nelly performed by Juan Kaminski MD at [facility] ENDOSCOPY N/A 2018    EGD ESOPHAGOGASTRODUODENOSCOPY performed by Juan Kaminski MD at [facility]  2018    with 44 [address]omas dil    UPPER GASTROINTESTINAL ENDOSCOPY N/A 2018    EGD DIAGNOSTIC ONLY performed by Juan Kaminski MD at [address]     [unfilled]    ALLERGIES     Patient has no known allergies. FAMILY HISTORY     History reviewed. No pertinent family history. Family Status   Relation Name Status    Mother      Father          SOCIAL HISTORY      reports that she has never smoked. She has never used smokeless tobacco. She reports that she does not drink alcohol or use drugs.     PHYSICAL EXAM    (up to 7 for level 4, 8 or more for level 5)     ED Triage Vitals   Enc Vitals Group      BP 04/15/19 0024 (!) 173/112      Pulse 04/15/19 0024 112      Resp 04/15/19 0024 17      Temp 04/15/19 0029 98.2 °F (36.8 °C)      Temp Source 04/15/19 0029 Oral      SpO2 04/15/19 0024 98 % Weight 04/15/19 0024 146 lb (66.2 kg)      Height --       Head Circumference --       Peak Flow --       Pain Score --       Pain Loc --       Pain Edu? --       Excl. in [address]? --         Physical Exam   Constitutional: She appears well-developed and well-nourished. She is cooperative. She has a sickly appearance. She appears ill. She appears distressed. HENT:   Head: Normocephalic. Eyes: Pupils are equal, round, and reactive to light. Cardiovascular: S1 normal, S2 normal, normal heart sounds and intact distal pulses. An irregularly irregular rhythm present. Tachycardia present. PMI is not displaced. Pulses:       Radial pulses are 2+ on the right side, and 2+ on the left side. Pulmonary/Chest:   Respirations are easy regular shallow, room air pulse oximetry ranges from 89% to 98% on room air. Rales and rhonchi throughout anterior and posterior. Worse on the left than the right Congested wet cough. Abdominal: Soft. Bowel sounds are normal.   Neurological: She is alert. Oriented to person   Skin: Skin is warm and dry. Capillary refill takes less than 2 seconds. She is not diaphoretic. Nursing note and vitals reviewed. DIAGNOSTIC RESULTS     EKG: All EKG's are interpreted by the Emergency Department Physician who either signs or Co-signs this chart in the absence of a cardiologist.  EKG interpreted per Dr. Sutton Bowels reviewed per myself atrial fibrillation rapid ventricular response: Ventricular rate 113, QRS 82,   RADIOLOGY:   Non-plain film images such as CT, Ultrasound and MRI are read by the radiologist. Plain radiographic images are visualized and preliminarilyinterpreted by the emergency physician with the below findings:    Interpretation per the Radiologist below,if available at the time of this note:    XR CHEST PORTABLE   Final Result   Mild progressive perihilar opacities which may represent developing pulmonary   edema or progressive bronchopneumonia.                LABS:  Labs 29   Temp:       TempSrc:       SpO2: 96% 100% 97% 98%   Weight:         Medications   cefepime (MAXIPIME) 1 g IVPB minibag (1 g Intravenous New Bag 4/15/19 0248)   metronidazole (FLAGYL) 500 mg in NaCl 100 mL IVPB premix (has no administration in time range)   vancomycin 1000 mg IVPB in 250 mL D5W addavial (has no administration in time range)   insulin regular (HUMULIN R;NOVOLIN R) injection 10 Units (has no administration in time range)   ipratropium-albuterol (DUONEB) nebulizer solution 1 ampule (1 ampule Inhalation Given 4/15/19 0140)   furosemide (LASIX) injection 40 mg (40 mg Intravenous Given 4/15/19 0125)   diltiazem injection 10 mg (10 mg Intravenous Given 4/15/19 0120)       MDM  Differential diagnosis: Exacerbation of heart failure/progression of heart failure, sepsis, renal failure, atrial fibrillation with rapid ventricular response    Patient was seen and evaluated per myself. Dr. Jose Hwang present available for consultation as needed. Cardizem 10 mg IVP will be given for afib with RVR  Lasix will be given for HF    Chest x-ray concerning for progression of bronchial pneumonia. Cefepime, flagyl and vancomycin will be ordered to cover for pneumonia and given recent treatment, hospitalization and ECF admission, now she needs cover for HCAP. BG elevated 505: insulin 10 units will be ordered; Betahydrox and VBG will be ordered. CKD comparable recent studies during last admission. BUN 40, creatinine 1.6 and GFR is 30. Lactic acid 4.1. Patient will receive antibiotic coverage and a repeat lactic acid. White blood cell count greater than 13. Mild left shift. Her BNP is elevated at 29,000. She does have a history of heart failure and has received Lasix. 0250: Bedside re-evaluation: awake, alert, asking for something to eat. Has converted to atrial flutter at a rate of 70 per telemetry monitoring appears to be a 3-4 block. Blood pressure very stable 161 systolically.  Patient is speaking more fluently with her daughters. Pulse oximetry 98% on room air. Refill less than 2 seconds. She denies pain. She denies feeling short of breath. She doesn't still have a congested cough. I have discussed with the family the sepsis in the heart failure. I have discussed with them septic protocol in regards to high fluid administration at 30 mL's per kilo and given her current history and decompensated state they do not feel that 30 mL's per kilo would be of benefit to her and they are declining. This seems reasonable to me given the fact that she seems less distressed now after receiving 40 of Lasix and converting from an atrial fib RVR rhythm. 0315: I have consult it with Dr. Cheryl Carpio, hospitalist attending. Findings are consistent with sepsis. However she is not in septic shock. Lactic acid needs to be repeated. Patient will receive a 500 mL bolus. CONSULTS:  IP CONSULT TO HOSPITALIST    PROCEDURES:  Procedures    FINAL IMPRESSION      1. HCAP (healthcare-associated pneumonia)    2. Sepsis, due to unspecified organism ([facility] [address]    3. Lactic acidosis    4. Chronic kidney disease, unspecified CKD stage    5. Type 2 diabetes mellitus with hyperglycemia, unspecified whether long term insulin use ([facility] [address]    6. Congestive heart failure, unspecified HF chronicity, unspecified heart failure type (HCC)    7. Paroxysmal atrial fibrillation ([address]    8. Atrial flutter, paroxysmal ([facility] [address]          DISPOSITION/PLAN   [unfilled]    PATIENT REFERRED TO:  No follow-up provider specified.     DISCHARGE MEDICATIONS:  New Prescriptions    No medications on file       (Please note that portions of this note were completed with a voice recognition program.  Efforts were made to edit the dictations but occasionally words are mis-transcribed.)    Jeramy Glover, APRN - ROBERTO Patel - [place]  04/15/19 6320

## 2019-04-15 NOTE — PROGRESS NOTES
Type 2 diabetes mellitus without complication, without long-term current use of insulin ([facility] [address] has a past surgical history that includes Mastectomy; Cataract removal (jan 2012); Cataract removal (Nov. 2012); Cholecystectomy; Upper gastrointestinal endoscopy; Upper gastrointestinal endoscopy (N/A, 12/6/2018); Esophagus dilation (N/A, 12/6/2018); Upper gastrointestinal endoscopy (12/13/2018); Upper gastrointestinal endoscopy (N/A, 12/13/2018); and Esophagus dilation (N/A, 12/13/2018). Restrictions  Restrictions/Precautions  Restrictions/Precautions: Fall Risk  Position Activity Restriction  Other position/activity restrictions: diet: dysphagia I pureed/ honey thick    Subjective   General  Chart Reviewed: Yes  Family / Caregiver Present: Yes( and daughter)  Diagnosis: HCAP, sepsis, hyperglycemia  Subjective  Subjective: Pt not very talkative. General Comment  Comments: Pt in bed, cooperative, but decreased cognition and very [place].   Oxygen Therapy  SpO2: 99 %  O2 Device: Nasal cannula  Social/Functional History  Social/Functional History  Lives With: Spouse(and dgt (dgt works at night))  Type of Home: House  Home Layout: Able to Live on Main level with bedroom/bathroom  Home Access: Stairs to enter with rails, Ramped entrance  Entrance Stairs - Number of Steps: 5  Entrance Stairs - Rails: Both  Bathroom Shower/Tub: Walk-in shower, Shower chair with back  [employer]: Handicap height  Bathroom Equipment: Hand-held shower, Toilet raiser, Shower chair, Grab bars in shower, Grab bars around toilet  Bathroom Accessibility: Walker accessible  Home Equipment: 4 wheeled walker, Rolling walker(transport w/c)  Receives Help From: Family, Home health(HHPT 2x/week, HHN(qow) and HHA (2x/week))  ADL Assistance: Needs assistance(HHA assists with bathing 2x/week; dgt asissts with toileting (the pt uses depends))  Homemaking Assistance: (family does all)  Homemaking Responsibilities: No  Ambulation Assistance: Independent(uses the 2 wheeled walker and gait belt and dgt)  Transfer Assistance: Independent(sleep in recliner; dgt sometimes has to help her get out of the recliner)  Active : No  Mode of Transportation: Family  Additional Comments: the dgt reports she found her on the floor in March       Objective   Vision: Within Functional Limits  Hearing: Exceptions to [facility]  Hearing Exceptions: Hard of hearing/hearing concerns; No hearing aid    Orientation  Overall Orientation Status: Impaired  Orientation Level: Disoriented to situation;Oriented to person;Disoriented to time;Disoriented to place     Standing Balance  Sit to stand: 2 Person assistance(Mod A of 2 from EOB to stand with posterior lean at walker)  ADL  Feeding: Maximum assistance  Grooming: Maximum assistance  UE Bathing: Maximum assistance  LE Bathing: Maximum assistance  UE Dressing: Maximum assistance  LE Dressing: Maximum assistance  Toileting: Maximum assistance/ dependent        Bed mobility  Supine to Sit: Maximum assistance  Sit to Supine: Maximum assistance;2 Person assistance  Transfers  Stand Pivot Transfers: Dependent/Total(unable to get her balance or take steps with walker;  Berhane Theresa needed for moving up at EOB )  Sit to stand: 2 Person assistance(Mod A of 2 from EOB to stand with posterior lean at walker)     Cognition  Overall Cognitive Status: Exceptions  Arousal/Alertness: Delayed responses to stimuli  Following Commands: Inconsistently follows commands  Attention Span: Difficulty attending to directions  Problem Solving: Assistance required to generate solutions;Assistance required to implement solutions  Initiation: Requires cues for some  Sequencing: Requires cues for some               LUE AROM (degrees)  LUE AROM : Exceptions  L Shoulder Flexion 0-180: 30  Left Hand AROM (degrees)  Left Hand AROM: WFL  RUE AROM (degrees)  RUE AROM : Exceptions  R Shoulder Flexion 0-180: 30  Right Hand AROM (degrees)  Right Hand AROM: [facility] Plan   Plan  Times per week: 3-5x  Current Treatment Recommendations: Functional Mobility Training, Equipment Evaluation, Education, & procurement, Patient/Caregiver Education & Training, Self-Care / ADL, Balance Training, Strengthening       OutComes Score    Ralph Tran scored a 9/24 on the [facility] ADL Inpatient form. Current research shows that an AM-PAC score of 17 or less is typically not associated with a discharge to the patient's home setting. Based on the patients AM-PAC score and their current ADL deficits, it is recommended that the patient have 3-5 sessions per week of Occupational Therapy at d/c to increase the patients independence. AM-PAC Score        [facility] Inpatient Daily Activity Raw Score: 9  AM-PAC Inpatient ADL T-Scale Score : 25.33  ADL Inpatient CMS 0-100% Score: 79.59  ADL Inpatient CMS G-Code Modifier : CL    Goals  Short term goals  Time Frame for Short term goals: at d/c:  Short term goal 1: Mod A for feeding and grooming  Short term goal 2: Max A for bathing and dressing  Short term goal 3: Max A for bed mobility  Short term goal 4:  Max A for pivot transfers  Patient Goals   Patient goals : pt unable to make goal       Therapy Time   Individual Concurrent Group Co-treatment   Time In 1530         Time Out 1623         Minutes [address] Joan Cole

## 2019-04-15 NOTE — PROGRESS NOTES
Pt transferred from ED to 5271. Monitor applied, MW notified. Attempted to oriented pt to room, call light, hospital policy. Pt had no evidence of learning.

## 2019-04-15 NOTE — PROGRESS NOTES
4 Eyes Skin Assessment     The patient is being assess for  Admission    I agree that 2 RN's have performed a thorough Head to Toe Skin Assessment on the patient. ALL assessment sites listed below have been assessed. Areas assessed by both nurses: González Cam  [x]   Head, Face, and Ears   [x]   Shoulders, Back, and Chest  [x]   Arms, Elbows, and Hands   [x]   Coccyx, Sacrum, and IschIum  [x]   Legs, Feet, and Heels        Does the Patient have Skin Breakdown?   No         Cu[place]htemoc Prevention initiated:  Yes   Wound Care Orders initiated:  Yes      [address]  nurse consulted for Pressure Injury (Stage 3,4, Unstageable, DTI, NWPT, and Complex wounds), New and Established Ostomies: No     Nurse 1 eSignature: Electronically signed by Matt Floyd RN on 4/15/19 at 6:32 AM    **SHARE this note so that the co-signing nurse is able to place an eSignature**    Nurse 2 eSignature: Electronically signed by Juliano Christensen RN on 4/15/19 at 6:34 AM

## 2019-04-15 NOTE — PROGRESS NOTES
Physical Therapy    Facility/Department: [address] PROGRESSIVE CARE  Initial Assessment  (cotx)  If pt. is D/C'd prior to next visit please refer back to last daily progress note for D/C status. NAME: Joel Cooper  : 1929  MRN: [id number]    Date of Service: 4/15/2019    Discharge Recommendations:  Patient would benefit from continued therapy after discharge, 3-5 sessions per week   Joel Cooper scored a 9/24 on the AM-PAC short mobility form. Current research shows that an AM-PAC score of 17 or less is typically not associated with a discharge to the patient's home setting. Based on the patients AM-PAC score and their current functional mobility deficits, it is recommended that the patient have 3-5 sessions per week of Physical Therapy at d/c to increase the patients independence. PT Equipment Recommendations  Equipment Needed: No    Assessment   Assessment: The pt is a 81 yo female admitted from the F with HCAP, sepsis and hyperglycemia. The pt has been receiving therapy services at the [facility] since the beginning of the month but originally comes from home with her spouse and dgt. Her dgt provides all care and asisst her with ambulation with the walker, with toileting and has an aide for showering. The pt was receiving home PT prior to the F. PMH includes cataract, essential HTN, GERD, gouty arthropathy, breast Ca, lumbar spondylosis, DMII, Altz's dementia      Today, the pt presents as confused but willing to work with therapy; max A of 2 for bed mobility, mod A of 2 for transfers and needed mod A to stand briefly to a walker and in the stedy. The stedy would need to used to safely get the pt to the chair. Anticipate that the pt is functioning well below her baseline and is needing more assist than the family can provide at this time. Anticipate a return to the ECF for con't skilled PT services (although family is not happy with [facility] care at [facility]). Will con't to follow. Prognosis: Fair;Guarded  Decision Making: Medium Complexity  History: see above  Exam: see above  Clinical Presentation: evolving  Patient Education: role of acute care PT  Barriers to Learning: cog, [place]  REQUIRES PT FOLLOW UP: Yes  Activity Tolerance  Activity Tolerance: Patient limited by fatigue;Patient limited by cognitive status; Patient limited by endurance       Patient Diagnosis(es): The primary encounter diagnosis was HCAP (healthcare-associated pneumonia). Diagnoses of Sepsis, due to unspecified organism ([address] Lactic acidosis, Chronic kidney disease, unspecified CKD stage, Type 2 diabetes mellitus with hyperglycemia, unspecified whether long term insulin use ([address] Congestive heart failure, unspecified HF chronicity, unspecified heart failure type [facility]), Paroxysmal atrial fibrillation ([address] and Atrial flutter, paroxysmal ([address] were also pertinent to this visit. has a past medical history of Cataract, Chronic midline low back pain without sciatica, Essential hypertension, GERD (gastroesophageal reflux disease), Gouty arthropathy, History of breast cancer, Hypercholesterolemia, Incontinence, Lumbar spondylosis, Osteoporosis of multiple sites, Primary osteoarthritis involving multiple joints, Spinal stenosis, lumbar, and Type 2 diabetes mellitus without complication, without long-term current use of insulin ([address] has a past surgical history that includes Mastectomy; Cataract removal (jan 2012); Cataract removal (Nov. 2012); Cholecystectomy; Upper gastrointestinal endoscopy; Upper gastrointestinal endoscopy (N/A, 12/6/2018); Esophagus dilation (N/A, 12/6/2018); Upper gastrointestinal endoscopy (12/13/2018); Upper gastrointestinal endoscopy (N/A, 12/13/2018); and Esophagus dilation (N/A, 12/13/2018).     Restrictions  Restrictions/Precautions  Restrictions/Precautions: Fall Risk  Position Activity Restriction  Other position/activity restrictions: diet: dysphagia I pureed/ honey thick  Vision/Hearing  Vision: Within Functional Limits  Hearing: Exceptions to [facility]  Hearing Exceptions: Hard of hearing/hearing concerns; No hearing aid     Subjective  General  Chart Reviewed: Yes  Additional Pertinent Hx: Per Dr. Mamta Martinez, 4/15, \"This is a 80 y.o. female who presented to the ED on 4/15 with a CC of SOB. Per ED notes she was recently discharged from here with pneumonia, CHF and afib. She went to nursing home. She came back with SOB and admitted for HCAP among other things. She is not able to provide CC or HPI due to underlying dementia. \"  PMH includes cataract, essential HTN, GERD, gouty arthropathy, breast Ca, lumbar spondylosis, DMII, Altz's dementia  Response To Previous Treatment: Not applicable  Family / Caregiver Present: No  Referring Practitioner: Loc Flower MD  Referral Date : 04/15/19  Diagnosis: HCAP, sepsis, hyperglycemia  Follows Commands: Impaired  Subjective  Subjective: The pt was found to be in the bed with family in the room. The pt appears as pleasantly confused and has difficulty following directions. The pt had no pain c/o.    Pain Screening  Patient Currently in Pain: Denies          Orientation  Orientation  Overall Orientation Status: Impaired  Orientation Level: Disoriented to time;Oriented to person;Disoriented to place  Social/Functional History  Social/Functional History  Lives With: Spouse(and dgt (dgt works at night))  Type of Home: House  Home Layout: Able to Live on Main level with bedroom/bathroom  Home Access: Stairs to enter with rails, Ramped entrance  Entrance Stairs - Number of Steps: 5  Entrance Stairs - Rails: Both  Bathroom Shower/Tub: Walk-in shower, Shower chair with back  [employer]: Handicap height  Bathroom Equipment: Hand-held shower, Toilet raiser, Shower chair, Grab bars in shower, Grab bars around toilet  Bathroom Accessibility: Walker accessible  Home Equipment: 4 wheeled walker, Rolling walker(transport w/c)  Receives Help From: Family, Home health(HHPT 2x/week, HHN(qow) and HHA (2x/week))  ADL Assistance: Needs assistance(HHA assists with bathing 2x/week; dgt asissts with toileting (the pt uses depends))  Homemaking Assistance: (family does all)  Homemaking Responsibilities: No  Ambulation Assistance: Independent(uses the 2 wheeled walker and gait belt and dgt)  Transfer Assistance: Independent(sleep in recliner; dgt sometimes has to help her get out of the recliner)  Active : No  Mode of Transportation: Family  Additional Comments: the dgt reports she found her on the floor in March  Cognition   Cognition  Overall Cognitive Status: Exceptions  Arousal/Alertness: Delayed responses to stimuli  Following Commands: Inconsistently follows commands  Attention Span: Difficulty attending to directions  Problem Solving: Assistance required to generate solutions;Assistance required to implement solutions  Initiation: Requires cues for some  Sequencing: Requires cues for some    Objective  AROM RLE (degrees)  RLE General AROM: limited knee flexion, ankle DF to neutral  AROM LLE (degrees)  LLE General AROM: limited knee flexion, ankle DF to neutral  Strength RLE  Comment: functionally fair  Strength LLE  Comment: functionally fair         Bed mobility  Supine to Sit: Maximum assistance  Sit to Supine: Maximum assistance;2 Person assistance  Transfers  Sit to Stand:  Moderate Assistance;2 Person Assistance  Stand to sit: Moderate Assistance  Bed to Chair: Dependent/Total(with use of the stedy)  Ambulation  Ambulation?: No     Balance  Posture: Fair  Sitting - Static: Fair;-  Sitting - Dynamic: Poor  Standing - Static: Fair  Comments: briefly stood to the walker with mod A of 2, narrow NAILA with R foot turned inward; stood in the stedy with mod A of 1-2, more upright         Plan   Plan  Times per week: 3-5x/week  Current Treatment Recommendations: Functional Mobility Training  Safety Devices  Type of devices: Call light within reach, Gait belt, Patient at risk for falls, Left in bed, Nurse notified(RUTH ANN Schaffer aware)      AM-PAC Score  AM-PAC Inpatient Mobility Raw Score : 9  AM-PAC Inpatient T-Scale Score : 30.55  Mobility Inpatient CMS 0-100% Score: 81.38  Mobility Inpatient CMS G-Code Modifier : CM          Goals  Short term goals  Time Frame for Short term goals: upon d/c  Short term goal 1: Bed mobility with mod A of 1-2. Short term goal 2: Transfers sit <> stand with mod A of 1-2. Short term goal 3: Tolerate up to the chair with stedy with assist of 2. Short term goal 4: Ambulate with the walker 10 feet with mod A of 2.    Patient Goals   Patient goals : dgt would like for the pt to be able to go home       Therapy Time   Individual Concurrent Group Co-treatment   Time In 1520         Time Out 1623         Minutes 63         Timed Code Treatment Minutes: 48 Minutes       Electronically signed by Naheed Rodriguez, PT [id number] on 4/15/2019 at 4:23 PM

## 2019-04-15 NOTE — PROGRESS NOTES
home.    · Recent Chest Xray: 4/15/2019     Mild progressive perihilar opacities which may represent developing pulmonary   edema or progressive bronchopneumonia.         ·  has a past medical history of Cataract, Chronic midline low back pain without sciatica, Essential hypertension, GERD (gastroesophageal reflux disease), Gouty arthropathy, History of breast cancer, Hypercholesterolemia, Incontinence, Lumbar spondylosis, Osteoporosis of multiple sites, Primary osteoarthritis involving multiple joints, Spinal stenosis, lumbar, and Type 2 diabetes mellitus without complication, without long-term current use of insulin ([facility] History of Dysphagia dating back to 11/2018  · Pt is known to SLP Department; recent MBSS completed 4/1/2019 with recommendation for puree with nectar thick liquids 2/2 to Moderate Oropharyngeal Dysphagia characterized by labored mastication; reduced bolus formation and A-P oral transit; delayed swallow , decreased laryngeal elevation and decreased pharyngeal peristalsis. · Current Diet level:  Current Diet : NPO  Current Liquid Diet : NPO      Primary Complaint  NSG staff reported lethargy and holding of material in mouth      Reason for Referral  Brennen Patel was referred for a bedside swallow evaluation to assess the efficiency of her swallow function, identify signs and symptoms of aspiration and make recommendations regarding safe dietary consistencies, effective compensatory strategies, and safe eating environment. Assessment Impression  1. Pt was awake in bed. Pt was oriented partially to self only. Pt was able to follow one step commands >75% with visual cue/tactile cues PRN. 2. Oral care warranted as lips dry/tongue dry. Well tolerated and improved verbalizations, improved intelligibility, and volitional o-m rom.     3. Dysphagia Diagnosis: Moderate Oropharyngeal Dysphagia characterized by episodic holding of material in mouth; prolonged (2/2 to lingual mashing observed clinical signs of aspiration; The patient/caregiver will demonstrate understanding of compensatory strategies for improved swallowing safety. The patient will tolerate skilled trials of nectar thick liquids 10/10 without overt clinical s/s of penetration or aspiration with diet advancement when ready    General  Chart Reviewed: Yes  Behavior/Cognition: Alert; Cooperative;Confused;Pleasant mood  Respiratory Status: O2 via nasal cannula  Follows Directions: Simple(followed concrete 1 step commands >75%)  Dentition: Edentulous  Prior Dysphagia History: Pt known to department from 4/1/2019 admit. Pt had a MBSS at that time with recommendations for Dysphagia I puree with nectar thick liquids    Patient Positioning: Upright in bed    Consistencies Administered: Puree; Honey - cup;Honey - teaspoon;Nectar - cup;Nectar - teaspoon    Pain:  Pain Assessment  Pain Assessment: denied    Vision/Hearing  Vision  Vision: Within Functional Limits(for procedure)  Hearing  Hearing: Exceptions to [facility]  Hearing Exceptions: Hard of hearing/hearing concerns; No hearing aid    Oral Motor Deficits  Oral/Motor  Oral Motor: Exceptions to [facility]  Labial Strength: Reduced(reduced right)  Labial Coordination: Reduced  Lingual ROM: (reduced bilaterally)  Lingual Strength: Reduced  Lingual Sensation: Reduced  Lingual Coordination: Reduced  Gag: (present gag and palatal reflex)  Vocal Quality: (strong voice/soft volume)  Volitional Cough: Weak  Volitional Swallow: Delayed    Oral Phase Dysfunction  Oral Phase  Oral Phase: Exceptions  Oral Phase Dysfunction  Impaired Mastication: (DNT)  Spillage Right: (cup drinks right)  Decreased Anterior to Posterior Transit: (Episodic holding of material in mouth; lingual mashing pattern)  Suspected Premature Bolus Loss: (suspected for all consistencies presented this date/time)  Lingual/Palatal Residue: All(tract to mild all items presented)    Oral Phase - Comment: Pt was able to clear majority of oral residue      Indicators of Pharyngeal Phase Dysfunction   Pharyngeal Phase  Pharyngeal Phase: Exceptions  Indicators of Pharyngeal Phase Dysfunction  Delayed Swallow: All  Cough - Delayed: Nectar - cup    Prognosis  Prognosis  Prognosis for safe diet advancement: good  Barriers to reach goals: (guarded co-morbidities; cognitive)  Individuals consulted  Consulted and agree with results and recommendations: Patient; Family member  Family member consulted: spouse    Education  Patient Education Response: Verbalizes understanding; No evidence of learning  Safety Devices in place: Yes  Type of devices: Bed alarm in place;Call light within reach; Other (comment)(RN and spouse at the bedside)         Therapy Time  SLP Individual Minutes  Time In: 1155  Time Out: [address]  Minutes: 36    Yuliet DuranMS,CCC,SLP [id number]  Speech and Language Pathologist  4/15/2019 12:56 PM

## 2019-04-15 NOTE — PLAN OF CARE
Problem: Falls - Risk of:  Goal: Will remain free from falls  Description  Will remain free from falls  Outcome: Ongoing     Problem: Falls - Risk of:  Goal: Absence of physical injury  Description  Problem: Pain:  Goal: Pain level will decrease  Description  Pain level will decrease  Outcome: Ongoing     Problem: Pain:  Goal: Control of acute pain  Description  Control of acute pain  Outcome: Ongoing     Problem: Pain:  Goal: Control of chronic pain  Description  Control of chronic pain  Outcome: Ongoing  Pain/discomfort being managed with PRN analgesics per MD orders. Pt able to express presence and absence of pain and rate pain appropriately using numerical scale. Absence of physical injury  Outcome: Ongoing  Socks applied, call light in reach. Bed in lowest position. Problem: Risk for Impaired Skin Integrity  Goal: Tissue integrity - skin and mucous membranes  Description  Structural intactness and normal physiological function of skin and  mucous membranes. Outcome: Ongoing  Skin assessed at this time. No evidence of new breakdown at this time. Turn Q2 hour if necessary.

## 2019-04-15 NOTE — PROGRESS NOTES
Dr. Moshe Marinelli notified of positive resp. Panel for the coronavirus. No new orders at this time.

## 2019-04-15 NOTE — CONSULTS
PATIENT IS SEEN AT THE REQUEST OF DR. Liyah Moscoso for pneumonia vs pulmonary edema    CONSULTING PHYSICIAN: Liyah Moscoso    HISTORY OF PRESENT ILLNESS:  This is a 80 y.o. female who presented to the ED on 4/15 with a CC of SOb. Per ED notes she was recently discharged from here with pneumonia, CHF and afib. She went to nursing home. She came back with SOB and admitted for HCAP among other things.   She is not able to provide CC or HPI due to underlying dementia       Established Pulmonologist:  None    PAST MEDICAL HISTORY:  Past Medical History:   Diagnosis Date    Cataract     Chronic midline low back pain without sciatica     Essential hypertension     GERD (gastroesophageal reflux disease)     Gouty arthropathy     History of breast cancer     Hypercholesterolemia     Incontinence     Lumbar spondylosis     Osteoporosis of multiple sites     Primary osteoarthritis involving multiple joints     Spinal stenosis, lumbar     Type 2 diabetes mellitus without complication, without long-term current use of insulin ([facility]        PAST SURGICAL HISTORY:  Past Surgical History:   Procedure Laterality Date    CATARACT REMOVAL  jan 2012    dr Masood Henry left    CATARACT REMOVAL  Nov. 2012    dr Masood Henry  right    CHOLECYSTECTOMY      ESOPHAGEAL DILATATION N/A 12/6/2018    ESOPHAGEAL DILATION Adam Sutton performed by Ginna Rogel MD at [facility] N/A 12/13/2018    ESOPHAGEAL DILATION Adam Sutton performed by Ginna Rogel MD at [place] [address] ENDOSCOPY N/A 12/6/2018    EGD ESOPHAGOGASTRODUODENOSCOPY performed by Ginna Rogel MD at [facility]  12/13/2018    with [address] dil    UPPER GASTROINTESTINAL ENDOSCOPY N/A 12/13/2018    EGD DIAGNOSTIC ONLY performed by Ginna Rogel MD at [address] [facility] [address]:  Unable to obtain    SOCIAL HISTORY: reports that she has never smoked. She has never used smokeless tobacco.    Scheduled Meds:   allopurinol  100 mg Oral Daily    aspirin  81 mg Oral Every Other Day    colchicine  0.6 mg Oral Daily    escitalopram  10 mg Oral Daily    metoprolol succinate  25 mg Oral Daily    pantoprazole  40 mg Oral Daily    rivaroxaban  15 mg Oral Daily    sodium chloride flush  10 mL Intravenous 2 times per day    cefepime  1 g Intravenous Q12H       Continuous Infusions:      PRN Meds:  sodium chloride flush, perflutren lipid microspheres    ALLERGIES:  Patient has No Known Allergies. REVIEW OF SYSTEMS:  Unable to obtain due to mental status    PHYSICAL EXAM:  Blood pressure 130/78, pulse 70, temperature 97.8 °F (36.6 °C), temperature source Axillary, resp. rate 18, height 5' 2\" (1.575 m), weight 134 lb 4.2 oz (60.9 kg), SpO2 91 %.'  Gen: awake, non-verbal, demented   Eyes: PERRL. No sclera icterus. No conjunctival injection. ENT: No discharge. Pharynx clear. Neck: Trachea midline. No obvious mass. Resp: Mild accessory muscle use. Faint crackles   CV: Regular rate. Regular rhythm. No murmur or rub. GI: Non-tender. Non-distended. No hernia. BS present. Skin: Warm and dry. No nodule on exposed extremities. Lymph: No cervical LAD. No supraclavicular LAD. M/S: No cyanosis. No joint deformity. Neuro: Awake. Non-verbal, non-focal, dementia   EXT:   + edema, no clubbing    LABS:  CBC:   Recent Labs     04/15/19  0144   WBC 13.1*   HGB 12.7   HCT 40.9   MCV 86.2        BMP:   Recent Labs     04/15/19  0144      K 5.0      CO2 20*   BUN 40*   CREATININE 1.6*     LIVER PROFILE:   Recent Labs     04/15/19  0144   AST 15   ALT 26   BILITOT 0.5   ALKPHOS 269*     PT/INR: No results for input(s): PROTIME, INR in the last 72 hours. APTT: No results for input(s): APTT in the last 72 hours.   UA:No results for input(s): NITRITE, COLORU, PHUR, LABCAST, WBCUA, RBCUA, MUCUS, TRICHOMONAS, YEAST, BACTERIA, CLARITYU, SPECGRAV, LEUKOCYTESUR, UROBILINOGEN, BILIRUBINUR, BLOODU, GLUCOSEU, AMORPHOUS in the last 72 hours. Invalid input(s): KETONESU  No results for input(s): PHART, FDB7VBC, PO2ART in the last 72 hours. Cultures:  Pending     PFTs:  None      ECHO: 2016  Left ventricle size is normal. Normal left ventricular wall thickness. Left   ventricular function is low normal with ejection fraction estimated at   50-55%. No regional wall motion abnormalities are noted.   Thickening/calcification of leaflets of mitral valve. Mitral annular   calcification is present. Mild mitral regurgitation is present. The left   atrium is mildly dilated. VB.44    Chest X-ray:  Chest imaging was reviewed by me and showed bilateral infiltrates, effusions and interstitial edema. Worse than previous CXR    Chest CT: 3/2019  Chest imaging was reviewed by me and showed bilateral consodilations    I reviewed all the above labs and studies pertaining to this visit. ASSESSMENT:  · Acute Hypoxic Respiratory Failure with saturations less than 90% on room air  · Abnormal Radiograph  · Possible HCAP  · Dementia     PLAN:  · Procalcitionin  · Film Array  · MRSA probe  · Antigens  · Cultures   · Check sed rate, crp  · Continue with Cefepime  · DC Azithromycin due to QTc of 491  · One time dose of Vanco received  · ECHO  · May need diuresis  · DVT prophylaxis  · Needs palliative care    She had recent pneumonia with persistent radiographic findings. Of course pneumonia could still be present as it usually takes 6 weeks to resolve on imaging. We will have to rely on cultures and procalcitonin to see if she is infected because she cannot provide subjective complaints.   BNP is very high so of course there could be some cardiac involvement      Nay Casiano DO  [facility]

## 2019-04-15 NOTE — PROGRESS NOTES
Nurse did a swallow screen before giving morning meds and patient did not follow commands. Nurse made patient NPO, notified MD, and ordered a speech eval and treat. Family at bedside, will continue to monitor. Call light in reach.

## 2019-04-15 NOTE — CARE COORDINATION
Spoke to [facility] at [facility]. Pt was on their skilled unit and family has cleaned out the room. [facility] states she is unsure if family wants her to return Pt doesn't have a bed held. PT/OT has been ordered    Leonides Kaur, RN  Case management  [phone]

## 2019-04-16 LAB
BILIRUBIN URINE: NEGATIVE
BLOOD, URINE: ABNORMAL
CLARITY: CLEAR
COLOR: YELLOW
COMMENT UA: ABNORMAL
EKG ATRIAL RATE: 138 BPM
EKG DIAGNOSIS: NORMAL
EKG Q-T INTERVAL: 358 MS
EKG QRS DURATION: 82 MS
EKG QTC CALCULATION (BAZETT): 491 MS
EKG R AXIS: 39 DEGREES
EKG T AXIS: 152 DEGREES
EKG VENTRICULAR RATE: 113 BPM
EPITHELIAL CELLS, UA: 1 /HPF (ref 0–5)
GLUCOSE BLD-MCNC: 134 MG/DL (ref 70–99)
GLUCOSE BLD-MCNC: 208 MG/DL (ref 70–99)
GLUCOSE BLD-MCNC: 264 MG/DL (ref 70–99)
GLUCOSE BLD-MCNC: 80 MG/DL (ref 70–99)
GLUCOSE BLD-MCNC: 94 MG/DL (ref 70–99)
GLUCOSE URINE: NEGATIVE MG/DL
HYALINE CASTS: 5 /LPF (ref 0–8)
KETONES, URINE: NEGATIVE MG/DL
L. PNEUMOPHILA SEROGP 1 UR AG: NORMAL
LACTIC ACID: 2.3 MMOL/L (ref 0.4–2)
LACTIC ACID: 2.4 MMOL/L (ref 0.4–2)
LEUKOCYTE ESTERASE, URINE: ABNORMAL
MICROSCOPIC EXAMINATION: YES
MRSA SCREEN RT-PCR: NORMAL
NITRITE, URINE: NEGATIVE
PERFORMED ON: ABNORMAL
PERFORMED ON: NORMAL
PERFORMED ON: NORMAL
PH UA: 6 (ref 5–8)
PROCALCITONIN: 0.07 NG/ML (ref 0–0.15)
PROTEIN UA: NEGATIVE MG/DL
RBC UA: 54 /HPF (ref 0–4)
SPECIFIC GRAVITY UA: 1.01 (ref 1–1.03)
STREP PNEUMONIAE ANTIGEN, URINE: NORMAL
URINE REFLEX TO CULTURE: YES
URINE TYPE: ABNORMAL
UROBILINOGEN, URINE: 0.2 E.U./DL
WBC UA: 17 /HPF (ref 0–5)
YEAST: PRESENT /HPF

## 2019-04-16 PROCEDURE — 97127 HC SP THER IVNTJ W/FOCUS COG FUNCJ: CPT

## 2019-04-16 PROCEDURE — 83036 HEMOGLOBIN GLYCOSYLATED A1C: CPT

## 2019-04-16 PROCEDURE — 6360000002 HC RX W HCPCS: Performed by: HOSPITALIST

## 2019-04-16 PROCEDURE — 92526 ORAL FUNCTION THERAPY: CPT

## 2019-04-16 PROCEDURE — 36415 COLL VENOUS BLD VENIPUNCTURE: CPT

## 2019-04-16 PROCEDURE — 2580000003 HC RX 258: Performed by: INTERNAL MEDICINE

## 2019-04-16 PROCEDURE — 2700000000 HC OXYGEN THERAPY PER DAY

## 2019-04-16 PROCEDURE — 81001 URINALYSIS AUTO W/SCOPE: CPT

## 2019-04-16 PROCEDURE — 97530 THERAPEUTIC ACTIVITIES: CPT

## 2019-04-16 PROCEDURE — 84145 PROCALCITONIN (PCT): CPT

## 2019-04-16 PROCEDURE — 99232 SBSQ HOSP IP/OBS MODERATE 35: CPT | Performed by: INTERNAL MEDICINE

## 2019-04-16 PROCEDURE — 6360000002 HC RX W HCPCS: Performed by: INTERNAL MEDICINE

## 2019-04-16 PROCEDURE — 87086 URINE CULTURE/COLONY COUNT: CPT

## 2019-04-16 PROCEDURE — 94761 N-INVAS EAR/PLS OXIMETRY MLT: CPT

## 2019-04-16 PROCEDURE — 6370000000 HC RX 637 (ALT 250 FOR IP): Performed by: INTERNAL MEDICINE

## 2019-04-16 PROCEDURE — 51702 INSERT TEMP BLADDER CATH: CPT

## 2019-04-16 PROCEDURE — 83605 ASSAY OF LACTIC ACID: CPT

## 2019-04-16 PROCEDURE — 97535 SELF CARE MNGMENT TRAINING: CPT

## 2019-04-16 PROCEDURE — 6370000000 HC RX 637 (ALT 250 FOR IP): Performed by: HOSPITALIST

## 2019-04-16 PROCEDURE — 2060000000 HC ICU INTERMEDIATE R&B

## 2019-04-16 RX ORDER — INSULIN GLARGINE 100 [IU]/ML
10 INJECTION, SOLUTION SUBCUTANEOUS NIGHTLY
Status: DISCONTINUED | OUTPATIENT
Start: 2019-04-16 | End: 2019-04-17

## 2019-04-16 RX ORDER — DEXTROSE MONOHYDRATE 25 G/50ML
12.5 INJECTION, SOLUTION INTRAVENOUS PRN
Status: DISCONTINUED | OUTPATIENT
Start: 2019-04-16 | End: 2019-04-26 | Stop reason: HOSPADM

## 2019-04-16 RX ORDER — DEXTROSE MONOHYDRATE 50 MG/ML
100 INJECTION, SOLUTION INTRAVENOUS PRN
Status: DISCONTINUED | OUTPATIENT
Start: 2019-04-16 | End: 2019-04-26 | Stop reason: HOSPADM

## 2019-04-16 RX ORDER — NICOTINE POLACRILEX 4 MG
15 LOZENGE BUCCAL PRN
Status: DISCONTINUED | OUTPATIENT
Start: 2019-04-16 | End: 2019-04-26 | Stop reason: HOSPADM

## 2019-04-16 RX ORDER — FUROSEMIDE 10 MG/ML
40 INJECTION INTRAMUSCULAR; INTRAVENOUS 2 TIMES DAILY
Status: DISCONTINUED | OUTPATIENT
Start: 2019-04-16 | End: 2019-04-21

## 2019-04-16 RX ADMIN — ALLOPURINOL 100 MG: 100 TABLET ORAL at 09:12

## 2019-04-16 RX ADMIN — CEFEPIME HYDROCHLORIDE 1 G: 1 INJECTION, POWDER, FOR SOLUTION INTRAMUSCULAR; INTRAVENOUS at 15:32

## 2019-04-16 RX ADMIN — CEFEPIME HYDROCHLORIDE 1 G: 1 INJECTION, POWDER, FOR SOLUTION INTRAMUSCULAR; INTRAVENOUS at 03:55

## 2019-04-16 RX ADMIN — ESCITALOPRAM OXALATE 10 MG: 10 TABLET ORAL at 09:12

## 2019-04-16 RX ADMIN — RIVAROXABAN 15 MG: 15 TABLET, FILM COATED ORAL at 09:12

## 2019-04-16 RX ADMIN — METOPROLOL SUCCINATE 25 MG: 25 TABLET, EXTENDED RELEASE ORAL at 09:12

## 2019-04-16 RX ADMIN — Medication 10 ML: at 22:08

## 2019-04-16 RX ADMIN — INSULIN LISPRO 3 UNITS: 100 INJECTION, SOLUTION INTRAVENOUS; SUBCUTANEOUS at 18:55

## 2019-04-16 RX ADMIN — INSULIN LISPRO 6 UNITS: 100 INJECTION, SOLUTION INTRAVENOUS; SUBCUTANEOUS at 13:02

## 2019-04-16 RX ADMIN — FUROSEMIDE 40 MG: 10 INJECTION, SOLUTION INTRAMUSCULAR; INTRAVENOUS at 09:12

## 2019-04-16 RX ADMIN — COLCHICINE 0.6 MG: 0.6 TABLET, FILM COATED ORAL at 09:12

## 2019-04-16 RX ADMIN — FUROSEMIDE 40 MG: 10 INJECTION, SOLUTION INTRAMUSCULAR; INTRAVENOUS at 21:41

## 2019-04-16 RX ADMIN — INSULIN GLARGINE 10 UNITS: 100 INJECTION, SOLUTION SUBCUTANEOUS at 13:02

## 2019-04-16 RX ADMIN — INSULIN LISPRO 3 UNITS: 100 INJECTION, SOLUTION INTRAVENOUS; SUBCUTANEOUS at 13:03

## 2019-04-16 RX ADMIN — Medication 10 ML: at 09:12

## 2019-04-16 ASSESSMENT — PAIN SCALES - WONG BAKER
WONGBAKER_NUMERICALRESPONSE: 0
WONGBAKER_NUMERICALRESPONSE: 0

## 2019-04-16 ASSESSMENT — PAIN SCALES - GENERAL: PAINLEVEL_OUTOF10: 0

## 2019-04-16 NOTE — PROGRESS NOTES
Physical Therapy  Facility/Department: [address] PROGRESSIVE CARE  Daily Treatment Note/Cotx with OT   NAME: Vernon Juárez  : 1929  MRN: [id number]    Date of Service: 2019  Assessment: The pt is a 79 yo female admitted from the [facility] with HCAP, sepsis and hyperglycemia. The pt has been receiving therapy services at the [facility] since the beginning of the month but originally comes from home with her spouse and dgt. Her dgt provides all care and asisst her with ambulation with the walker, with toileting and has an aide for showering. The pt was receiving home PT prior to the [facility]. PMH includes cataract, essential HTN, GERD, gouty arthropathy, breast Ca, lumbar spondylosis, DMII, Altz's dementia      Again this date, pt participated in therapy session, but needed max A of 2 for bed mobility, mod-max A of 2 for transfers on/off stedy, and need to use safely to get the pt to the chair. Continue to anticipate that the pt is functioning well below her baseline and is needing more assist than the family can provide at this time. Anticipate a return to the [facility] for con't skilled PT services. Will con't to follow. Discharge Recommendations:  Patient would benefit from continued therapy after discharge, 3-5 sessions per week      Patient Diagnosis(es): The primary encounter diagnosis was HCAP (healthcare-associated pneumonia). Diagnoses of Sepsis, due to unspecified organism ([address] Lactic acidosis, Chronic kidney disease, unspecified CKD stage, Type 2 diabetes mellitus with hyperglycemia, unspecified whether long term insulin use ([address] Congestive heart failure, unspecified HF chronicity, unspecified heart failure type [facility]), Paroxysmal atrial fibrillation ([facility] and Atrial flutter, paroxysmal ([facility] were also pertinent to this visit.    has a past medical history of Cataract, Chronic midline low back pain without sciatica, Essential hypertension, GERD (gastroesophageal reflux disease), Gouty arthropathy, History of breast cancer, Hypercholesterolemia, Incontinence, Lumbar spondylosis, Osteoporosis of multiple sites, Primary osteoarthritis involving multiple joints, Spinal stenosis, lumbar, and Type 2 diabetes mellitus without complication, without long-term current use of insulin ([address] has a past surgical history that includes Mastectomy; Cataract removal (jan 2012); Cataract removal (Nov. 2012); Cholecystectomy; Upper gastrointestinal endoscopy; Upper gastrointestinal endoscopy (N/A, 12/6/2018); Esophagus dilation (N/A, 12/6/2018); Upper gastrointestinal endoscopy (12/13/2018); Upper gastrointestinal endoscopy (N/A, 12/13/2018); and Esophagus dilation (N/A, 12/13/2018). Restrictions  Restrictions/Precautions  Restrictions/Precautions: Fall Risk  Position Activity Restriction  Other position/activity restrictions: diet: dysphagia I pureed/ honey thick  Subjective   General  Chart Reviewed: Yes  Additional Pertinent Hx: Per Dr. Tea Jackman, 4/15, \"This is a 80 y.o. female who presented to the ED on 4/15 with a CC of SOB. Per ED notes she was recently discharged from here with pneumonia, CHF and afib. She went to nursing home. She came back with SOB and admitted for HCAP among other things. She is not able to provide CC or HPI due to underlying dementia. \"  PMH includes cataract, essential HTN, GERD, gouty arthropathy, breast Ca, lumbar spondylosis, DMII, Altz's dementia  Response To Previous Treatment: Patient with no complaints from previous session. Family / Caregiver Present: No  Referring Practitioner: Martha Rider MD  Subjective  Subjective: Pt was in supine, lightly sleeping, but awoke when spoken to. Agreed to therapy assisting her with getting up, and ordering breakfast.  Pt denied c/o pain during therapy session.        Orientation  Orientation  Overall Orientation Status: Impaired  Orientation Level: Disoriented to time;Oriented to person;Disoriented to place     Objective   Bed mobility  Supine to Sit: Moderate assistance;2 Person assistance(with HOB elevated somewhat, cues to assist as able.  )  Sit to Supine: Unable to assess(nt--pt up in recliner at end of session. )  Scooting: Moderate assistance(to eob for feet onto floor, cues to assist as able. )  Transfers  Sit to Stand: Moderate Assistance;2 Person Assistance;Maximum Assistance(x 4 trials up to jayant stedy unit with each stance.  )  Stand to sit: Moderate Assistance;2 Person Assistance;Maximum Assistance  Bed to Chair: Dependent/Total(via jayant stedy. )  Ambulation  Ambulation?: No     Balance  Posture: Fair  Sitting - Static: Fair  Sitting - Dynamic: Poor  Standing - Static: Poor  Standing - Dynamic: (tremaine)  Comments: sitting balance needed SBA at best, <>mod A; stance only in the stedy with mod/max A of 1-2 for balance/support. Comment: assisted cleaning up pt after 1st stance in jayant stedy, Dependent for all pericare/clean up prior to oob to recliner. After oob, pt reclined to comfort and assisted with breakfast set-up and some feeding. Nursing notified of pt's needs at end of session. Assessment   Body structures, Functions, Activity limitations: Decreased functional mobility   Assessment: The pt is a 79 yo female admitted from the [facility] with HCAP, sepsis and hyperglycemia. The pt has been receiving therapy services at the [facility] since the beginning of the month but originally comes from home with her spouse and dgt. Her dgt provides all care and asisst her with ambulation with the walker, with toileting and has an aide for showering. The pt was receiving home PT prior to the [facility]. PMH includes cataract, essential HTN, GERD, gouty arthropathy, breast Ca, lumbar spondylosis, DMII, Altz's dementia      Again this date, pt participated in therapy session, but needed max A of 2 for bed mobility, mod-max A of 2 for transfers on/off stedy, and need to use safely to get the pt to the chair.  Continue to anticipate that the pt is functioning well below her baseline and is needing more assist than the family can provide at this time. Anticipate a return to the [facility] for con't skilled PT services. Will con't to follow. Prognosis: Fair  Patient Education: role of acute care PT  REQUIRES PT FOLLOW UP: Yes  Activity Tolerance  Activity Tolerance: Patient limited by fatigue;Patient limited by cognitive status; Patient limited by endurance     AM-PAC Score  AM-PAC Inpatient Mobility Raw Score : 9  AM-PAC Inpatient T-Scale Score : 30.55  Mobility Inpatient CMS 0-100% Score: 81.38  Mobility Inpatient CMS G-Code Modifier : NICKY Hodges scored a 9/24 on the AM-PAC short mobility form. Current research shows that an AM-PAC score of 17 or less is typically not associated with a discharge to the patient's home setting. Based on the patients AM-PAC score and their current functional mobility deficits, it is recommended that the patient have 3-5 sessions per week of Physical Therapy at d/c to increase the patients independence. Goals  Short term goals  Time Frame for Short term goals: upon d/c--goals ongoing, with limited progress this date. Short term goal 1: Bed mobility with mod A of 1-2. Short term goal 2: Transfers sit <> stand with mod A of 1-2. Short term goal 3: Tolerate up to the chair with stedy with assist of 2. Short term goal 4: Ambulate with the walker 10 feet with mod A of 2.    Patient Goals   Patient goals : dgt would like for the pt to be able to go home    Plan    Plan  Times per week: 3-5x/wk in acute setting   Current Treatment Recommendations: Functional Mobility Training  Safety Devices  Type of devices: Call light within reach, Gait belt, Patient at risk for falls, Left in bed, Nurse notified     Therapy Time   Individual Concurrent Group Co-treatment   Time In 0815         Time Out [phone] 90         Minutes 43           If patient is discharged prior to the next Physical Therapy visit, please see last written PT note for discharge

## 2019-04-16 NOTE — CARE COORDINATION
Discharge Planning:  SW met with pt, pts spouse, pts dtr and pts grand dtr to discuss d/c plans. PTA pt was at [facility] for skilled care but family opted to not pay to hold the bed. Family stated that they would like for this pt to go to [facility] skilled upon d/c.  SW contacted Xiomara Smith at [facility] who stated that it should not be an issue accepting this pt back to the facility but she will review the information and get back with this SW.  Plan: Referral initiated with [facility] for skilled care. Pt was admitted from [place] [id number] but did not pay to hold the bed. Will need transport upon d/c.   Electronically signed by Nery Lim on 4/16/2019 at 2:29 PM

## 2019-04-16 NOTE — PROGRESS NOTES
Occupational Therapy  Facility/Department: [address] PROGRESSIVE CARE  Daily Treatment Note  NAME: Annia Du  : 1929  MRN: [id number]    Date of Service: 2019    Discharge Recommendations:  Patient would benefit from continued therapy after discharge, 3-5 sessions per week    Annia Du scored a  on the AM-PAC ADL Inpatient form. Current research shows that an AM-PAC score of 17 or less is typically not associated with a discharge to the patient's home setting. Based on the patients AM-PAC score and their current ADL deficits, it is recommended that the patient have 3-5 sessions per week of Occupational Therapy at d/c to increase the patients independence. Assessment   Performance deficits / Impairments: Decreased functional mobility ; Decreased high-level IADLs;Decreased ADL status; Decreased endurance;Decreased cognition;Decreased strength;Decreased balance;Decreased safe awareness  Assessment: Discussed with OTR: PT tolerted session fairly well, with above impairments, yet alert this am and requiring up to Mod A x2 for bed mob, transfers with Max A x2 sitz. ,stands and dep for bed to chair via [facility] stedy. Pt feeding self with Mod/Max A x1. Pt will benefit from cont OT at d/c to maximize function and decrease caregiver burden. Cont POC. Prognosis: Fair  Patient Education: Role of OT-pt with limited understanding-  [place] and hx of dementia  REQUIRES OT FOLLOW UP: Yes  Activity Tolerance  Activity Tolerance: Patient Tolerated treatment well;Treatment limited secondary to decreased cognition  Safety Devices  Safety Devices in place: Yes(RNAshley)  Type of devices: Left in chair;Call light within reach;Nurse notified; Chair alarm in place;Gait belt         Patient Diagnosis(es): The primary encounter diagnosis was HCAP (healthcare-associated pneumonia).  Diagnoses of Sepsis, due to unspecified organism ([facility] Lactic acidosis, Chronic kidney disease, unspecified CKD stage, Type 2 diabetes mellitus with hyperglycemia, unspecified whether long term insulin use ([facility] Congestive heart failure, unspecified HF chronicity, unspecified heart failure type [facility]), Paroxysmal atrial fibrillation ([facility] and Atrial flutter, paroxysmal ([facility] were also pertinent to this visit. has a past medical history of Cataract, Chronic midline low back pain without sciatica, Essential hypertension, GERD (gastroesophageal reflux disease), Gouty arthropathy, History of breast cancer, Hypercholesterolemia, Incontinence, Lumbar spondylosis, Osteoporosis of multiple sites, Primary osteoarthritis involving multiple joints, Spinal stenosis, lumbar, and Type 2 diabetes mellitus without complication, without long-term current use of insulin ([facility] has a past surgical history that includes Mastectomy; Cataract removal (jan 2012); Cataract removal (Nov. 2012); Cholecystectomy; Upper gastrointestinal endoscopy; Upper gastrointestinal endoscopy (N/A, 12/6/2018); Esophagus dilation (N/A, 12/6/2018); Upper gastrointestinal endoscopy (12/13/2018); Upper gastrointestinal endoscopy (N/A, 12/13/2018); and Esophagus dilation (N/A, 12/13/2018). Restrictions  Restrictions/Precautions  Restrictions/Precautions: Fall Risk  Position Activity Restriction  Other position/activity restrictions: diet: dysphagia I pureed/ honey thick  Subjective   General  Chart Reviewed: Yes  Patient assessed for rehabilitation services?: Yes  Family / Caregiver Present: No  Diagnosis: HCAP, sepsis, hyperglycemia  Subjective  Subjective: Pt metBS,in bed. Pt alert, mostly non-verbal(states name, date of birth). No indications of pain  General Comment  Comments: Pt [facility]      Orientation  Orientation  Overall Orientation Status: Impaired  Orientation Level: Oriented to person;Disoriented to time;Disoriented to place; Disoriented to situation  Objective    ADL  Feeding: Maximum assistance; Moderate assistance;Scoop assist;Increased time to complete;Bringing food to mouth assist;Verbal cueing(decreased intiation to start feeding. Once utensil placed in hand and scoop assist, very min A to bring to mouth, and to take drink from cup)  Toileting: Dependent/Total(incontinence of stool noted upon standing onto jayant stedy. Dep cleaning in stance on jayant stedy)        Balance  Sitting Balance: Stand by assistance(with UE support at EOB)  Standing Balance  Time: less than 10 sec, several times standing on jayant stedy  Activity: Max A x1 for brief stances on jayant stedy for toileting needs  Sit to stand: 2 Person assistance(Max A x2 sit><stands-bed,(elevated) and recliner(stand to sit). )  Comment: Maxi move pad under pt in recliner  Wheelchair Bed Transfers  Equipment Used: Bed(recliner)  Level of Asssistance: Dependent/Total  Wheelchair Transfers Comments: via jayant stedy  Bed mobility  Supine to Sit: Moderate assistance;2 Person assistance  Sit to Supine: 2 Person assistance; Moderate assistance  Scooting: Moderate assistance  Transfers  Sit to stand: 2 Person assistance(Max A x2 sit><stands-bed,(elevated) and recliner(stand to sit). )            Cognition  Overall Cognitive Status: Exceptions  Arousal/Alertness: Delayed responses to stimuli  Following Commands: Inconsistently follows commands  Attention Span: Difficulty attending to directions  Problem Solving: Assistance required to generate solutions;Assistance required to implement solutions  Initiation: Requires cues for some  Sequencing: Requires cues for some  Cognition Comment: [place]; hx Alz Dementia            Plan   Plan  Times per week: 3-5x  Current Treatment Recommendations: Functional Mobility Training, Equipment Evaluation, Education, & procurement, Patient/Caregiver Education & Training, Self-Care / ADL, Balance Training, Strengthening    AM-PAC Score        AM-[facility] Inpatient Daily Activity Raw Score: 9  AM-PAC Inpatient ADL T-Scale Score : 25.33  ADL Inpatient CMS 0-100% Score: 79.59  ADL Inpatient CMS G-Code Modifier

## 2019-04-16 NOTE — CARE COORDINATION
ADVANCED CARE PLANNING    Name:Nayeli Kam       :  1929              MRN:  [id number]      Purpose of Encounter: Advanced care planning in light of pneumonia. Parties in attendance: :Sil Up, Peyton Farfan MD,   Patient Decisional Capacity: Yes    Subjective/Patient Story: Patient understands that the functional status continues to deteriorate. Patient No longer wishes to continue with the resuscitative measures in case  Of Cardiopulmonary arrest          Goals of Care Determinations: Patient wishes Not to use  the life support measures,   In case of cardiac arrest.      Code Status: At this time patient wishes to be DNR CCA    Time Spent on Advanced Planning Documents: > 15 minutes    Advanced Care Planning Documents: Completed advances directives have been completed ---No        Electronically signed by Peyton Farfan MD on 4/15/2019 at 11:50 PM  Thank you Jairo Buerger, MD for the opportunity to be involved in this patient's care. If you have any questions or concerns please feel free to contact me at [phone].

## 2019-04-16 NOTE — PROGRESS NOTES
CREATININE 1.6*     LIVER PROFILE:   Recent Labs     04/15/19  0144   AST 15   ALT 26   BILITOT 0.5   ALKPHOS 269*     PT/INR: No results for input(s): PROTIME, INR in the last 72 hours. APTT: No results for input(s): APTT in the last 72 hours. UA:No results for input(s): NITRITE, COLORU, PHUR, LABCAST, WBCUA, RBCUA, MUCUS, TRICHOMONAS, YEAST, BACTERIA, CLARITYU, SPECGRAV, LEUKOCYTESUR, UROBILINOGEN, BILIRUBINUR, BLOODU, GLUCOSEU, AMORPHOUS in the last 72 hours. Invalid input(s): Signa Hillock  No results for input(s): PH, PCO2, PO2 in the last 72 hours. Films:  Chest imaging reports were reviewed and imaging was reviewed by me and showed no new films    ABG:  No new draws    Cultures:  MRSA probe:  Negative  Film array:  Coronavirus  Blood:  NGTD    I reviewed the labs and images listed above    ASSESSMENT:  · Acute Hypoxic Respiratory Failure with saturations less than 90% on room air  · Abnormal Radiograph  · Possible HCAP  · Dementia   · Prolonged QTc       PLAN:  · Procalcitionin (second request)  · Cefepime   · DC Azithromycin again.   Her QTc is prolonged  · One time dose of Vanco received and will not continue due to negative probe  · Needs diuresis  · DVT prophylaxis  · Needs palliative care          Brian Campos DO  [facility]

## 2019-04-16 NOTE — PROGRESS NOTES
Hospitalist Progress Note  4/16/2019 9:19 AM    PCP: Zahraa Willingham MD    [id number]     Date of Admission: 4/15/2019                                                                                                                     HOSPITAL COURSE    Patient demographics:  The patient  Brayan Robles is a 80 y.o. female     Significant past medical history:   Patient Active Problem List   Diagnosis    Primary osteoarthritis involving multiple joints    History of breast cancer    Chronic midline low back pain without sciatica    Essential hypertension    Cataract    Lumbar spondylosis    Spinal stenosis, lumbar    Gouty arthropathy    Hypercholesterolemia    CKD (chronic kidney disease) stage 3, GFR 30-59 ml/min ([facility])    Folate deficiency    Vitamin B12 deficiency    Dementia without behavioral disturbance    Esophageal foreign body    Prediabetes    CAP (community acquired pneumonia) due to Chlamydia species    CHF (congestive heart failure), NYHA class I, acute on chronic, combined ([address]    Typical atrial flutter ([facility])    Aspiration pneumonia ([address]    Sepsis ([address]    Acute respiratory failure with hypoxia ([address]    Abnormal radiograph         Presenting symptoms:  SOB    Diagnostic workup:      CONSULTS DURING ADMISSION :   IP CONSULT TO HOSPITALIST  IP CONSULT TO PULMONOLOGY      Patient was diagnosed with:        Treatment while inpatient:                                                                                         ----------------------------------------------------------      SUBJECTIVE COMPLAINTS-     Diet: DIET DYSPHAGIA I PUREED; Carb Control: 4 carb choices (60 gms)/meal; Dysphagia I Pureed;  Honey Thick      OBJECTIVE:   Patient Active Problem List   Diagnosis    Primary osteoarthritis involving multiple joints    History of breast cancer    Chronic midline low back pain without sciatica    Essential hypertension    Cataract    Lumbar spondylosis    Spinal stenosis, lumbar    Gouty arthropathy    Hypercholesterolemia    CKD (chronic kidney disease) stage 3, GFR 30-59 ml/min ([facility])    Folate deficiency    Vitamin B12 deficiency    Dementia without behavioral disturbance    Esophageal foreign body    Prediabetes    CAP (community acquired pneumonia) due to Chlamydia species    CHF (congestive heart failure), NYHA class I, acute on chronic, combined [facility]    Typical atrial flutter ([facility])    Aspiration pneumonia ([facility])    Sepsis ([facility]    Acute respiratory failure with hypoxia ([facility])    Abnormal radiograph       Allergies  Patient has no known allergies. Medications    Scheduled Meds:   allopurinol  100 mg Oral Daily    aspirin  81 mg Oral Every Other Day    colchicine  0.6 mg Oral Daily    escitalopram  10 mg Oral Daily    metoprolol succinate  25 mg Oral Daily    pantoprazole  40 mg Oral Daily    rivaroxaban  15 mg Oral Daily    sodium chloride flush  10 mL Intravenous 2 times per day    cefepime  1 g Intravenous Q12H    azithromycin  500 mg Intravenous Q24H    furosemide  40 mg Intravenous Daily     Continuous Infusions:  PRN Meds:  sodium chloride flush, perflutren lipid microspheres, perflutren lipid microspheres    Vitals   Vitals /wt   Patient Vitals for the past 8 hrs:   BP Temp Temp src Pulse Resp SpO2 Weight   04/16/19 0903 129/86 98.2 °F (36.8 °C) Oral 92 16 100 % --   04/16/19 0842 -- -- -- -- -- 90 % --   04/16/19 0409 -- -- -- -- -- (!) 83 % --   04/16/19 0357 128/78 97.7 °F (36.5 °C) Axillary 72 18 99 % 135 lb 9.3 oz (61.5 kg)        72HR INTAKE/OUTPUT:      Intake/Output Summary (Last 24 hours) at 4/16/2019 0919  Last data filed at 4/16/2019 0600  Gross per 24 hour   Intake 610 ml   Output 625 ml   Net -15 ml       Exam:    Gen:   Alert and oriented ×3   Eyes: PERRL. No sclera icterus. No conjunctival injection. ENT: No discharge. Pharynx clear. External appearance of ears and nose normal.  Neck: Trachea midline. No obvious mass. Resp: No accessory muscle use. No crackles. No wheezes. No rhonchi. CV: Regular rate. Regular rhythm. No murmur or rub. No edema. GI: Non-tender. Non-distended. No hernia. Skin: Warm, dry, normal texture and turgor. Lymph: No cervical LAD. No supraclavicular LAD. M/S: / Ext. No cyanosis. No clubbing. No joint deformity. Neuro: CN 2-12 are intact,  no neurologic deficits noted. PT/INR: No results for input(s): PROTIME, INR in the last 72 hours. APTT: No results for input(s): APTT in the last 72 hours. CBC:   Recent Labs     04/15/19  0144   WBC 13.1*   HGB 12.7   HCT 40.9   MCV 86.2          BMP:   Recent Labs     04/15/19  0144      K 5.0      CO2 20*   BUN 40*   CREATININE 1.6*       LIVER PROFILE:   Recent Labs     04/15/19  0144   ALKPHOS 269*   AST 15   ALT 26   BILITOT 0.5     No results for input(s): AMYLASE in the last 72 hours. No results for input(s): LIPASE in the last 72 hours. UA:No results for input(s): NITRITE, LABCAST, WBCUA, RBCUA, MUCUS in the last 72 hours. TROPONIN: No results for input(s): [place] in the last 72 hours. Lab Results   Component Value Date/Time    URRFLXCULT Yes 03/29/2019 12:53 PM       No results for input(s): TSHREFLEX in the last 72 hours. No components found for: [id number]  POC GLUCOSE:    Recent Labs     04/15/19  0735 04/15/19  1122 04/15/19  1702 04/16/19  0741   POCGLU 270* 256* 326* 208*     No results for input(s): LABA1C in the last 72 hours.    Lab Results   Component Value Date    LABA1C 6.2 07/16/2018         ASSESSMENT AND PLAN  Acute hypoxic respiratory failure with hypoxia   Titrate oxygen for saturations greater than or equal to 90%       pneumonia  Likely gram-negative  Healthcare associated pneumonia  continue patient on antibiotics and consult pulmonary  Continue on cefepime  Group health [employer] and is negative  Echocardiogram shows ejection fraction of 35-40%  Increase Lasix to twice a day     dysphagia  Speech therapy evaluation     Gouty arthritis  Continue on allopurinol and colchicine     Atrial fibrillation  Metoprolol and Xarelto        DVT and GI prophylaxis                    Code Status: Full Code        Dispo -continue care        The patient and / or the family were informed of the results of any tests, a time was given to answer questions, a plan was proposed and they agreed with plan. Edi Cotton MD    This note was transcribed using [id number] [employer]. Please disregard any translational errors.

## 2019-04-16 NOTE — PROGRESS NOTES
osteoarthritis involving multiple joints, Spinal stenosis, lumbar, and Type 2 diabetes mellitus without complication, without long-term current use of insulin ([facility] History of Dysphagia dating back to 11/2018  · Pt is known to SLP Department; recent MBSS completed 4/1/2019 with recommendation for puree with nectar thick liquids 2/2 to Moderate Oropharyngeal Dysphagia characterized by labored mastication; reduced bolus formation and A-P oral transit; delayed swallow , decreased laryngeal elevation and decreased pharyngeal peristalsis.             Subjective:     4/15/2019  Current Diet : NPO  Current Liquid Diet : NPO  Patient Currently in Pain: No    Current diet:  Pureed with honey thickened liquids via teaspoon. Comments regarding tolerating Current Diet: n/a        Objective:     Pain   Patient Currently in Pain: No    Cognitive/Behavior   Behavior/Cognition: Alert, Cooperative, Confused, Pleasant mood    Presentations   Consistencies Administered:  Honey - teaspoon    Positioning  Upright in chair      Dysphagia Tx: The focus of this session was to ensure diet tolerance and potential upgrade. Goals:   Dysphagia Goals:   1. The patient will tolerate recommended diet without observed clinical signs of aspiration. --ongoing. The pt accepted approximately 3 oz of honey water via teaspoon with 2 episodes of coughing. It is not completely clear if this was her normal cough or aspiration. Pt's voice remained clear during this presentations. 2. The patient/caregiver will demonstrate understanding of compensatory strategies for improved swallowing safety. --ongoing. Pt's daughter is very involved in her feeding and follows through with safe swallowing strategies well. Assessment:   Impressions:   Dysphagia Diagnosis: Moderate oral stage dysphagia, Moderate pharyngeal stage dysphagia     Diet Recommendations:  Con't a pureed diet with honey liquids via teaspoon.               Recommended Form of Meds: Crushed in puree as able    Strategies:   Compensatory Swallowing Strategies: Upright as possible for all oral intake, Assist feed, Swallow 2 times per bite/sip, Liquid by spoon only, No straws, Total feed, Remain upright for 30-45 minutes after meals(oral care post meals and med pass to ensure all oral pocketing cleared)    Education:  Consulted and agree with results and recommendations: Patient, Family member     Patient Education Response: Verbalizes understanding, No evidence of learning    Prognosis for swallowing:   Fair      Plan:     Continue Dysphagia Therapy: Yes  Interventions: Therapeutic Interventions: Diet tolerance monitoring, Patient/Family education  Duration/Frequency of therapy while on unit: Duration/Frequency of Treatment  Duration/Frequency of Treatment: 3-5 times whiel on acute medical floor. Anticipate will need additional therapy at SNF  Discharge Instructions:   Anticipate Yes__X__No__ for further skilled Speech Therapy for Dysphagia at discharge    This note serves as a D/C Summary in the event that this patient is discharged prior to the next therapy session.     Coded treatment time:  15 minutes  Total treatment time: 30 minutes    Electronically signed by Peace Hawkins M.A./[facility] #[id number] on 4/16/2019 at 11:46 AM

## 2019-04-16 NOTE — PLAN OF CARE
Problem: Falls - Risk of:  Goal: Will remain free from falls  Description  Will remain free from falls  Outcome: Ongoing  Note:   Fall risk precautions in place. Bed in lowest position with wheels locked,bed alarm in place and activated,non-skid socks on pt, fall risk ID on pt, call light in reach, will continue to monitor. Problem: Risk for Impaired Skin Integrity  Goal: Tissue integrity - skin and mucous membranes  Description  Structural intactness and normal physiological function of skin and  mucous membranes. Outcome: Ongoing  Note:   Skin assessment completed every shift. Pt assessed for incontinence, appropriate barrier cream applied prn. Pt encouraged to turn/rotate every 2 hours. Assistance provided if pt unable to do so themselves. Problem: OXYGENATION/RESPIRATORY FUNCTION  Goal: Patient will achieve/maintain normal respiratory rate/effort  Description  Respiratory rate and effort will be within normal limits for the patient  Outcome: Ongoing  Note:   2 L/min was applied to pt for desaturation prior to this RN's shift. Attempted to wean pt down to RA, but pt has intermittent apneic episodes. Pt able to remain greater than 90% on 1 L/min     Problem: HEMODYNAMIC STATUS  Goal: Patient has stable vital signs and fluid balance  Outcome: Ongoing  Note:   VSS. Pt with palpable peripheral pulses and has +2 pitting BLE edema and +1 non pitting BUE edema. Problem: FLUID AND ELECTROLYTE IMBALANCE  Goal: Fluid and electrolyte balance are achieved/maintained  Outcome: Ongoing  Note:   I&O strictly monitored.   Fluids offered to pt throughout evening but pt having trouble tolerating liquids with thickener     Problem: ACTIVITY INTOLERANCE/IMPAIRED MOBILITY  Goal: Mobility/activity is maintained at optimum level for patient  Outcome: Ongoing  Note:   Pt unable to make large movements without help      Problem: Pain:  Goal: Pain level will decrease  Description  Pain level will decrease  Outcome: Ongoing  Note:   Pain/discomfort being managed with PRN analgesics per MD orders.  This RN using Faces or FLACC scale for pain measurement as pt is unable to voice pain

## 2019-04-16 NOTE — PLAN OF CARE
Problem: Falls - Risk of:  Goal: Will remain free from falls  Description  Will remain free from falls  4/16/2019 1516 by Cliff Smalls RN  Outcome: Ongoing   Bed alarm kept on. Call light in reach. Side rails up x2. Pt reminded to use call light for assistance getting out of bed. Hourly rounding done to anticipate pt needs. Problem: Falls - Risk of:  Goal: Absence of physical injury  Description  Absence of physical injury  Outcome: Ongoing     Problem: Risk for Impaired Skin Integrity  Goal: Tissue integrity - skin and mucous membranes  Description  Structural intactness and normal physiological function of skin and  mucous membranes. 4/16/2019 1516 by Cliff Smalls RN  Outcome: Ongoing  Skin assessment completed every shift. Pt assessed for incontinence, appropriate barrier cream applied prn. Pt encouraged to turn/rotate every 2 hours. Assistance provided if pt unable to do so themselves.     Electronically signed by Cliff Smalls RN on 4/16/2019 at 3:17 PM

## 2019-04-16 NOTE — CONSULTS
Consult called for new A greta. Patient was here 3/30/2019 with atrial fibrillation and was seen by Dr. Shefali Quiñones. Rate is controlled. I spoke to Dr. Priscila Harris who stated consult was not necessary.   Andie ARROYO, RN [facility]

## 2019-04-17 ENCOUNTER — APPOINTMENT (OUTPATIENT)
Dept: GENERAL RADIOLOGY | Age: 84
DRG: 871 | End: 2019-04-17
Payer: MEDICARE

## 2019-04-17 LAB
ANION GAP SERPL CALCULATED.3IONS-SCNC: 12 MMOL/L (ref 3–16)
BUN BLDV-MCNC: 34 MG/DL (ref 7–20)
CALCIUM SERPL-MCNC: 8.5 MG/DL (ref 8.3–10.6)
CHLORIDE BLD-SCNC: 104 MMOL/L (ref 99–110)
CO2: 32 MMOL/L (ref 21–32)
CREAT SERPL-MCNC: 1 MG/DL (ref 0.6–1.2)
ESTIMATED AVERAGE GLUCOSE: 200.1 MG/DL
GFR AFRICAN AMERICAN: >60
GFR NON-AFRICAN AMERICAN: 52
GLUCOSE BLD-MCNC: 154 MG/DL (ref 70–99)
GLUCOSE BLD-MCNC: 224 MG/DL (ref 70–99)
GLUCOSE BLD-MCNC: 81 MG/DL (ref 70–99)
GLUCOSE BLD-MCNC: 82 MG/DL (ref 70–99)
GLUCOSE BLD-MCNC: 86 MG/DL (ref 70–99)
HBA1C MFR BLD: 8.6 %
HCT VFR BLD CALC: 38.7 % (ref 36–48)
HEMOGLOBIN: 12.2 G/DL (ref 12–16)
LACTIC ACID: 1.5 MMOL/L (ref 0.4–2)
LACTIC ACID: 1.8 MMOL/L (ref 0.4–2)
MCH RBC QN AUTO: 26.9 PG (ref 26–34)
MCHC RBC AUTO-ENTMCNC: 31.6 G/DL (ref 31–36)
MCV RBC AUTO: 85.2 FL (ref 80–100)
PDW BLD-RTO: 19.4 % (ref 12.4–15.4)
PERFORMED ON: ABNORMAL
PERFORMED ON: ABNORMAL
PERFORMED ON: NORMAL
PERFORMED ON: NORMAL
PLATELET # BLD: 245 K/UL (ref 135–450)
PMV BLD AUTO: 9.6 FL (ref 5–10.5)
POTASSIUM SERPL-SCNC: 2.9 MMOL/L (ref 3.5–5.1)
POTASSIUM SERPL-SCNC: 3.5 MMOL/L (ref 3.5–5.1)
RBC # BLD: 4.55 M/UL (ref 4–5.2)
SODIUM BLD-SCNC: 148 MMOL/L (ref 136–145)
URINE CULTURE, ROUTINE: NORMAL
WBC # BLD: 13.2 K/UL (ref 4–11)

## 2019-04-17 PROCEDURE — 83605 ASSAY OF LACTIC ACID: CPT

## 2019-04-17 PROCEDURE — 2580000003 HC RX 258: Performed by: INTERNAL MEDICINE

## 2019-04-17 PROCEDURE — 6370000000 HC RX 637 (ALT 250 FOR IP): Performed by: HOSPITALIST

## 2019-04-17 PROCEDURE — 80048 BASIC METABOLIC PNL TOTAL CA: CPT

## 2019-04-17 PROCEDURE — 84132 ASSAY OF SERUM POTASSIUM: CPT

## 2019-04-17 PROCEDURE — 85027 COMPLETE CBC AUTOMATED: CPT

## 2019-04-17 PROCEDURE — 97127 HC SP THER IVNTJ W/FOCUS COG FUNCJ: CPT

## 2019-04-17 PROCEDURE — 97110 THERAPEUTIC EXERCISES: CPT

## 2019-04-17 PROCEDURE — 99232 SBSQ HOSP IP/OBS MODERATE 35: CPT | Performed by: INTERNAL MEDICINE

## 2019-04-17 PROCEDURE — 36415 COLL VENOUS BLD VENIPUNCTURE: CPT

## 2019-04-17 PROCEDURE — 94761 N-INVAS EAR/PLS OXIMETRY MLT: CPT

## 2019-04-17 PROCEDURE — 2060000000 HC ICU INTERMEDIATE R&B

## 2019-04-17 PROCEDURE — 92526 ORAL FUNCTION THERAPY: CPT

## 2019-04-17 PROCEDURE — 6360000002 HC RX W HCPCS: Performed by: INTERNAL MEDICINE

## 2019-04-17 PROCEDURE — 6360000002 HC RX W HCPCS: Performed by: HOSPITALIST

## 2019-04-17 PROCEDURE — 71045 X-RAY EXAM CHEST 1 VIEW: CPT

## 2019-04-17 PROCEDURE — 2700000000 HC OXYGEN THERAPY PER DAY

## 2019-04-17 PROCEDURE — 97530 THERAPEUTIC ACTIVITIES: CPT

## 2019-04-17 PROCEDURE — 6370000000 HC RX 637 (ALT 250 FOR IP): Performed by: INTERNAL MEDICINE

## 2019-04-17 RX ORDER — POTASSIUM CHLORIDE 7.45 MG/ML
10 INJECTION INTRAVENOUS PRN
Status: DISCONTINUED | OUTPATIENT
Start: 2019-04-17 | End: 2019-04-22

## 2019-04-17 RX ADMIN — INSULIN LISPRO 2 UNITS: 100 INJECTION, SOLUTION INTRAVENOUS; SUBCUTANEOUS at 12:14

## 2019-04-17 RX ADMIN — CEFEPIME HYDROCHLORIDE 1 G: 1 INJECTION, POWDER, FOR SOLUTION INTRAMUSCULAR; INTRAVENOUS at 03:42

## 2019-04-17 RX ADMIN — INSULIN LISPRO 3 UNITS: 100 INJECTION, SOLUTION INTRAVENOUS; SUBCUTANEOUS at 12:15

## 2019-04-17 RX ADMIN — POTASSIUM CHLORIDE 10 MEQ: 7.46 INJECTION, SOLUTION INTRAVENOUS at 11:18

## 2019-04-17 RX ADMIN — ESCITALOPRAM OXALATE 10 MG: 10 TABLET ORAL at 08:40

## 2019-04-17 RX ADMIN — INSULIN LISPRO 3 UNITS: 100 INJECTION, SOLUTION INTRAVENOUS; SUBCUTANEOUS at 17:21

## 2019-04-17 RX ADMIN — COLCHICINE 0.6 MG: 0.6 TABLET, FILM COATED ORAL at 08:40

## 2019-04-17 RX ADMIN — POTASSIUM CHLORIDE 10 MEQ: 7.46 INJECTION, SOLUTION INTRAVENOUS at 16:44

## 2019-04-17 RX ADMIN — FUROSEMIDE 40 MG: 10 INJECTION, SOLUTION INTRAMUSCULAR; INTRAVENOUS at 08:41

## 2019-04-17 RX ADMIN — POTASSIUM CHLORIDE 10 MEQ: 7.46 INJECTION, SOLUTION INTRAVENOUS at 10:13

## 2019-04-17 RX ADMIN — RIVAROXABAN 15 MG: 15 TABLET, FILM COATED ORAL at 08:40

## 2019-04-17 RX ADMIN — Medication 10 ML: at 08:42

## 2019-04-17 RX ADMIN — CEFEPIME HYDROCHLORIDE 1 G: 1 INJECTION, POWDER, FOR SOLUTION INTRAMUSCULAR; INTRAVENOUS at 17:52

## 2019-04-17 RX ADMIN — ASPIRIN 81 MG 81 MG: 81 TABLET ORAL at 08:40

## 2019-04-17 RX ADMIN — INSULIN LISPRO 4 UNITS: 100 INJECTION, SOLUTION INTRAVENOUS; SUBCUTANEOUS at 17:21

## 2019-04-17 RX ADMIN — POTASSIUM CHLORIDE 10 MEQ: 7.46 INJECTION, SOLUTION INTRAVENOUS at 18:33

## 2019-04-17 RX ADMIN — POTASSIUM CHLORIDE 10 MEQ: 7.46 INJECTION, SOLUTION INTRAVENOUS at 15:27

## 2019-04-17 RX ADMIN — PANTOPRAZOLE SODIUM 40 MG: 40 TABLET, DELAYED RELEASE ORAL at 05:42

## 2019-04-17 RX ADMIN — METOPROLOL SUCCINATE 25 MG: 25 TABLET, EXTENDED RELEASE ORAL at 08:40

## 2019-04-17 RX ADMIN — Medication 10 ML: at 20:00

## 2019-04-17 RX ADMIN — FUROSEMIDE 40 MG: 10 INJECTION, SOLUTION INTRAMUSCULAR; INTRAVENOUS at 18:27

## 2019-04-17 RX ADMIN — INSULIN GLARGINE 10 UNITS: 100 INJECTION, SOLUTION SUBCUTANEOUS at 21:49

## 2019-04-17 RX ADMIN — ALLOPURINOL 100 MG: 100 TABLET ORAL at 08:40

## 2019-04-17 RX ADMIN — POTASSIUM CHLORIDE 10 MEQ: 7.46 INJECTION, SOLUTION INTRAVENOUS at 12:13

## 2019-04-17 ASSESSMENT — PAIN SCALES - GENERAL: PAINLEVEL_OUTOF10: 0

## 2019-04-17 NOTE — PROGRESS NOTES
Occupational Therapy  Facility/Department: [address] PROGRESSIVE CARE  Daily Treatment Note  NAME: Ruddy Wiley  : 1929  MRN: [id number]    Date of Service: 2019    Discharge Recommendations:  Patient would benefit from continued therapy after discharge, 3-5 sessions per week     Ruddy Wiley scored a 10/24 on the AM-PAC ADL Inpatient form. Current research shows that an AM-PAC score of 17 or less is typically not associated with a discharge to the patient's home setting. Based on the patients AM-PAC score and their current ADL deficits, it is recommended that the patient have 3-5 sessions per week of Occupational Therapy at d/c to increase the patients independence. Assessment   Performance deficits / Impairments: Decreased functional mobility ; Decreased high-level IADLs;Decreased ADL status; Decreased endurance;Decreased cognition;Decreased strength;Decreased balance;Decreased safe awareness  Assessment: Discussed with OTR: PT tolerated session fairly well, with above impairments, yet alert this am and requiring up to Max A x1-2 for bed mob, transfers with Max A x2 sit><stands and dep for bed to chair via [facility] stedy. Pt feeding self with set up today(per family report). Pt will benefit from cont OT at d/c to maximize function and decrease caregiver burden. Cont POC. Prognosis: Fair  Patient Education: Role of OT-pt with limited understanding-  [place] and hx of dementia  REQUIRES OT FOLLOW UP: Yes  Activity Tolerance  Activity Tolerance: Patient Tolerated treatment well;Treatment limited secondary to decreased cognition  Safety Devices  Safety Devices in place: Yes(RNAhsley)  Type of devices: Left in chair;Call light within reach;Nurse notified; Chair alarm in place;Gait belt         Patient Diagnosis(es): The primary encounter diagnosis was HCAP (healthcare-associated pneumonia).  Diagnoses of Sepsis, due to unspecified organism ([facility] Lactic acidosis, Chronic kidney disease, unspecified CKD stage, Type 2 diabetes mellitus with hyperglycemia, unspecified whether long term insulin use ([facility] Congestive heart failure, unspecified HF chronicity, unspecified heart failure type [facility]), Paroxysmal atrial fibrillation ([facility] and Atrial flutter, paroxysmal ([facility] were also pertinent to this visit. has a past medical history of Cataract, Chronic midline low back pain without sciatica, Essential hypertension, GERD (gastroesophageal reflux disease), Gouty arthropathy, History of breast cancer, Hypercholesterolemia, Incontinence, Lumbar spondylosis, Osteoporosis of multiple sites, Primary osteoarthritis involving multiple joints, Spinal stenosis, lumbar, and Type 2 diabetes mellitus without complication, without long-term current use of insulin ([facility] has a past surgical history that includes Mastectomy; Cataract removal (jan 2012); Cataract removal (Nov. 2012); Cholecystectomy; Upper gastrointestinal endoscopy; Upper gastrointestinal endoscopy (N/A, 12/6/2018); Esophagus dilation (N/A, 12/6/2018); Upper gastrointestinal endoscopy (12/13/2018); Upper gastrointestinal endoscopy (N/A, 12/13/2018); and Esophagus dilation (N/A, 12/13/2018). Restrictions  Restrictions/Precautions  Restrictions/Precautions: Fall Risk  Position Activity Restriction  Other position/activity restrictions: diet: dysphagia I pureed/ honey thick  Subjective   General  Chart Reviewed: Yes  Patient assessed for rehabilitation services?: Yes  Family / Caregiver Present: Yes(spouse, dgt, granddgt)  Diagnosis: HCAP, sepsis, hyperglycemia  Subjective  Subjective: Pt seen BS, in bed. Pt alert. Family reporting pt fed self without difficulty today. Pt alert, mostly non-verbal, occasionally stating 1-2 words. Stated \"all over\" when asked where hurting with standing.  No pain number  General Comment  Comments: Pt [place]      Orientation  Orientation  Overall Orientation Status: Impaired  Orientation Level: Oriented to person;Disoriented to time;Disoriented to place; Disoriented to situation  Objective    ADL  Toileting: Dependent/Total(min incontinence of stool. Dep in stance on jayant stedy for cleaning)        Balance  Sitting Balance: Stand by assistance(without UE support today, at EOB)  Standing Balance  Time: less than 15 sec several times standing, at bed, on jayant stedy, and 1x standing at bed rail  Activity: Max A x1 to assist x2 for upright standing. Pt's R knee buckling   Wheelchair Bed Transfers  Equipment Used: Bed(recliner)  Level of Asssistance: Dependent/Total  Wheelchair Transfers Comments: via jayant stedy  Bed mobility  Supine to Sit: Maximum assistance  Scooting: Maximal assistance;2 Person assistance(to EOB;  assist x2 for scooting back into recliner)            Cognition  Overall Cognitive Status: Exceptions  Arousal/Alertness: Delayed responses to stimuli  Following Commands: Inconsistently follows commands  Attention Span: Difficulty attending to directions  Problem Solving: Assistance required to generate solutions;Assistance required to implement solutions  Initiation: Requires cues for some  Sequencing: Requires cues for some  Cognition Comment: [place]; hx Alz Dementia         Returned to room to cont tx. Pt in recliner, states, \"tired\". Pt completed Ue ex of batting balloon, mostly with RUe, 5-10 reps x3, frequent rests. Transfers- attempted sit>stand to jayant stedy, x3 with Max A x2,  and unable to complete as pt increased fatigued with multiple attempts (PCA prsent and pt incontinent of BM-dep fr cleaning). Pt requireed use of Maxi move for back to bed. Cont with Poc.         Plan   Plan  Times per week: 3-5x  Current Treatment Recommendations: Functional Mobility Training, Equipment Evaluation, Education, & procurement, Patient/Caregiver Education & Training, Self-Care / ADL, Balance Training, Strengthening    AM-PAC Score        AM-PAC Inpatient Daily Activity Raw Score: 10  AM-PAC Inpatient ADL T-Scale Score : 27.31  ADL Inpatient CMS 0-100% Score: 74.7  ADL Inpatient CMS G-Code Modifier : CL    Goals  Short term goals  Time Frame for Short term goals: Status: goals ongoing  Short term goal 1: Mod A for feeding and grooming- goal met for feeding  Short term goal 2: Max A for bathing and dressing  Short term goal 3: Max A for bed mobility  Short term goal 4:  Max A for pivot transfers  Patient Goals   Patient goals : pt unable to make goal       Therapy Time   Individual Concurrent Group Co-treatment   Time In 1315         Time Out 1345         Minutes 30           Therapy Time     Individual Co-treatment   Time In 1535     Time Out 1600     Minutes 25         Tyrese TURCIOS/L,[id number]  This note to serve as discharge summary if pt d/c'd prior to next session

## 2019-04-17 NOTE — PROGRESS NOTES
Reviewed: Yes  Additional Pertinent Hx: Per Dr. Mamta Martinez, 4/15, \"This is a 80 y.o. female who presented to the ED on 4/15 with a CC of SOB. Per ED notes she was recently discharged from here with pneumonia, CHF and afib. She went to nursing home. She came back with SOB and admitted for HCAP among other things. She is not able to provide CC or HPI due to underlying dementia. \"  PMH includes cataract, essential HTN, GERD, gouty arthropathy, breast Ca, lumbar spondylosis, DMII, Altz's dementia  Family / Caregiver Present: Yes(, jovita & granddaughter)  Referring Practitioner: Loc Flower MD  Subjective  Subjective: Pt in supine, family reporting pt better able to feed herself today. Pt awake & alert, but quiet, agreed to getting up with therapy. Orientation  Orientation  Overall Orientation Status: Impaired  Orientation Level: Disoriented to time;Oriented to person;Disoriented to place     Objective   Bed mobility  Supine to Sit: Maximum assistance  Sit to Supine: Unable to assess(nt--pt up in recliner at end of session. )  Scooting: Maximal assistance(scooting to eob, A x 2 scooting back into recliner at end of session.  )  Transfers  Sit to Stand: 2 Person Assistance;Maximum Assistance(x 4 trials--2 from eob, 2 from recliner. 2 stances at sarastedy needed max A x 2 persons, with pt having much difficulty obtaiing upright stance. needing max A to achieve. Stance at bedrail x 2 reps with max A x 2, stance times all 15 seconds with much )  Stand to sit: 2 Person Assistance;Maximum Assistance  Bed to Chair: Dependent/Total(via jayant stedy bed>recliner)  Ambulation  Ambulation?: No     Balance  Sitting - Static: -;Good  Sitting - Dynamic: Fair;+  Standing - Static: Fair  Standing - Dynamic: (tremaine)  Comments: sitting balance needed SBA once at eob; stance only in the stedy/at bedrail with max A of 1-2 for balance/support. Comment: Assusted agaub for pericare & clean up prior to oob to recliner. Pt left oob in recliner with call light & needs in reach at end of session. Assessment   Body structures, Functions, Activity limitations: Decreased functional mobility   Assessment: The pt is a 81 yo female admitted from the [facility] with HCAP, sepsis and hyperglycemia. The pt has been receiving therapy services at the [facility] since the beginning of the month but originally comes from home with her spouse and dgt. Her dgt provides all care and asisst her with ambulation with the walker, with toileting and has an aide for showering. The pt was receiving home PT prior to the [facility]. PMH includes cataract, essential HTN, GERD, gouty arthropathy, breast Ca, lumbar spondylosis, DMII, Altz's dementia      Again this date, pt participated in therapy session, but needed max A of 1 for bed mobility, mod-max A of 2 for transfers on/off stedy and to/from bedrail. Stance times are only x 15 seconds time. Continue to anticipate that the pt is functioning well below her baseline and is needing more assist than the family can provide at this time. Anticipate a return to the [facility] for con't skilled PT services. Will con't to follow. Prognosis: Fair  Patient Education: role of acute care PT  REQUIRES PT FOLLOW UP: Yes  Activity Tolerance  Activity Tolerance: Patient limited by cognitive status; Patient limited by endurance                AM-PAC Score  AM-PAC Inpatient Mobility Raw Score : 9  AM-[facility] Inpatient T-Scale Score : 30.55  Mobility Inpatient CMS 0-100% Score: 81.38  Mobility Inpatient CMS G-Code Modifier : NICKY Hester scored a 9/24 on the AM-PAC short mobility form. Current research shows that an AM-PAC score of 17 or less is typically not associated with a discharge to the patient's home setting. Based on the patients AM-PAC score and their current functional mobility deficits, it is recommended that the patient have 3-5 sessions per week of Physical Therapy at d/c to increase the patients independence. Goals  Short term goals  Time Frame for Short term goals: upon d/c--goals ongoing, with limited progress this date. Short term goal 1: Bed mobility with mod A of 1-2. Short term goal 2: Transfers sit <> stand with mod A of 1-2. Short term goal 3: Tolerate up to the chair with stedy with assist of 2---MET 2/17/19. Short term goal 4: Ambulate with the walker 10 feet with mod A of 2. Patient Goals   Patient goals : dgt would like for the pt to be able to go home    Plan    Plan  Times per week: 3-5x/wk in acute setting   Current Treatment Recommendations: Functional Mobility Training  Safety Devices  Type of devices: Call light within reach, Gait belt, Nurse notified, Left in chair, Chair alarm in place     Therapy Time   Individual Concurrent Group Co-treatment   Time In 1315         Time Out 1345         Minutes 30           If patient is discharged prior to the next Physical Therapy visit, please see last written PT note for discharge status.     Xena Stinson PT Electronically signed by Xena Stinson PT on 4/17/2019 at 1:54 PM

## 2019-04-17 NOTE — PROGRESS NOTES
Pulmonary Progress Note    CC:  Follow up hypoxia,  Abnormal CT Chest    Subjective:  I liter of oxygen  Diuresing well  Sleeping     Intake/Output Summary (Last 24 hours) at 4/17/2019 0816  Last data filed at 4/17/2019 0446  Gross per 24 hour   Intake 460 ml   Output 3350 ml   Net -2890 ml         PHYSICAL EXAM:  Blood pressure 129/81, pulse 71, temperature 97.3 °F (36.3 °C), temperature source Temporal, resp. rate 20, height 5' 2\" (1.575 m), weight 127 lb 3.3 oz (57.7 kg), SpO2 97 %.'  Gen: No distress. Sleeping   Eyes: PERRL. No sclera icterus. No conjunctival injection. ENT: No discharge. Pharynx clear. External appearance of ears and nose normal.  Neck: Trachea midline. No obvious mass. Resp: Fine crackles   CV: Regular rate. Regular rhythm. No murmur or rub. GI: Non-tender. Non-distended. No hernia. Skin: Warm, dry, normal texture and turgor. No nodule on exposed extremities. Lymph: No cervical LAD. No supraclavicular LAD. M/S: No cyanosis. No clubbing. No joint deformity.     Neuro: Lethargic, more talkative, non-focal  Ext:   trace edema    Medications:    Scheduled Meds:   insulin lispro  0-12 Units Subcutaneous TID WC    insulin lispro  0-6 Units Subcutaneous Nightly    insulin glargine  10 Units Subcutaneous Nightly    insulin lispro  3 Units Subcutaneous TID WC    furosemide  40 mg Intravenous BID    allopurinol  100 mg Oral Daily    aspirin  81 mg Oral Every Other Day    colchicine  0.6 mg Oral Daily    escitalopram  10 mg Oral Daily    metoprolol succinate  25 mg Oral Daily    pantoprazole  40 mg Oral Daily    rivaroxaban  15 mg Oral Daily    sodium chloride flush  10 mL Intravenous 2 times per day    cefepime  1 g Intravenous Q12H       Continuous Infusions:   dextrose         PRN Meds:  glucose, dextrose, glucagon (rDNA), dextrose, sodium chloride flush, perflutren lipid microspheres, perflutren lipid microspheres    Labs:  CBC:   Recent Labs     04/15/19  0141 04/17/19  0612   WBC 13.1* 13.2*   HGB 12.7 12.2   HCT 40.9 38.7   MCV 86.2 85.2    245     BMP:   Recent Labs     04/15/19  0144 04/17/19  0611    148*   K 5.0 2.9*    104   CO2 20* 32   BUN 40* 34*   CREATININE 1.6* 1.0     LIVER PROFILE:   Recent Labs     04/15/19  0144   AST 15   ALT 26   BILITOT 0.5   ALKPHOS 269*     PT/INR: No results for input(s): PROTIME, INR in the last 72 hours. APTT: No results for input(s): APTT in the last 72 hours. UA:  Recent Labs     04/16/19  1120   COLORU YELLOW   PHUR 6.0   WBCUA 17*   RBCUA 54*   YEAST Present*   CLARITYU Clear   SPECGRAV 1.008   LEUKOCYTESUR SMALL*   UROBILINOGEN 0.2   BILIRUBINUR Negative   BLOODU MODERATE*   GLUCOSEU Negative     No results for input(s): PH, PCO2, PO2 in the last 72 hours.         Films:  Chest imaging reports were reviewed and imaging was reviewed by me and showed no new films    ABG:  No new draws    Cultures:  MRSA probe:  Negative  Film array:  Coronavirus  Blood:  NGTD    I reviewed the labs and images listed above    ASSESSMENT:  · Acute Hypoxic Respiratory Failure with saturations less than 90% on room air  · Abnormal Radiograph  · Possible HCAP  · Dementia   · Prolonged QTc       PLAN:  · Cefepime for 5 days  · Diuresis as is  · DVT prophylaxis      Will sign off          Alexander Lance DO  [facility]

## 2019-04-17 NOTE — PROGRESS NOTES
Hospitalist Progress Note  4/17/2019 10:01 AM    PCP: Danya Granados MD    [id number]     Date of Admission: 4/15/2019                                                                                                                     HOSPITAL COURSE    Patient demographics:  The patient  Benjy Miramontes is a 80 y.o. female     Significant past medical history:   Patient Active Problem List   Diagnosis    Primary osteoarthritis involving multiple joints    History of breast cancer    Chronic midline low back pain without sciatica    Essential hypertension    Cataract    Lumbar spondylosis    Spinal stenosis, lumbar    Gouty arthropathy    Hypercholesterolemia    CKD (chronic kidney disease) stage 3, GFR 30-59 ml/min ([facility])    Folate deficiency    Vitamin B12 deficiency    Dementia without behavioral disturbance    Esophageal foreign body    Prediabetes    CAP (community acquired pneumonia) due to Chlamydia species    CHF (congestive heart failure), NYHA class I, acute on chronic, combined ([address]    Typical atrial flutter ([facility])    Aspiration pneumonia ([address]    Sepsis ([address]    Acute respiratory failure with hypoxia ([address]    Abnormal radiograph         Presenting symptoms:  SOB    Diagnostic workup:      CONSULTS DURING ADMISSION :   IP CONSULT TO HOSPITALIST  IP CONSULT TO PULMONOLOGY  IP CONSULT TO CARDIOLOGY      Patient was diagnosed with:        Treatment while inpatient:                                                                                         ----------------------------------------------------------      SUBJECTIVE COMPLAINTS-     Diet: DIET DYSPHAGIA I PUREED; Carb Control: 4 carb choices (60 gms)/meal; Dysphagia I Pureed;  Honey Thick      OBJECTIVE:   Patient Active Problem List   Diagnosis    Primary osteoarthritis involving multiple joints    History of breast cancer    Chronic midline low back pain without sciatica    Essential hypertension    Cataract    Lumbar spondylosis    Spinal stenosis, lumbar    Gouty arthropathy    Hypercholesterolemia    CKD (chronic kidney disease) stage 3, GFR 30-59 ml/min ([facility])    Folate deficiency    Vitamin B12 deficiency    Dementia without behavioral disturbance    Esophageal foreign body    Prediabetes    CAP (community acquired pneumonia) due to Chlamydia species    CHF (congestive heart failure), NYHA class I, acute on chronic, combined (HCC)    Typical atrial flutter (HCC)    Aspiration pneumonia (HCC)    Sepsis (HCC)    Acute respiratory failure with hypoxia (HCC)    Abnormal radiograph       Allergies  Patient has no known allergies.     Medications    Scheduled Meds:   insulin lispro  0-12 Units Subcutaneous TID WC    insulin lispro  0-6 Units Subcutaneous Nightly    insulin glargine  10 Units Subcutaneous Nightly    insulin lispro  3 Units Subcutaneous TID WC    furosemide  40 mg Intravenous BID    allopurinol  100 mg Oral Daily    aspirin  81 mg Oral Every Other Day    colchicine  0.6 mg Oral Daily    escitalopram  10 mg Oral Daily    metoprolol succinate  25 mg Oral Daily    pantoprazole  40 mg Oral Daily    rivaroxaban  15 mg Oral Daily    sodium chloride flush  10 mL Intravenous 2 times per day    cefepime  1 g Intravenous Q12H     Continuous Infusions:   dextrose       PRN Meds:  potassium chloride, glucose, dextrose, glucagon (rDNA), dextrose, sodium chloride flush, perflutren lipid microspheres, perflutren lipid microspheres    Vitals   Vitals /wt   Patient Vitals for the past 8 hrs:   BP Temp Temp src Pulse Resp SpO2 Height Weight   04/17/19 0928 -- -- -- -- 20 91 % -- --   04/17/19 0838 131/84 98.3 °F (36.8 °C) Oral 79 20 97 % -- --   04/17/19 0325 129/81 97.3 °F (36.3 °C) Temporal 71 20 97 % 5' 2\" (1.575 m) 127 lb 3.3 oz (57.7 kg)        72HR INTAKE/OUTPUT:      Intake/Output Summary (Last 24 hours) at 4/17/2019 1001  Last data filed at 4/17/2019 0446  Gross per 24 hour   Intake 220 ml Output 3350 ml   Net -3130 ml       Exam:    Gen:   Alert and oriented ×3   Eyes: PERRL. No sclera icterus. No conjunctival injection. ENT: No discharge. Pharynx clear. External appearance of ears and nose normal.  Neck: Trachea midline. No obvious mass. Resp: No accessory muscle use. No crackles. No wheezes. No rhonchi. CV: Regular rate. Regular rhythm. No murmur or rub. No edema. GI: Non-tender. Non-distended. No hernia. Skin: Warm, dry, normal texture and turgor. Lymph: No cervical LAD. No supraclavicular LAD. M/S: / Ext. No cyanosis. No clubbing. No joint deformity. Neuro: CN 2-12 are intact,  no neurologic deficits noted. PT/INR: No results for input(s): PROTIME, INR in the last 72 hours. APTT: No results for input(s): APTT in the last 72 hours. CBC:   Recent Labs     04/15/19  0144 04/17/19  0612   WBC 13.1* 13.2*   HGB 12.7 12.2   HCT 40.9 38.7   MCV 86.2 85.2    245       BMP:   Recent Labs     04/15/19  0144 04/17/19  0611    148*   K 5.0 2.9*    104   CO2 20* 32   BUN 40* 34*   CREATININE 1.6* 1.0       LIVER PROFILE:   Recent Labs     04/15/19  0144   ALKPHOS 269*   AST 15   ALT 26   BILITOT 0.5     No results for input(s): AMYLASE in the last 72 hours. No results for input(s): LIPASE in the last 72 hours. UA:  Recent Labs     04/16/19  1120   WBCUA 17*   RBCUA 54*       TROPONIN: No results for input(s): CKTOTAL, TROPONINI in the last 72 hours. Lab Results   Component Value Date/Time    URRFLXCULT Yes 04/16/2019 11:20 AM       No results for input(s): TSHREFLEX in the last 72 hours.     No components found for: [id number]  POC GLUCOSE:    Recent Labs     04/16/19  1152 04/16/19  1845 04/16/19  2119 04/16/19  2137 04/17/19  0740   POCGLU 264* 134* 94 80 86     Recent Labs     04/16/19  1946   LABA1C 8.6      Lab Results   Component Value Date    LABA1C 8.6 04/16/2019         ASSESSMENT AND PLAN      Sepsis-present on admission  Likely due to pneumonia  Continue antibiotics      Acute hypoxic respiratory failure with hypoxia   Titrate oxygen for saturations greater than or equal to 90%  Clinically improving     pneumonia  Likely gram-negative  Healthcare associated pneumonia  continue patient on antibiotics and consult pulmonary  Continue on cefepime  Group health student and is negative  Echocardiogram shows ejection fraction of 35-40%  Increase Lasix to twice a day     dysphagia  Speech therapy evaluation     Gouty arthritis  Continue on allopurinol and colchicine     Atrial fibrillation  Metoprolol and Xarelto        DVT and GI prophylaxis                  Code Status: DNR-CCA        Dispo -continue care        The patient and / or the family were informed of the results of any tests, a time was given to answer questions, a plan was proposed and they agreed with plan. Santosh Jackson MD    This note was transcribed using [id number]. Please disregard any translational errors.

## 2019-04-17 NOTE — PROGRESS NOTES
Patient is not on an ACE/ARB d/t renal fxn status per Dr Shira Leon.   Electronically signed by Alaina Adamson [facility] on 4/17/2019 at 1:56 PM

## 2019-04-17 NOTE — PROGRESS NOTES
[facility]   Speech Therapy  Daily Dysphagia Treatment Note        Lola Alcantara  AGE: 80 y.o. GENDER: female  : 1929  [id number]  EPISODE DATE:  4/15/2019  Patient Active Problem List   Diagnosis    Primary osteoarthritis involving multiple joints    History of breast cancer    Chronic midline low back pain without sciatica    Essential hypertension    Cataract    Lumbar spondylosis    Spinal stenosis, lumbar    Gouty arthropathy    Hypercholesterolemia    CKD (chronic kidney disease) stage 3, GFR 30-59 ml/min ([facility])    Folate deficiency    Vitamin B12 deficiency    Dementia without behavioral disturbance    Esophageal foreign body    Prediabetes    CAP (community acquired pneumonia) due to Chlamydia species    CHF (congestive heart failure), NYHA class I, acute on chronic, combined ([address]    Typical atrial flutter (HCC)    Aspiration pneumonia ([address]    Sepsis ([address]    Acute respiratory failure with hypoxia ([address]    Abnormal radiograph     No Known Allergies  Treatment Diagnosis: Dysphagia       Chart review:  Lola Alcantara has a past medical history of Cataract, Chronic midline low back pain without sciatica, Essential hypertension, GERD (gastroesophageal reflux disease), Gouty arthropathy, History of breast cancer, Hypercholesterolemia, Incontinence, Lumbar spondylosis, Osteoporosis of multiple sites, Primary osteoarthritis involving multiple joints, Spinal stenosis, lumbar, and Type 2 diabetes mellitus without complication, without long-term current use of insulin ([address] History of Dysphagia dating back to 2018    MD History and Physical Documentation revealed  Christiana Jorge a 80 y.o. female who presents to the emergency department shortness of breath, hypertension. Patient was discharged [facility] [place] on . She was treated for community-acquired pneumonia, aspiration pneumonia, congestive heart failure atrial fibrillation.  She was discharged to the [facility].     Recent Chest Xray: 4/15/2019      Mild progressive perihilar opacities which may represent developing pulmonary   edema or progressive bronchopneumonia.          Pt is known to SLP Department; recent MBSS completed 4/1/2019 with recommendation for puree with nectar thick liquids 2/2 to Moderate Oropharyngeal Dysphagia characterized by labored mastication; reduced bolus formation and A-P oral transit; delayed swallow , decreased laryngeal elevation and decreased pharyngeal peristalsis. Subjective:   Current diet:  Puree/honey thickened  Comments regarding tolerating Current Diet:  Family reports increase of PO intake and tolerating diet with no trouble    Objective:     Pain   Patient Currently in Pain: No    Cognitive/Behavior   Behavior/Cognition: Alert, Cooperative, Confused, Pleasant mood    Presentations   Consistencies Administered:  Puree, Honey -cup, Nectar -cup    Positioning  Upright in chair     Dysphagia Tx: The focus of this session was to ensure diet tolerance and potential upgrade to nectar liquids    Goals:   Dysphagia Goals:   1. The patient will tolerate recommended diet without observed clinical signs of aspiration. --ongoing. 2. The patient/caregiver will demonstrate understanding of compensatory strategies for improved swallowing safety. --ongoing. Pt's daughter is very involved in her feeding and follows through with safe swallowing strategies well. Assessment:   Impressions:   · Accepted and tolerated swallow treatment at bedside  · Patient was alert, cooperative, and pleasant. · Patient with limited verbal expression  · Moderate oral stage dysphagia characterized by increased AP transit time with puree and suspected continued impaired mastication with solids.   · Moderate pharyngeal stage dysphagia characterized by small throat clear after nectar serial swallows x1 however appeared to tolerate nectar on other trials without overt s/s

## 2019-04-18 LAB
GLUCOSE BLD-MCNC: 117 MG/DL (ref 70–99)
GLUCOSE BLD-MCNC: 140 MG/DL (ref 70–99)
GLUCOSE BLD-MCNC: 144 MG/DL (ref 70–99)
GLUCOSE BLD-MCNC: 242 MG/DL (ref 70–99)
GLUCOSE BLD-MCNC: 43 MG/DL (ref 70–99)
GLUCOSE BLD-MCNC: 76 MG/DL (ref 70–99)
GLUCOSE BLD-MCNC: 83 MG/DL (ref 70–99)
PERFORMED ON: ABNORMAL
PERFORMED ON: NORMAL
PERFORMED ON: NORMAL

## 2019-04-18 PROCEDURE — 97530 THERAPEUTIC ACTIVITIES: CPT

## 2019-04-18 PROCEDURE — 6360000002 HC RX W HCPCS: Performed by: INTERNAL MEDICINE

## 2019-04-18 PROCEDURE — 94760 N-INVAS EAR/PLS OXIMETRY 1: CPT

## 2019-04-18 PROCEDURE — 2060000000 HC ICU INTERMEDIATE R&B

## 2019-04-18 PROCEDURE — 92526 ORAL FUNCTION THERAPY: CPT

## 2019-04-18 PROCEDURE — 2580000003 HC RX 258: Performed by: HOSPITALIST

## 2019-04-18 PROCEDURE — 6370000000 HC RX 637 (ALT 250 FOR IP): Performed by: INTERNAL MEDICINE

## 2019-04-18 PROCEDURE — 6360000002 HC RX W HCPCS: Performed by: HOSPITALIST

## 2019-04-18 PROCEDURE — 6370000000 HC RX 637 (ALT 250 FOR IP): Performed by: HOSPITALIST

## 2019-04-18 PROCEDURE — 97127 HC SP THER IVNTJ W/FOCUS COG FUNCJ: CPT

## 2019-04-18 PROCEDURE — 2580000003 HC RX 258: Performed by: INTERNAL MEDICINE

## 2019-04-18 RX ADMIN — INSULIN LISPRO 2 UNITS: 100 INJECTION, SOLUTION INTRAVENOUS; SUBCUTANEOUS at 08:37

## 2019-04-18 RX ADMIN — FUROSEMIDE 40 MG: 10 INJECTION, SOLUTION INTRAMUSCULAR; INTRAVENOUS at 18:07

## 2019-04-18 RX ADMIN — CEFEPIME HYDROCHLORIDE 1 G: 1 INJECTION, POWDER, FOR SOLUTION INTRAMUSCULAR; INTRAVENOUS at 15:38

## 2019-04-18 RX ADMIN — INSULIN LISPRO 4 UNITS: 100 INJECTION, SOLUTION INTRAVENOUS; SUBCUTANEOUS at 12:39

## 2019-04-18 RX ADMIN — Medication 10 ML: at 08:37

## 2019-04-18 RX ADMIN — INSULIN LISPRO 5 UNITS: 100 INJECTION, SOLUTION INTRAVENOUS; SUBCUTANEOUS at 18:07

## 2019-04-18 RX ADMIN — COLCHICINE 0.6 MG: 0.6 TABLET, FILM COATED ORAL at 08:35

## 2019-04-18 RX ADMIN — ESCITALOPRAM OXALATE 10 MG: 10 TABLET ORAL at 08:35

## 2019-04-18 RX ADMIN — INSULIN LISPRO 3 UNITS: 100 INJECTION, SOLUTION INTRAVENOUS; SUBCUTANEOUS at 08:38

## 2019-04-18 RX ADMIN — CEFEPIME HYDROCHLORIDE 1 G: 1 INJECTION, POWDER, FOR SOLUTION INTRAMUSCULAR; INTRAVENOUS at 04:06

## 2019-04-18 RX ADMIN — ALLOPURINOL 100 MG: 100 TABLET ORAL at 08:35

## 2019-04-18 RX ADMIN — RIVAROXABAN 15 MG: 15 TABLET, FILM COATED ORAL at 08:35

## 2019-04-18 RX ADMIN — FUROSEMIDE 40 MG: 10 INJECTION, SOLUTION INTRAMUSCULAR; INTRAVENOUS at 08:37

## 2019-04-18 RX ADMIN — PANTOPRAZOLE SODIUM 40 MG: 40 TABLET, DELAYED RELEASE ORAL at 06:24

## 2019-04-18 RX ADMIN — DEXTROSE MONOHYDRATE 12.5 G: 25 INJECTION, SOLUTION INTRAVENOUS at 21:43

## 2019-04-18 RX ADMIN — METOPROLOL SUCCINATE 25 MG: 25 TABLET, EXTENDED RELEASE ORAL at 08:41

## 2019-04-18 RX ADMIN — Medication 10 ML: at 23:53

## 2019-04-18 RX ADMIN — INSULIN LISPRO 3 UNITS: 100 INJECTION, SOLUTION INTRAVENOUS; SUBCUTANEOUS at 12:39

## 2019-04-18 ASSESSMENT — PAIN SCALES - WONG BAKER: WONGBAKER_NUMERICALRESPONSE: 0

## 2019-04-18 ASSESSMENT — PAIN SCALES - GENERAL
PAINLEVEL_OUTOF10: 0

## 2019-04-18 NOTE — PROGRESS NOTES
Physical Therapy  Facility/Department: [address] PROGRESSIVE CARE  Daily Treatment Note  NAME: Mariah Juárez  : 1929  MRN: [id number]    Date of Service: 2019    Discharge Recommendations:  Patient would benefit from continued therapy after discharge, 3-5 sessions per week   PT Equipment Recommendations  Equipment Needed: No    Patient Diagnosis(es): The primary encounter diagnosis was HCAP (healthcare-associated pneumonia). Diagnoses of Sepsis, due to unspecified organism ([facility] Lactic acidosis, Chronic kidney disease, unspecified CKD stage, Type 2 diabetes mellitus with hyperglycemia, unspecified whether long term insulin use ([facility] Congestive heart failure, unspecified HF chronicity, unspecified heart failure type [facility]), Paroxysmal atrial fibrillation ([facility] and Atrial flutter, paroxysmal ([facility] were also pertinent to this visit. has a past medical history of Cataract, Chronic midline low back pain without sciatica, Essential hypertension, GERD (gastroesophageal reflux disease), Gouty arthropathy, History of breast cancer, Hypercholesterolemia, Incontinence, Lumbar spondylosis, Osteoporosis of multiple sites, Primary osteoarthritis involving multiple joints, Spinal stenosis, lumbar, and Type 2 diabetes mellitus without complication, without long-term current use of insulin ([facility] has a past surgical history that includes Mastectomy; Cataract removal (2012); Cataract removal (2012); Cholecystectomy; Upper gastrointestinal endoscopy; Upper gastrointestinal endoscopy (N/A, 2018); Esophagus dilation (N/A, 2018); Upper gastrointestinal endoscopy (2018); Upper gastrointestinal endoscopy (N/A, 2018); and Esophagus dilation (N/A, 2018).     Restrictions  Restrictions/Precautions  Restrictions/Precautions: Fall Risk  Position Activity Restriction  Other position/activity restrictions: diet: dysphagia I pureed/ honey thick  Subjective   General  Chart Reviewed: Inpatient T-Scale Score : 30.55  Mobility Inpatient CMS 0-100% Score: 81.38  Mobility Inpatient CMS G-Code Modifier : CM        Goals  Short term goals  Time Frame for Short term goals: upon d/c--goals ongoing, with limited progress this date. Short term goal 1: Bed mobility with mod A of 1-2. Short term goal 2: Transfers sit <> stand with mod A of 1-2. Short term goal 3: Tolerate up to the chair with stedy with assist of 2---MET 2/17/19. Short term goal 4: Ambulate with the walker 10 feet with mod A of 2.    Patient Goals   Patient goals : dgt would like for the pt to be able to go home    Plan    Plan  Times per week: 3-5x/wk in acute setting   Current Treatment Recommendations: Functional Mobility Training  Safety Devices  Type of devices: Call light within reach, Gait belt, Nurse notified, Left in chair, Chair alarm in place     Therapy Time   Individual Concurrent Group Co-treatment   Time In 0808         Time Out 0840         Minutes 32         Timed Code Treatment Minutes: [address],   [facility] [place]

## 2019-04-18 NOTE — PLAN OF CARE
Problem: Falls - Risk of:  Goal: Will remain free from falls  Description  Will remain free from falls  Outcome: Ongoing     Problem: Falls - Risk of:  Goal: Absence of physical injury  Description  Absence of physical injury  Outcome: Ongoing     Problem: Risk for Impaired Skin Integrity  Goal: Tissue integrity - skin and mucous membranes  Description  Structural intactness and normal physiological function of skin and  mucous membranes.   Outcome: Ongoing     Problem: OXYGENATION/RESPIRATORY FUNCTION  Goal: Patient will maintain patent airway  Outcome: Ongoing     Problem: HEMODYNAMIC STATUS  Goal: Patient has stable vital signs and fluid balance  Outcome: Ongoing

## 2019-04-18 NOTE — PROGRESS NOTES
Hospitalist Progress Note  4/18/2019 10:36 AM    PCP: Lisa Rodriguez MD    [phone]     Date of Admission: 4/15/2019                                                                                                                     HOSPITAL COURSE    Patient demographics:  The patient  Hina Mccloud is a 80 y.o. female     Significant past medical history:   Patient Active Problem List   Diagnosis    Primary osteoarthritis involving multiple joints    History of breast cancer    Chronic midline low back pain without sciatica    Essential hypertension    Cataract    Lumbar spondylosis    Spinal stenosis, lumbar    Gouty arthropathy    Hypercholesterolemia    CKD (chronic kidney disease) stage 3, GFR 30-59 ml/min ([facility])    Folate deficiency    Vitamin B12 deficiency    Dementia without behavioral disturbance    Esophageal foreign body    Prediabetes    CAP (community acquired pneumonia) due to Chlamydia species    CHF (congestive heart failure), NYHA class I, acute on chronic, combined ([address]    Typical atrial flutter ([facility])    Aspiration pneumonia ([address]    Sepsis ([address]    Acute respiratory failure with hypoxia ([address]    Abnormal radiograph         Presenting symptoms:  SOB    Diagnostic workup:      CONSULTS DURING ADMISSION :   IP CONSULT TO HOSPITALIST  IP CONSULT TO PULMONOLOGY  IP CONSULT TO CARDIOLOGY      Patient was diagnosed with:        Treatment while inpatient:                                                                                         ----------------------------------------------------------      SUBJECTIVE COMPLAINTS-     Diet: DIET DYSPHAGIA I PUREED; Carb Control: 4 carb choices (60 gms)/meal; Dysphagia I Pureed;  Honey Thick      OBJECTIVE:   Patient Active Problem List   Diagnosis    Primary osteoarthritis involving multiple joints    History of breast cancer    Chronic midline low back pain without sciatica    Essential hypertension    Cataract    Lumbar spondylosis    Spinal stenosis, lumbar    Gouty arthropathy    Hypercholesterolemia    CKD (chronic kidney disease) stage 3, GFR 30-59 ml/min ([facility])    Folate deficiency    Vitamin B12 deficiency    Dementia without behavioral disturbance    Esophageal foreign body    Prediabetes    CAP (community acquired pneumonia) due to Chlamydia species    CHF (congestive heart failure), NYHA class I, acute on chronic, combined (HCC)    Typical atrial flutter (HCC)    Aspiration pneumonia (HCC)    Sepsis (HCC)    Acute respiratory failure with hypoxia (HCC)    Abnormal radiograph       Allergies  Patient has no known allergies. Medications    Scheduled Meds:   insulin lispro  0-12 Units Subcutaneous TID WC    insulin lispro  0-6 Units Subcutaneous Nightly    insulin lispro  3 Units Subcutaneous TID WC    furosemide  40 mg Intravenous BID    allopurinol  100 mg Oral Daily    aspirin  81 mg Oral Every Other Day    colchicine  0.6 mg Oral Daily    escitalopram  10 mg Oral Daily    metoprolol succinate  25 mg Oral Daily    pantoprazole  40 mg Oral Daily    rivaroxaban  15 mg Oral Daily    sodium chloride flush  10 mL Intravenous 2 times per day    cefepime  1 g Intravenous Q12H     Continuous Infusions:   dextrose       PRN Meds:  potassium chloride, glucose, dextrose, glucagon (rDNA), dextrose, sodium chloride flush, perflutren lipid microspheres, perflutren lipid microspheres    Vitals   Vitals /wt   Patient Vitals for the past 8 hrs:   BP Temp Temp src Pulse Resp SpO2 Weight   04/18/19 0726 107/68 96.7 °F (35.9 °C) Oral 68 16 95 % --   04/18/19 0401 108/68 97.9 °F (36.6 °C) Oral 73 16 94 % 126 lb 1.7 oz (57.2 kg)        72HR INTAKE/OUTPUT:      Intake/Output Summary (Last 24 hours) at 4/18/2019 1036  Last data filed at 4/17/2019 2136  Gross per 24 hour   Intake 1150 ml   Output 2400 ml   Net -1250 ml       Exam:    Gen:   Alert and oriented ×3   Eyes: PERRL. No sclera icterus.  No conjunctival injection. ENT: No discharge. Pharynx clear. External appearance of ears and nose normal.  Neck: Trachea midline. No obvious mass. Resp: No accessory muscle use. No crackles. No wheezes. No rhonchi. CV: Regular rate. Regular rhythm. No murmur or rub. No edema. GI: Non-tender. Non-distended. No hernia. Skin: Warm, dry, normal texture and turgor. Lymph: No cervical LAD. No supraclavicular LAD. M/S: / Ext. No cyanosis. No clubbing. No joint deformity. Neuro: CN 2-12 are intact,  no neurologic deficits noted. PT/INR: No results for input(s): PROTIME, INR in the last 72 hours. APTT: No results for input(s): APTT in the last 72 hours. CBC:   Recent Labs     04/17/19  0612   WBC 13.2*   HGB 12.2   HCT 38.7   MCV 85.2          BMP:   Recent Labs     04/17/19  0611 04/17/19  1350   *  --    K 2.9* 3.5     --    CO2 32  --    BUN 34*  --    CREATININE 1.0  --        LIVER PROFILE:   No results for input(s): ALKPHOS, AST, ALT, ALB, BILIDIR, BILITOT, ALKPHOS in the last 72 hours. No results for input(s): AMYLASE in the last 72 hours. No results for input(s): LIPASE in the last 72 hours. UA:  Recent Labs     04/16/19  1120   WBCUA 17*   RBCUA 54*       TROPONIN: No results for input(s): CKTOTAL, TROPONINI in the last 72 hours. Lab Results   Component Value Date/Time    URRFLXCULT Yes 04/16/2019 11:20 AM       No results for input(s): TSHREFLEX in the last 72 hours.     No components found for: TDP3730  POC GLUCOSE:    Recent Labs     04/17/19  1128 04/17/19  1639 04/17/19  2123 04/18/19  0211 04/18/19  0730   POCGLU 154* 224* 81 76 144*     Recent Labs     04/16/19  1946   LABA1C 8.6      Lab Results   Component Value Date    LABA1C 8.6 04/16/2019         ASSESSMENT AND PLAN      Sepsis-present on admission  Likely due to pneumonia  Continue antibiotics    Acute on chronic systolic diastolic CHF  Echocardiogram shows ejection fraction 35-40%  Continue on diuretics      Acute hypoxic respiratory failure with hypoxia   Titrate oxygen for saturations greater than or equal to 90%  Clinically improving    Chronic kidney disease stage III   continue to monitor creatinine while patient is on diuretics      pneumonia  Likely gram-negative  Healthcare associated pneumonia  continue patient on antibiotics and consult pulmonary  Continue on cefepime  Group health student and is negative         dysphagia  Speech therapy evaluation     Gouty arthritis  Continue on allopurinol and colchicine     Atrial fibrillation  Metoprolol and Xarelto        DVT and GI prophylaxis                Code Status: DNR-CCA        Dispo -continue care        The patient and / or the family were informed of the results of any tests, a time was given to answer questions, a plan was proposed and they agreed with plan. Gurney Gitelman, MD    This note was transcribed using [employer]. Please disregard any translational errors.

## 2019-04-18 NOTE — PROGRESS NOTES
[facility]   Speech Therapy  Daily Dysphagia Treatment Note    Keiry Jackson  AGE: 80 y.o. GENDER: female  : 1929  [id number]  EPISODE DATE:  4/15/2019     Patient Active Problem List   Diagnosis    Primary osteoarthritis involving multiple joints    History of breast cancer    Chronic midline low back pain without sciatica    Essential hypertension    Cataract    Lumbar spondylosis    Spinal stenosis, lumbar    Gouty arthropathy    Hypercholesterolemia    CKD (chronic kidney disease) stage 3, GFR 30-59 ml/min ([facility])    Folate deficiency    Vitamin B12 deficiency    Dementia without behavioral disturbance    Esophageal foreign body    Prediabetes    CAP (community acquired pneumonia) due to Chlamydia species    CHF (congestive heart failure), NYHA class I, acute on chronic, combined ([address]    Typical atrial flutter (HCC)    Aspiration pneumonia ([address]    Sepsis ([address]    Acute respiratory failure with hypoxia ([address]    Abnormal radiograph     No Known Allergies  Treatment Diagnosis: Dysphagia     Chart Review:  Keiry Jackson has a past medical history of Cataract, Chronic midline low back pain without sciatica, Essential hypertension, GERD (gastroesophageal reflux disease), Gouty arthropathy, History of breast cancer, Hypercholesterolemia, Incontinence, Lumbar spondylosis, Osteoporosis of multiple sites, Primary osteoarthritis involving multiple joints, Spinal stenosis, lumbar, and Type 2 diabetes mellitus without complication, without long-term current use of insulin ([address] History of Dysphagia dating back to 2018    MD History and Physical Documentation revealed  Edith Herrera a 80 y.o. female who presents to the emergency department shortness of breath, hypertension. Patient [facility] on . She was treated for community-acquired pneumonia, aspiration pneumonia, congestive heart failure atrial fibrillation.  She was [facility] to the [facility].     Recent Chest Xray: 4/15/2019      Mild progressive perihilar opacities which may represent developing pulmonary   edema or progressive bronchopneumonia.          Pt is known to SLP Department; recent MBSS completed 4/1/2019 with recommendation for puree with nectar thick liquids 2/2 to Moderate Oropharyngeal Dysphagia characterized by labored mastication; reduced bolus formation and A-P oral transit; delayed swallow , decreased laryngeal elevation and decreased pharyngeal peristalsis. Subjective:   Current diet:  Puree/honey thickened    Comments regarding tolerating Current Diet:  Family reports increase of PO intake and tolerating diet with no trouble    Objective:     Pain   Patient Currently in Pain: No    Cognitive/Behavior   Behavior/Cognition: Alert, Cooperative, Confused, Pleasant mood    Presentations   Consistencies Administered:  Puree, Honey -cup, Nectar -cup    Positioning  Upright in chair     Dysphagia Tx: The focus of this session was to ensure diet tolerance and potential upgrade to nectar liquids    Goals:   Dysphagia Goals:   1. The patient will tolerate recommended diet without observed clinical signs of aspiration. MET/MAINTAIN  2. The patient/caregiver will demonstrate understanding of compensatory strategies for improved swallowing safety. --ongoing. 3. The patient will tolerate trials of nectar thick liquids- ongoing    Assessment:   Impressions:   · Accepted and tolerated swallow treatment at bedside  · Patient was alert, cooperative, and pleasant. · Patient with limited verbal expression  · Moderate oral stage dysphagia characterized by increased AP transit time with puree and suspected continued impaired mastication with solids.   · Moderate pharyngeal stage dysphagia characterized by continued suspected delayed swallow with thin liquids; however this date patient able to tolerate nectar serial swallows via straw with no overt s/s of penetration/aspiration  · Upgrade to nectar liquids this date and continue puree foods  Patient remains impulsive and will require reinforcement for small sips    Diet Recommendations:  Pureed/Nectar  Recommended Form of Meds: Crushed in puree as able    Strategies:   Upright as possible for all oral intake, Assist feed, Swallow 2 times per bite/sip, Liquid by spoon only, No straws, Total feed, Remain upright for 30-45 minutes after meals(oral care post meals and med pass to ensure all oral pocketing cleared)    Education:  Consulted and agree with results and recommendations: Patient, Family member  Patient Education Response: Verbalizes understanding, No evidence of learning    Prognosis for swallowing:   Good for safe diet tolerance      Plan:     Continue Dysphagia Therapy: Yes    Interventions: Therapeutic Interventions: Diet tolerance monitoring, Patient/Family education  Duration/Frequency of therapy while on unit: Duration/Frequency of Treatment  Duration/Frequency of Treatment: 3-5 times whiel on acute medical floor. Anticipate will need additional therapy at SNF  Discharge Instructions:   Anticipate to be determined for further skilled Speech Therapy for Dysphagia at discharge    This note serves as a D/C Summary in the event that this patient is discharged prior to the next therapy session. Coded treatment time: 10  Total treatment time: 25 minutes    Electronically signed by JEF Rivera  Clinician on 4/18/2019 at 1:15 PM    This Speech Language Pathologist was present directed the patient's care, made skilled judgement and was responsible for assessment and treatment. Stacey L. Dennie Pale, [address] [facility], [place], operated by [facility], [id number]  Speech-Language Pathologist

## 2019-04-18 NOTE — CARE COORDINATION
[address] Transitions Interview     2019    Patient: Krista Tompkins Patient : 1929   MRN: [id number]  Reason for Admission: SOB  RARS: Readmission Risk Score: 20         Spoke with: Krista Tompkins &         Readmission Risk  Patient Active Problem List   Diagnosis    Primary osteoarthritis involving multiple joints    History of breast cancer    Chronic midline low back pain without sciatica    Essential hypertension    Cataract    Lumbar spondylosis    Spinal stenosis, lumbar    Gouty arthropathy    Hypercholesterolemia    CKD (chronic kidney disease) stage 3, GFR 30-59 ml/min ([facility])    Folate deficiency    Vitamin B12 deficiency    Dementia without behavioral disturbance    Esophageal foreign body    Prediabetes    CAP (community acquired pneumonia) due to Chlamydia species    CHF (congestive heart failure), NYHA class I, acute on chronic, combined ([facility]    Typical atrial flutter ([facility])    Aspiration pneumonia ([facility]    Sepsis ([facility]    Acute respiratory failure with hypoxia ([facility]    Abnormal radiograph       Inpatient Assessment  Care Transitions Summary    Care Transitions Inpatient Review  Medication Review  Do you have all of your prescriptions and are they filled?:  Yes   Barriers to Medication Adherence:  Other  Are you able to afford your medications?:  Yes  How often do you have difficulty taking your medications?:  I always take them as prescribed. Housing Review  Who do you live with?:  Partner/Spouse/SO, Child  Are you an active caregiver in your home?:  No  Social Support  Durable Medical Equipment  Patient DME:  Wheelchair, Shower chair, Cody Rm, Other  Other Patient DME:  rails on the toilet/grab bars in the shower/gait belt  Functional Review  Ability to seek help/take action for Emergent/Urgent situations i.e. fire, crime, inclement weather or health crisis. :  Dependent  Ability handle personal hygiene needs (bathing/dressing/grooming):  Dependent  Ability to manage medications:  Dependent  Ability to prepare food:  Dependent  Ability to maintain home (clean home, laundry):  Dependent  Ability to drive and/or has transportation:  Dependent  Ability to do shopping:  Dependent  Ability to manage finances:  Dependent  Is patient able to live independently?:  No  Hearing and Vision  Visual Impairment:  None  Hearing Impairment:  Hard of hearing  Care Transitions Interventions         Follow Up: Met with patient to discuss care transition. Role of CTC and care transition program explained to patient. nH Predict Tool presented and discussed with patient and . Tool aligns with hospital plan to discharge to skilled facility. CTC provided contact information and will remain available. Future Appointments   Date Time Provider Sandeep Fox   6/17/2019  1:30 PM Mathew Sanchez [facility] [address] [facility]   8/1/2019 11:20 AM Ajay Allan MD [place] [facility]  There are no preventive care reminders to display for this patient.     Caty Huddleston RN

## 2019-04-18 NOTE — PROGRESS NOTES
Occupational Therapy  Facility/Department: [address] PROGRESSIVE CARE  Daily Treatment Note  NAME: Pineda Cervantes  : 1929  MRN: [id number]    Date of Service: 2019    Discharge Recommendations:  Patient would benefit from continued therapy after discharge, 3-5 sessions per week     Pineda Cervantes scored a 10/24 on the AM-PAC ADL Inpatient form. Current research shows that an AM-PAC score of 17 or less is typically not associated with a discharge to the patient's home setting. Based on the patients AM-PAC score and their current ADL deficits, it is recommended that the patient have 3-5 sessions per week of Occupational Therapy at d/c to increase the patients independence. Assessment   Performance deficits / Impairments: Decreased functional mobility ; Decreased high-level IADLs;Decreased ADL status; Decreased endurance;Decreased cognition;Decreased strength;Decreased balance;Decreased safe awareness  Assessment: Discussed with OTR: PT tolerated session fairly well, making gradual progress and requiring Mod/Max A for bed mob. Pt required Max A x2 for sit><stands and dep for bed to chair transfer with jayant stedy lift. Pt feeding self with set up and washes face w/ prepared clothe. Pt will benefit from cont OT at d/c to maximize function and decrease caregiver burden. Cont POC. Prognosis: Fair  Patient Education: Role of OT-pt with limited understanding-  [place] and hx of dementia  REQUIRES OT FOLLOW UP: Yes  Activity Tolerance  Activity Tolerance: Patient Tolerated treatment well;Treatment limited secondary to decreased cognition  Safety Devices  Safety Devices in place: Yes(RNvandana)  Type of devices: Left in chair;Call light within reach;Nurse notified; Chair alarm in place;Gait belt         Patient Diagnosis(es): The primary encounter diagnosis was HCAP (healthcare-associated pneumonia).  Diagnoses of Sepsis, due to unspecified organism ([facility] Lactic acidosis, Chronic kidney disease, unspecified CKD stage, Type 2 diabetes mellitus with hyperglycemia, unspecified whether long term insulin use ([facility] Congestive heart failure, unspecified HF chronicity, unspecified heart failure type [facility]), Paroxysmal atrial fibrillation ([facility] and Atrial flutter, paroxysmal ([facility] were also pertinent to this visit. has a past medical history of Cataract, Chronic midline low back pain without sciatica, Essential hypertension, GERD (gastroesophageal reflux disease), Gouty arthropathy, History of breast cancer, Hypercholesterolemia, Incontinence, Lumbar spondylosis, Osteoporosis of multiple sites, Primary osteoarthritis involving multiple joints, Spinal stenosis, lumbar, and Type 2 diabetes mellitus without complication, without long-term current use of insulin ([facility] has a past surgical history that includes Mastectomy; Cataract removal (jan 2012); Cataract removal (Nov. 2012); Cholecystectomy; Upper gastrointestinal endoscopy; Upper gastrointestinal endoscopy (N/A, 12/6/2018); Esophagus dilation (N/A, 12/6/2018); Upper gastrointestinal endoscopy (12/13/2018); Upper gastrointestinal endoscopy (N/A, 12/13/2018); and Esophagus dilation (N/A, 12/13/2018). Restrictions  Restrictions/Precautions  Restrictions/Precautions: Fall Risk  Position Activity Restriction  Other position/activity restrictions: diet: dysphagia I pureed/ honey thick  Subjective   General  Chart Reviewed: Yes  Patient assessed for rehabilitation services?: Yes  Family / Caregiver Present: No  Diagnosis: HCAP, sepsis, hyperglycemia  Subjective  Subjective: Pt met BS, in bed, with eyes closed. Pt awakens to sound of name. Pt without complaints and agreeable to OT/PT co-tx  General Comment  Comments: Pt [place]      Orientation  Orientation  Overall Orientation Status: Impaired  Orientation Level: Oriented to person;Disoriented to time;Disoriented to place; Disoriented to situation  Objective    ADL  Feeding: (demonstrated reaching for cup and taking drink, Supervision)  Grooming: (washed face with prepared clothe)        Standing Balance  Time: less than 5 sec for stance on jayant stedy. Unable to come to upright stance at EOB  Activity: attempted standing at EOB-pt uses rails to pull self up with Max A x2, but unable tocome tofull upright stance. Brief stance on jayant stedy with Max A x2  Sit to stand: 2 Person assistance  Comment: sit><stands with Max A x2  Wheelchair Bed Transfers  Equipment Used: Bed(recliner)  Level of Asssistance: Dependent/Total  Wheelchair Transfers Comments: via jayant stedy  Bed mobility  Supine to Sit: Maximum assistance; Moderate assistance(x1; HOB raised and increased time to complete.)  Scooting: Maximal assistance(x1)            Cognition  Overall Cognitive Status: Exceptions  Arousal/Alertness: Delayed responses to stimuli  Following Commands: Inconsistently follows commands  Attention Span: Difficulty attending to directions  Problem Solving: Assistance required to generate solutions;Assistance required to implement solutions  Initiation: Requires cues for some  Sequencing: Requires cues for some  Cognition Comment: [place]; hx Alz Dementia          Plan   Plan  Times per week: 3-5x  Current Treatment Recommendations: Functional Mobility Training, Equipment Evaluation, Education, & procurement, Patient/Caregiver Education & Training, Self-Care / ADL, Balance Training, Strengthening    AM-PAC Score        AM-PAC Inpatient Daily Activity Raw Score: 10  AM-PAC Inpatient ADL T-Scale Score : 27.31  ADL Inpatient CMS 0-100% Score: 74.7  ADL Inpatient CMS G-Code Modifier : CL    Goals  Short term goals  Time Frame for Short term goals: Status: goals ongoing, except where noted below  Short term goal 1: Mod A for feeding and grooming- goal met for feeding  Short term goal 2: Max A for bathing and dressing  Short term goal 3: Max A for bed mobility- goal met, advance to Mod A  Short term goal 4:  Max A for pivot transfers  Patient Goals   Patient goals : pt unable to make goal       Therapy Time   Individual Concurrent Group Co-treatment   Time In 0808         Time Out 0838         Minutes 30              Tyrese TURCIOS/L,[id number]  This note to serve as discharge summary if pt d/c'd prior to next session

## 2019-04-19 LAB
ANION GAP SERPL CALCULATED.3IONS-SCNC: 12 MMOL/L (ref 3–16)
BUN BLDV-MCNC: 27 MG/DL (ref 7–20)
CALCIUM SERPL-MCNC: 8.2 MG/DL (ref 8.3–10.6)
CHLORIDE BLD-SCNC: 95 MMOL/L (ref 99–110)
CO2: 36 MMOL/L (ref 21–32)
CREAT SERPL-MCNC: 1 MG/DL (ref 0.6–1.2)
GFR AFRICAN AMERICAN: >60
GFR NON-AFRICAN AMERICAN: 52
GLUCOSE BLD-MCNC: 105 MG/DL (ref 70–99)
GLUCOSE BLD-MCNC: 138 MG/DL (ref 70–99)
GLUCOSE BLD-MCNC: 178 MG/DL (ref 70–99)
GLUCOSE BLD-MCNC: 82 MG/DL (ref 70–99)
GLUCOSE BLD-MCNC: 91 MG/DL (ref 70–99)
GLUCOSE BLD-MCNC: 92 MG/DL (ref 70–99)
GLUCOSE BLD-MCNC: 98 MG/DL (ref 70–99)
HCT VFR BLD CALC: 40.9 % (ref 36–48)
HEMOGLOBIN: 13.1 G/DL (ref 12–16)
MCH RBC QN AUTO: 27.1 PG (ref 26–34)
MCHC RBC AUTO-ENTMCNC: 32.1 G/DL (ref 31–36)
MCV RBC AUTO: 84.3 FL (ref 80–100)
PDW BLD-RTO: 19.9 % (ref 12.4–15.4)
PERFORMED ON: ABNORMAL
PERFORMED ON: NORMAL
PLATELET # BLD: 223 K/UL (ref 135–450)
PMV BLD AUTO: 9.6 FL (ref 5–10.5)
POTASSIUM SERPL-SCNC: 2.9 MMOL/L (ref 3.5–5.1)
POTASSIUM SERPL-SCNC: 3.8 MMOL/L (ref 3.5–5.1)
RBC # BLD: 4.85 M/UL (ref 4–5.2)
SODIUM BLD-SCNC: 143 MMOL/L (ref 136–145)
WBC # BLD: 12.1 K/UL (ref 4–11)

## 2019-04-19 PROCEDURE — 6370000000 HC RX 637 (ALT 250 FOR IP): Performed by: HOSPITALIST

## 2019-04-19 PROCEDURE — 97530 THERAPEUTIC ACTIVITIES: CPT

## 2019-04-19 PROCEDURE — 6360000002 HC RX W HCPCS: Performed by: INTERNAL MEDICINE

## 2019-04-19 PROCEDURE — 6360000002 HC RX W HCPCS: Performed by: HOSPITALIST

## 2019-04-19 PROCEDURE — 92526 ORAL FUNCTION THERAPY: CPT

## 2019-04-19 PROCEDURE — 36415 COLL VENOUS BLD VENIPUNCTURE: CPT

## 2019-04-19 PROCEDURE — 94760 N-INVAS EAR/PLS OXIMETRY 1: CPT

## 2019-04-19 PROCEDURE — 80048 BASIC METABOLIC PNL TOTAL CA: CPT

## 2019-04-19 PROCEDURE — 6370000000 HC RX 637 (ALT 250 FOR IP): Performed by: INTERNAL MEDICINE

## 2019-04-19 PROCEDURE — 85027 COMPLETE CBC AUTOMATED: CPT

## 2019-04-19 PROCEDURE — 2580000003 HC RX 258: Performed by: INTERNAL MEDICINE

## 2019-04-19 PROCEDURE — 2060000000 HC ICU INTERMEDIATE R&B

## 2019-04-19 PROCEDURE — 84132 ASSAY OF SERUM POTASSIUM: CPT

## 2019-04-19 RX ORDER — POTASSIUM CHLORIDE 1.5 G/1.77G
40 POWDER, FOR SOLUTION ORAL ONCE
Status: DISCONTINUED | OUTPATIENT
Start: 2019-04-19 | End: 2019-04-19

## 2019-04-19 RX ORDER — POTASSIUM CHLORIDE 7.45 MG/ML
10 INJECTION INTRAVENOUS
Status: DISCONTINUED | OUTPATIENT
Start: 2019-04-19 | End: 2019-04-19

## 2019-04-19 RX ADMIN — METOPROLOL SUCCINATE 25 MG: 25 TABLET, EXTENDED RELEASE ORAL at 07:57

## 2019-04-19 RX ADMIN — CEFEPIME HYDROCHLORIDE 1 G: 1 INJECTION, POWDER, FOR SOLUTION INTRAMUSCULAR; INTRAVENOUS at 02:33

## 2019-04-19 RX ADMIN — COLCHICINE 0.6 MG: 0.6 TABLET, FILM COATED ORAL at 07:57

## 2019-04-19 RX ADMIN — INSULIN LISPRO 2 UNITS: 100 INJECTION, SOLUTION INTRAVENOUS; SUBCUTANEOUS at 13:04

## 2019-04-19 RX ADMIN — POTASSIUM CHLORIDE 10 MEQ: 7.46 INJECTION, SOLUTION INTRAVENOUS at 07:58

## 2019-04-19 RX ADMIN — POTASSIUM CHLORIDE 10 MEQ: 7.46 INJECTION, SOLUTION INTRAVENOUS at 11:53

## 2019-04-19 RX ADMIN — ESCITALOPRAM OXALATE 10 MG: 10 TABLET ORAL at 07:57

## 2019-04-19 RX ADMIN — PANTOPRAZOLE SODIUM 40 MG: 40 TABLET, DELAYED RELEASE ORAL at 06:30

## 2019-04-19 RX ADMIN — RIVAROXABAN 15 MG: 15 TABLET, FILM COATED ORAL at 07:58

## 2019-04-19 RX ADMIN — POTASSIUM CHLORIDE 10 MEQ: 7.46 INJECTION, SOLUTION INTRAVENOUS at 09:53

## 2019-04-19 RX ADMIN — Medication 10 ML: at 18:10

## 2019-04-19 RX ADMIN — INSULIN LISPRO 5 UNITS: 100 INJECTION, SOLUTION INTRAVENOUS; SUBCUTANEOUS at 17:39

## 2019-04-19 RX ADMIN — FUROSEMIDE 40 MG: 10 INJECTION, SOLUTION INTRAMUSCULAR; INTRAVENOUS at 07:58

## 2019-04-19 RX ADMIN — ALLOPURINOL 100 MG: 100 TABLET ORAL at 07:57

## 2019-04-19 RX ADMIN — INSULIN LISPRO 5 UNITS: 100 INJECTION, SOLUTION INTRAVENOUS; SUBCUTANEOUS at 13:04

## 2019-04-19 RX ADMIN — FUROSEMIDE 40 MG: 10 INJECTION, SOLUTION INTRAMUSCULAR; INTRAVENOUS at 17:39

## 2019-04-19 RX ADMIN — CEFEPIME HYDROCHLORIDE 1 G: 1 INJECTION, POWDER, FOR SOLUTION INTRAMUSCULAR; INTRAVENOUS at 14:54

## 2019-04-19 RX ADMIN — POTASSIUM CHLORIDE 10 MEQ: 7.46 INJECTION, SOLUTION INTRAVENOUS at 10:56

## 2019-04-19 RX ADMIN — POTASSIUM CHLORIDE 10 MEQ: 7.46 INJECTION, SOLUTION INTRAVENOUS at 13:05

## 2019-04-19 RX ADMIN — ASPIRIN 81 MG 81 MG: 81 TABLET ORAL at 07:57

## 2019-04-19 RX ADMIN — POTASSIUM CHLORIDE 10 MEQ: 7.46 INJECTION, SOLUTION INTRAVENOUS at 06:39

## 2019-04-19 ASSESSMENT — PAIN SCALES - GENERAL
PAINLEVEL_OUTOF10: 0
PAINLEVEL_OUTOF10: 0

## 2019-04-19 NOTE — PROGRESS NOTES
Physical Therapy  Facility/Department: [address] PROGRESSIVE CARE  Daily Treatment Note  NAME: Annmarie Henriquez  : 1929  MRN: [id number]    Date of Service: 2019    Discharge Recommendations:  Patient would benefit from continued therapy after discharge, 3-5 sessions per week       Annmarie Henriquez scored a / on the AM-PAC short mobility form. Current research shows that an AM-PAC score of 17 or less is typically not associated with a discharge to the patient's home setting. Based on the patients AM-PAC score and their current functional mobility deficits, it is recommended that the patient have 3-5 sessions per week of Physical Therapy at d/c to increase the patients independence. DME - defer to next loc    Assessment: Pt currently needs maximove for bed to chair, unable to stand today with assist x 2. Mod to max for bed mobility. Recommend continued therapies at MD      Patient Diagnosis(es): The primary encounter diagnosis was HCAP (healthcare-associated pneumonia). Diagnoses of Sepsis, due to unspecified organism ([address] Lactic acidosis, Chronic kidney disease, unspecified CKD stage, Type 2 diabetes mellitus with hyperglycemia, unspecified whether long term insulin use ([address] Congestive heart failure, unspecified HF chronicity, unspecified heart failure type [facility]), Paroxysmal atrial fibrillation ([address] and Atrial flutter, paroxysmal ([address] were also pertinent to this visit. has a past medical history of Cataract, Chronic midline low back pain without sciatica, Essential hypertension, GERD (gastroesophageal reflux disease), Gouty arthropathy, History of breast cancer, Hypercholesterolemia, Incontinence, Lumbar spondylosis, Osteoporosis of multiple sites, Primary osteoarthritis involving multiple joints, Spinal stenosis, lumbar, and Type 2 diabetes mellitus without complication, without long-term current use of insulin ([address] has a past surgical history that includes Mastectomy;  Cataract

## 2019-04-19 NOTE — PROGRESS NOTES
Hospitalist Progress Note      PCP: Ap Contreras MD    Date of Admission: 4/15/2019        Subjective: feels better, no chest pain, SOB improved, no fever or chills,  at bedside. Medications:  Reviewed    Infusion Medications    dextrose       Scheduled Medications    potassium chloride  10 mEq Intravenous Q1H    potassium chloride  40 mEq Oral Once    insulin lispro  5 Units Subcutaneous TID WC    insulin lispro  0-12 Units Subcutaneous TID WC    insulin lispro  0-6 Units Subcutaneous Nightly    furosemide  40 mg Intravenous BID    allopurinol  100 mg Oral Daily    aspirin  81 mg Oral Every Other Day    colchicine  0.6 mg Oral Daily    escitalopram  10 mg Oral Daily    metoprolol succinate  25 mg Oral Daily    pantoprazole  40 mg Oral Daily    rivaroxaban  15 mg Oral Daily    sodium chloride flush  10 mL Intravenous 2 times per day    cefepime  1 g Intravenous Q12H     PRN Meds: potassium chloride, glucose, dextrose, glucagon (rDNA), dextrose, sodium chloride flush, perflutren lipid microspheres, perflutren lipid microspheres      Intake/Output Summary (Last 24 hours) at 4/19/2019 1353  Last data filed at 4/19/2019 1325  Gross per 24 hour   Intake 1130 ml   Output 1700 ml   Net -570 ml       Physical Exam Performed:    /72   Pulse 80   Temp 98.2 °F (36.8 °C) (Oral)   Resp 18   Ht 5' 2\" (1.575 m)   Wt 124 lb 12.5 oz (56.6 kg)   SpO2 94%   BMI 22.82 kg/m²     General appearance: No apparent distress  Neck: Supple  Respiratory:  Normal respiratory effort. Clear to auscultation, bilaterally without Rales/Wheezes/Rhonchi. Cardiovascular: Regular rate and rhythm with normal S1/S2 without murmurs, rubs or gallops. Abdomen: Soft, non-tender  Musculoskeletal: No clubbing. Bilateral leg edema   Skin: Skin color, texture, turgor normal.  No rashes or lesions.   Neurologic:  No focal weakness   Psychiatric: Alert   Capillary Refill: Brisk,< 3 seconds   Peripheral Pulses: +2 palpable, equal bilaterally       Labs:   Recent Labs     04/17/19  0612 04/19/19  0519   WBC 13.2* 12.1*   HGB 12.2 13.1   HCT 38.7 40.9    223     Recent Labs     04/17/19  0611 04/17/19  1350 04/19/19  0519   *  --  143   K 2.9* 3.5 2.9*     --  95*   CO2 32  --  36*   BUN 34*  --  27*   CREATININE 1.0  --  1.0   CALCIUM 8.5  --  8.2*     No results for input(s): AST, ALT, BILIDIR, BILITOT, ALKPHOS in the last 72 hours. No results for input(s): INR in the last 72 hours. No results for input(s): [place] in the last 72 hours. Urinalysis:      Lab Results   Component Value Date    NITRU Negative 04/16/2019    WBCUA 17 04/16/2019    BACTERIA 4+ 03/29/2019    RBCUA 54 04/16/2019    BLOODU MODERATE 04/16/2019    SPECGRAV 1.008 04/16/2019    GLUCOSEU Negative 04/16/2019       Radiology:  XR CHEST PORTABLE   Final Result   Features of heart failure, including bilateral pleural effusions and moderate   to severe pulmonary edema. Superimposed infection could be present in the   appropriate clinical context. XR CHEST PORTABLE   Final Result   Mild progressive perihilar opacities which may represent developing pulmonary   edema or progressive bronchopneumonia. XR CHEST STANDARD (2 VW)    (Results Pending)           Assessment/Plan:    Active Hospital Problems    Diagnosis Date Noted    Sepsis ([facility] [A41.9] 04/15/2019    Acute respiratory failure with hypoxia (HCC) [J96.01]     Abnormal radiograph [R93.89]      1. Sepsis-present on admission, Likely due to pneumonia, continue antibiotics for now, pulmonary were consulted during this admission  2. Acute on chronic systolic diastolic CHF, Echocardiogram shows ejection fraction 35-40%, Continue on diuretics IV, replace potassium, repeat chest xray in am to follow on progress. Negative for 5000 cc so far.    3. Acute hypoxic respiratory failure with hypoxia, Titrate oxygen for saturations greater than or equal to 90%, due to pneumonia and heart failure, clinically improving. 4. Hypokalemia, will replace with iv and po supplements.    5. Chronic kidney disease stage III,  continue to monitor creatinine while patient is on diuretics. 6. Pneumonia, possibly gram-negative, healthcare associated pneumonia  continue patient on cefepime for now. 7. Dysphagia, Speech therapy evaluation  8. Atrial fibrillation, Metoprolol and Xarelto        Diet: DIET DYSPHAGIA I PUREED; Dysphagia I Pureed;  Nectar Thick  Code Status: DNR-CCA            Arash Wood MD

## 2019-04-19 NOTE — PLAN OF CARE
Problem: Falls - Risk of:  Goal: Will remain free from falls  Description  Will remain free from falls  4/19/2019 1049 by Cee Meier RN  Outcome: Ongoing    Problem: Falls - Risk of:  Goal: Absence of physical injury  Description  Absence of physical injury  4/19/2019 1049 by Cee Meier RN  Outcome: Ongoing  Socks applied, call light in reach. Bed in lowest position. Problem: Risk for Impaired Skin Integrity  Goal: Tissue integrity - skin and mucous membranes  Description  Structural intactness and normal physiological function of skin and  mucous membranes. 4/19/2019 1049 by Cee Meier RN  Outcome: Ongoing  Skin assessed at this time. No evidence of new breakdown at this time. Turn Q2 hour if necessary.

## 2019-04-19 NOTE — PLAN OF CARE
Problem: Falls - Risk of:  Goal: Will remain free from falls  Description  Will remain free from falls  4/19/2019 0319 by Nikolai Jonas  Outcome: Ongoing  4/18/2019 1841 by Teresa Sarmiento RN  Outcome: Ongoing  Goal: Absence of physical injury  Description  Absence of physical injury  4/19/2019 0319 by Nikolai Jonas  Outcome: Ongoing  4/18/2019 1841 by Teresa Sarmiento RN  Outcome: Ongoing     Problem: Risk for Impaired Skin Integrity  Goal: Tissue integrity - skin and mucous membranes  Description  Structural intactness and normal physiological function of skin and  mucous membranes.   4/19/2019 0319 by Nikolai Jonas  Outcome: Ongoing  4/18/2019 1841 by Teresa Sarmiento RN  Outcome: Ongoing     Problem: OXYGENATION/RESPIRATORY FUNCTION  Goal: Patient will maintain patent airway  4/19/2019 0319 by Nikolai Jonas  Outcome: Ongoing  4/18/2019 1841 by Teresa Sarmiento RN  Outcome: Ongoing  Goal: Patient will achieve/maintain normal respiratory rate/effort  Description  Respiratory rate and effort will be within normal limits for the patient  4/19/2019 0319 by Nikolai Jonas  Outcome: Ongoing  4/18/2019 1841 by Teresa Sarmiento RN  Outcome: Ongoing     Problem: HEMODYNAMIC STATUS  Goal: Patient has stable vital signs and fluid balance  4/19/2019 0319 by Nikolai Jonas  Outcome: Ongoing  4/18/2019 1841 by Teresa Sarmiento RN  Outcome: Ongoing     Problem: FLUID AND ELECTROLYTE IMBALANCE  Goal: Fluid and electrolyte balance are achieved/maintained  4/19/2019 0319 by Nikolai Jonas  Outcome: Ongoing  4/18/2019 1841 by Teresa Sarmiento RN  Outcome: Ongoing     Problem: ACTIVITY INTOLERANCE/IMPAIRED MOBILITY  Goal: Mobility/activity is maintained at optimum level for patient  4/19/2019 0319 by Nikolai Jonas  Outcome: Ongoing  4/18/2019 1841 by Teresa Sarmiento RN  Outcome: Ongoing     Problem: Pain:  Goal: Pain level will decrease  Description  Pain level will decrease  4/19/2019 0319 by Nikolai Jonas  Outcome: Ongoing  4/18/2019 1841

## 2019-04-19 NOTE — PROGRESS NOTES
[facility]   Speech Therapy  Daily Dysphagia Treatment Note    Veronica Hester  AGE: 80 y.o. GENDER: female  : 1929  [id number]  EPISODE DATE:  4/15/2019     Patient Active Problem List   Diagnosis    Primary osteoarthritis involving multiple joints    History of breast cancer    Chronic midline low back pain without sciatica    Essential hypertension    Cataract    Lumbar spondylosis    Spinal stenosis, lumbar    Gouty arthropathy    Hypercholesterolemia    CKD (chronic kidney disease) stage 3, GFR 30-59 ml/min ([facility])    Folate deficiency    Vitamin B12 deficiency    Dementia without behavioral disturbance    Esophageal foreign body    Prediabetes    CAP (community acquired pneumonia) due to Chlamydia species    CHF (congestive heart failure), NYHA class I, acute on chronic, combined ([address]    Typical atrial flutter (HCC)    Aspiration pneumonia ([address]    Sepsis ([address]    Acute respiratory failure with hypoxia ([address]    Abnormal radiograph     No Known Allergies  Treatment Diagnosis: Dysphagia     Chart Review:  Veronica Hester has a past medical history of Cataract, Chronic midline low back pain without sciatica, Essential hypertension, GERD (gastroesophageal reflux disease), Gouty arthropathy, History of breast cancer, Hypercholesterolemia, Incontinence, Lumbar spondylosis, Osteoporosis of multiple sites, Primary osteoarthritis involving multiple joints, Spinal stenosis, lumbar, and Type 2 diabetes mellitus without complication, without long-term current use of insulin ([address] History of Dysphagia dating back to 2018    MD History and Physical Documentation revealed  Donald Cintron a 80 y.o. female who presents to the emergency department shortness of breath, hypertension. Patient was discharged [facility] [address] [facility] . She was treated for community-acquired pneumonia, aspiration pneumonia, congestive heart failure atrial fibrillation.  She was discharged to the [facility].     Recent Chest Xray: 4/15/2019      Mild progressive perihilar opacities which may represent developing pulmonary   edema or progressive bronchopneumonia.          Pt is known to SLP Department; recent MBSS completed 4/1/2019 with recommendation for puree with nectar thick liquids 2/2 to Moderate Oropharyngeal Dysphagia characterized by labored mastication; reduced bolus formation and A-P oral transit; delayed swallow , decreased laryngeal elevation and decreased pharyngeal peristalsis. Subjective:   Current diet:  Puree/nectar thickened    Comments regarding tolerating Current Diet:  Family reports increase of PO intake and tolerating diet with no trouble    Objective:     Pain   Patient Currently in Pain: Denies    Cognitive/Behavior   Behavior/Cognition: Alert, Cooperative, Confused, Pleasant mood    Presentations   Consistencies Administered:  Puree, Nectar -cup    Positioning  Upright in chair     Dysphagia Tx: The focus of this session was to ensure diet tolerance and potential upgrade to nectar liquids    Goals:   Dysphagia Goals:   1. The patient will tolerate recommended diet without observed clinical signs of aspiration. MET/MAINTAIN  2. The patient/caregiver will demonstrate understanding of compensatory strategies for improved swallowing safety. --ongoing. 3. The patient will tolerate trials of nectar thick liquids- ongoing    Assessment:   Impressions:   · Accepted and tolerated swallow treatment at bedside  · Patient was alert, cooperative, and pleasant. · Patient with limited verbal expression  · Moderate oral stage dysphagia characterized by increased AP transit time with puree and suspected continued impaired mastication with solids.   · Moderate pharyngeal stage dysphagia characterized by continued suspected delayed swallow with thin liquids; however this date patient able to tolerate nectar serial swallows via straw with no overt s/s of penetration/aspiration  · Continue nectar liquids this date and continue puree foods  Patient remains impulsive and will require reinforcement for small sips    Diet Recommendations:  Pureed/Nectar  Recommended Form of Meds: Crushed in puree as able    Strategies:   Upright as possible for all oral intake, Assist feed, Swallow 2 times per bite/sip, Liquid by spoon only, No straws, Total feed, Remain upright for 30-45 minutes after meals(oral care post meals and med pass to ensure all oral pocketing cleared)    Education:  Consulted and agree with results and recommendations: Patient, Family member  Patient Education Response: Verbalizes understanding, No evidence of learning    Prognosis for swallowing:   Good for safe diet tolerance      Plan:     Continue Dysphagia Therapy: Yes    Interventions: Therapeutic Interventions: Diet tolerance monitoring, Patient/Family education  Duration/Frequency of therapy while on unit: Duration/Frequency of Treatment  Duration/Frequency of Treatment: 3-5 times whiel on acute medical floor. Anticipate will need additional therapy at SNF  Discharge Instructions:   Anticipate to be determined for further skilled Speech Therapy for Dysphagia at discharge    This note serves as a D/C Summary in the event that this patient is discharged prior to the next therapy session. Coded treatment time: 0  Total treatment time: 15 minutes    Mar[address], [id number]  Speech-Language Pathologist  04/19/19 10:55 AM

## 2019-04-20 ENCOUNTER — APPOINTMENT (OUTPATIENT)
Dept: GENERAL RADIOLOGY | Age: 84
DRG: 871 | End: 2019-04-20
Payer: MEDICARE

## 2019-04-20 LAB
A/G RATIO: 0.5 (ref 1.1–2.2)
ALBUMIN SERPL-MCNC: 2.3 G/DL (ref 3.4–5)
ALP BLD-CCNC: 189 U/L (ref 40–129)
ALT SERPL-CCNC: 15 U/L (ref 10–40)
ANION GAP SERPL CALCULATED.3IONS-SCNC: 13 MMOL/L (ref 3–16)
AST SERPL-CCNC: 25 U/L (ref 15–37)
BASOPHILS ABSOLUTE: 0.1 K/UL (ref 0–0.2)
BASOPHILS RELATIVE PERCENT: 0.6 %
BILIRUB SERPL-MCNC: 1 MG/DL (ref 0–1)
BLOOD CULTURE, ROUTINE: NORMAL
BUN BLDV-MCNC: 27 MG/DL (ref 7–20)
CALCIUM SERPL-MCNC: 8.3 MG/DL (ref 8.3–10.6)
CHLORIDE BLD-SCNC: 94 MMOL/L (ref 99–110)
CO2: 34 MMOL/L (ref 21–32)
CREAT SERPL-MCNC: 1.1 MG/DL (ref 0.6–1.2)
CULTURE, BLOOD 2: NORMAL
EOSINOPHILS ABSOLUTE: 0.2 K/UL (ref 0–0.6)
EOSINOPHILS RELATIVE PERCENT: 1.6 %
GFR AFRICAN AMERICAN: 57
GFR NON-AFRICAN AMERICAN: 47
GLOBULIN: 4.3 G/DL
GLUCOSE BLD-MCNC: 130 MG/DL (ref 70–99)
GLUCOSE BLD-MCNC: 146 MG/DL (ref 70–99)
GLUCOSE BLD-MCNC: 149 MG/DL (ref 70–99)
GLUCOSE BLD-MCNC: 249 MG/DL (ref 70–99)
GLUCOSE BLD-MCNC: 75 MG/DL (ref 70–99)
GLUCOSE BLD-MCNC: 92 MG/DL (ref 70–99)
HCT VFR BLD CALC: 42.6 % (ref 36–48)
HEMOGLOBIN: 13.7 G/DL (ref 12–16)
LYMPHOCYTES ABSOLUTE: 1.8 K/UL (ref 1–5.1)
LYMPHOCYTES RELATIVE PERCENT: 13.9 %
MCH RBC QN AUTO: 26.9 PG (ref 26–34)
MCHC RBC AUTO-ENTMCNC: 32.1 G/DL (ref 31–36)
MCV RBC AUTO: 83.8 FL (ref 80–100)
MONOCYTES ABSOLUTE: 1 K/UL (ref 0–1.3)
MONOCYTES RELATIVE PERCENT: 8.1 %
NEUTROPHILS ABSOLUTE: 9.6 K/UL (ref 1.7–7.7)
NEUTROPHILS RELATIVE PERCENT: 75.8 %
PDW BLD-RTO: 19.3 % (ref 12.4–15.4)
PERFORMED ON: ABNORMAL
PERFORMED ON: NORMAL
PERFORMED ON: NORMAL
PLATELET # BLD: 239 K/UL (ref 135–450)
PMV BLD AUTO: 9.4 FL (ref 5–10.5)
POTASSIUM SERPL-SCNC: 3 MMOL/L (ref 3.5–5.1)
RBC # BLD: 5.08 M/UL (ref 4–5.2)
SODIUM BLD-SCNC: 141 MMOL/L (ref 136–145)
TOTAL PROTEIN: 6.6 G/DL (ref 6.4–8.2)
WBC # BLD: 12.7 K/UL (ref 4–11)

## 2019-04-20 PROCEDURE — 94760 N-INVAS EAR/PLS OXIMETRY 1: CPT

## 2019-04-20 PROCEDURE — 36415 COLL VENOUS BLD VENIPUNCTURE: CPT

## 2019-04-20 PROCEDURE — 85025 COMPLETE CBC W/AUTO DIFF WBC: CPT

## 2019-04-20 PROCEDURE — 2060000000 HC ICU INTERMEDIATE R&B

## 2019-04-20 PROCEDURE — 6360000002 HC RX W HCPCS: Performed by: INTERNAL MEDICINE

## 2019-04-20 PROCEDURE — 6360000002 HC RX W HCPCS: Performed by: HOSPITALIST

## 2019-04-20 PROCEDURE — 80053 COMPREHEN METABOLIC PANEL: CPT

## 2019-04-20 PROCEDURE — 6370000000 HC RX 637 (ALT 250 FOR IP): Performed by: HOSPITALIST

## 2019-04-20 PROCEDURE — 71046 X-RAY EXAM CHEST 2 VIEWS: CPT

## 2019-04-20 PROCEDURE — 2580000003 HC RX 258: Performed by: INTERNAL MEDICINE

## 2019-04-20 PROCEDURE — 6370000000 HC RX 637 (ALT 250 FOR IP): Performed by: INTERNAL MEDICINE

## 2019-04-20 RX ADMIN — METOPROLOL SUCCINATE 25 MG: 25 TABLET, EXTENDED RELEASE ORAL at 08:51

## 2019-04-20 RX ADMIN — RIVAROXABAN 15 MG: 15 TABLET, FILM COATED ORAL at 08:51

## 2019-04-20 RX ADMIN — FUROSEMIDE 40 MG: 10 INJECTION, SOLUTION INTRAMUSCULAR; INTRAVENOUS at 08:50

## 2019-04-20 RX ADMIN — Medication 10 ML: at 08:51

## 2019-04-20 RX ADMIN — INSULIN LISPRO 4 UNITS: 100 INJECTION, SOLUTION INTRAVENOUS; SUBCUTANEOUS at 13:43

## 2019-04-20 RX ADMIN — PANTOPRAZOLE SODIUM 40 MG: 40 TABLET, DELAYED RELEASE ORAL at 06:49

## 2019-04-20 RX ADMIN — ESCITALOPRAM OXALATE 10 MG: 10 TABLET ORAL at 08:50

## 2019-04-20 RX ADMIN — FUROSEMIDE 40 MG: 10 INJECTION, SOLUTION INTRAMUSCULAR; INTRAVENOUS at 18:25

## 2019-04-20 RX ADMIN — CEFEPIME HYDROCHLORIDE 1 G: 1 INJECTION, POWDER, FOR SOLUTION INTRAMUSCULAR; INTRAVENOUS at 18:24

## 2019-04-20 RX ADMIN — COLCHICINE 0.6 MG: 0.6 TABLET, FILM COATED ORAL at 08:50

## 2019-04-20 RX ADMIN — INSULIN LISPRO 5 UNITS: 100 INJECTION, SOLUTION INTRAVENOUS; SUBCUTANEOUS at 13:43

## 2019-04-20 RX ADMIN — CEFEPIME HYDROCHLORIDE 1 G: 1 INJECTION, POWDER, FOR SOLUTION INTRAMUSCULAR; INTRAVENOUS at 03:45

## 2019-04-20 RX ADMIN — Medication 10 ML: at 19:45

## 2019-04-20 RX ADMIN — ALLOPURINOL 100 MG: 100 TABLET ORAL at 08:51

## 2019-04-20 RX ADMIN — INSULIN LISPRO 5 UNITS: 100 INJECTION, SOLUTION INTRAVENOUS; SUBCUTANEOUS at 08:51

## 2019-04-20 ASSESSMENT — PAIN SCALES - GENERAL
PAINLEVEL_OUTOF10: 0

## 2019-04-20 NOTE — PROGRESS NOTES
12.1* 12.7*   HGB 13.1 13.7   HCT 40.9 42.6    239     Recent Labs     04/19/19  0519 04/19/19  1547 04/20/19  0459     --  141   K 2.9* 3.8 3.0*   CL 95*  --  94*   CO2 36*  --  34*   BUN 27*  --  27*   CREATININE 1.0  --  1.1   CALCIUM 8.2*  --  8.3     Recent Labs     04/20/19  0459   AST 25   ALT 15   BILITOT 1.0   ALKPHOS 189*     No results for input(s): INR in the last 72 hours. No results for input(s): [place] in the last 72 hours. Urinalysis:      Lab Results   Component Value Date    NITRU Negative 04/16/2019    WBCUA 17 04/16/2019    BACTERIA 4+ 03/29/2019    RBCUA 54 04/16/2019    BLOODU MODERATE 04/16/2019    SPECGRAV 1.008 04/16/2019    GLUCOSEU Negative 04/16/2019       Radiology:  XR CHEST STANDARD (2 VW)   Final Result   Mild pulmonary vascular congestion remains but is significantly improved from   the prior study. No sizable pleural effusion appreciated. XR CHEST PORTABLE   Final Result   Features of heart failure, including bilateral pleural effusions and moderate   to severe pulmonary edema. Superimposed infection could be present in the   appropriate clinical context. XR CHEST PORTABLE   Final Result   Mild progressive perihilar opacities which may represent developing pulmonary   edema or progressive bronchopneumonia. Assessment/Plan:    Active Hospital Problems    Diagnosis Date Noted    Sepsis [facility]) [A41.9] 04/15/2019    Acute respiratory failure with hypoxia ([facility]) [J96.01]     Abnormal radiograph [R93.89]        Sepsis-present on admission,   Possible due to pneumonia,   FIlm array wit coronavirus. Acute on chronic systolic diastolic CHF, Echocardiogram shows ejection fraction 35-40%,   Cont with Lasix 40IV BID. 6L negative this admit.    Daily renal panel       Acute hypoxic respiratory failure with hypoxia, Titrate oxygen for saturations greater than or equal to 90%, due to pneumonia and heart failure, clinically improving. Hypokalemia, will replace with iv and po supplements.        Chronic kidney disease stage III,  continue to monitor creatinine while patient is on diuretics. Pneumonia, possibly gram-negative, healthcare associated pneumonia  continue patient on cefepime for now. MRSA negative. Film array with coronavirus. Dysphagia, Speech therapy evaluation      Atrial fibrillation, Metoprolol and Xarelto        Diet: DIET DYSPHAGIA I PUREED; Dysphagia I Pureed;  Nectar Thick  Code Status: DNR-CCA            Antoinette Craft MD

## 2019-04-20 NOTE — PLAN OF CARE
1996 by Lana Winter RN  Outcome: Ongoing  4/20/2019 0226 by Lana Winter RN  Outcome: Ongoing  Goal: Control of acute pain  Description  Control of acute pain  4/20/2019 0255 by Lana Winter RN  Outcome: Ongoing  4/20/2019 0226 by Lana Winter RN  Outcome: Ongoing  Goal: Control of chronic pain  Description  Control of chronic pain  4/20/2019 0255 by Lana Winter RN  Outcome: Ongoing  4/20/2019 0226 by Lana Winter RN  Outcome: Ongoing

## 2019-04-20 NOTE — PLAN OF CARE
Problem: Falls - Risk of:  Goal: Will remain free from falls  Description  Will remain free from falls  Outcome: Ongoing  Goal: Absence of physical injury  Description  Absence of physical injury  Outcome: Ongoing     Problem: Risk for Impaired Skin Integrity  Goal: Tissue integrity - skin and mucous membranes  Description  Structural intactness and normal physiological function of skin and  mucous membranes.   Outcome: Ongoing     Problem: OXYGENATION/RESPIRATORY FUNCTION  Goal: Patient will maintain patent airway  Outcome: Ongoing  Goal: Patient will achieve/maintain normal respiratory rate/effort  Description  Respiratory rate and effort will be within normal limits for the patient  Outcome: Ongoing     Problem: HEMODYNAMIC STATUS  Goal: Patient has stable vital signs and fluid balance  Outcome: Ongoing     Problem: FLUID AND ELECTROLYTE IMBALANCE  Goal: Fluid and electrolyte balance are achieved/maintained  Outcome: Ongoing     Problem: ACTIVITY INTOLERANCE/IMPAIRED MOBILITY  Goal: Mobility/activity is maintained at optimum level for patient  Outcome: Ongoing     Problem: Pain:  Goal: Pain level will decrease  Description  Pain level will decrease  Outcome: Ongoing  Goal: Control of acute pain  Description  Control of acute pain  Outcome: Ongoing  Goal: Control of chronic pain  Description  Control of chronic pain  Outcome: Ongoing

## 2019-04-21 LAB
ALBUMIN SERPL-MCNC: 2.3 G/DL (ref 3.4–5)
ANION GAP SERPL CALCULATED.3IONS-SCNC: 15 MMOL/L (ref 3–16)
BUN BLDV-MCNC: 28 MG/DL (ref 7–20)
CALCIUM SERPL-MCNC: 8.7 MG/DL (ref 8.3–10.6)
CHLORIDE BLD-SCNC: 92 MMOL/L (ref 99–110)
CO2: 34 MMOL/L (ref 21–32)
CREAT SERPL-MCNC: 1.2 MG/DL (ref 0.6–1.2)
GFR AFRICAN AMERICAN: 51
GFR NON-AFRICAN AMERICAN: 42
GLUCOSE BLD-MCNC: 131 MG/DL (ref 70–99)
GLUCOSE BLD-MCNC: 139 MG/DL (ref 70–99)
GLUCOSE BLD-MCNC: 158 MG/DL (ref 70–99)
GLUCOSE BLD-MCNC: 167 MG/DL (ref 70–99)
GLUCOSE BLD-MCNC: 217 MG/DL (ref 70–99)
GLUCOSE BLD-MCNC: 98 MG/DL (ref 70–99)
PERFORMED ON: ABNORMAL
PERFORMED ON: NORMAL
PHOSPHORUS: 2.5 MG/DL (ref 2.5–4.9)
POTASSIUM SERPL-SCNC: 2.6 MMOL/L (ref 3.5–5.1)
SODIUM BLD-SCNC: 141 MMOL/L (ref 136–145)

## 2019-04-21 PROCEDURE — 6370000000 HC RX 637 (ALT 250 FOR IP): Performed by: HOSPITALIST

## 2019-04-21 PROCEDURE — 2060000000 HC ICU INTERMEDIATE R&B

## 2019-04-21 PROCEDURE — 80069 RENAL FUNCTION PANEL: CPT

## 2019-04-21 PROCEDURE — 2580000003 HC RX 258: Performed by: INTERNAL MEDICINE

## 2019-04-21 PROCEDURE — 36415 COLL VENOUS BLD VENIPUNCTURE: CPT

## 2019-04-21 PROCEDURE — 6360000002 HC RX W HCPCS: Performed by: INTERNAL MEDICINE

## 2019-04-21 PROCEDURE — 94760 N-INVAS EAR/PLS OXIMETRY 1: CPT

## 2019-04-21 PROCEDURE — 6370000000 HC RX 637 (ALT 250 FOR IP): Performed by: INTERNAL MEDICINE

## 2019-04-21 PROCEDURE — 6360000002 HC RX W HCPCS: Performed by: HOSPITALIST

## 2019-04-21 RX ORDER — FUROSEMIDE 40 MG/1
40 TABLET ORAL DAILY
Status: DISCONTINUED | OUTPATIENT
Start: 2019-04-22 | End: 2019-04-24

## 2019-04-21 RX ADMIN — POTASSIUM CHLORIDE 10 MEQ: 7.46 INJECTION, SOLUTION INTRAVENOUS at 15:40

## 2019-04-21 RX ADMIN — POTASSIUM CHLORIDE 10 MEQ: 7.46 INJECTION, SOLUTION INTRAVENOUS at 17:16

## 2019-04-21 RX ADMIN — INSULIN LISPRO 5 UNITS: 100 INJECTION, SOLUTION INTRAVENOUS; SUBCUTANEOUS at 13:25

## 2019-04-21 RX ADMIN — POTASSIUM CHLORIDE 10 MEQ: 7.46 INJECTION, SOLUTION INTRAVENOUS at 08:01

## 2019-04-21 RX ADMIN — RIVAROXABAN 15 MG: 15 TABLET, FILM COATED ORAL at 10:14

## 2019-04-21 RX ADMIN — INSULIN LISPRO 4 UNITS: 100 INJECTION, SOLUTION INTRAVENOUS; SUBCUTANEOUS at 13:25

## 2019-04-21 RX ADMIN — CEFEPIME HYDROCHLORIDE 1 G: 1 INJECTION, POWDER, FOR SOLUTION INTRAMUSCULAR; INTRAVENOUS at 17:06

## 2019-04-21 RX ADMIN — Medication 10 ML: at 10:29

## 2019-04-21 RX ADMIN — ASPIRIN 81 MG 81 MG: 81 TABLET ORAL at 10:15

## 2019-04-21 RX ADMIN — POTASSIUM CHLORIDE 10 MEQ: 7.46 INJECTION, SOLUTION INTRAVENOUS at 10:24

## 2019-04-21 RX ADMIN — ALLOPURINOL 100 MG: 100 TABLET ORAL at 10:15

## 2019-04-21 RX ADMIN — POTASSIUM CHLORIDE 10 MEQ: 7.46 INJECTION, SOLUTION INTRAVENOUS at 19:05

## 2019-04-21 RX ADMIN — CEFEPIME HYDROCHLORIDE 1 G: 1 INJECTION, POWDER, FOR SOLUTION INTRAMUSCULAR; INTRAVENOUS at 03:59

## 2019-04-21 RX ADMIN — INSULIN LISPRO 5 UNITS: 100 INJECTION, SOLUTION INTRAVENOUS; SUBCUTANEOUS at 10:15

## 2019-04-21 RX ADMIN — COLCHICINE 0.6 MG: 0.6 TABLET, FILM COATED ORAL at 10:15

## 2019-04-21 RX ADMIN — METOPROLOL SUCCINATE 25 MG: 25 TABLET, EXTENDED RELEASE ORAL at 10:15

## 2019-04-21 RX ADMIN — POTASSIUM CHLORIDE 10 MEQ: 7.46 INJECTION, SOLUTION INTRAVENOUS at 13:56

## 2019-04-21 RX ADMIN — ESCITALOPRAM OXALATE 10 MG: 10 TABLET ORAL at 10:15

## 2019-04-21 RX ADMIN — Medication 10 ML: at 20:15

## 2019-04-21 RX ADMIN — FUROSEMIDE 40 MG: 10 INJECTION, SOLUTION INTRAMUSCULAR; INTRAVENOUS at 10:15

## 2019-04-21 RX ADMIN — PANTOPRAZOLE SODIUM 40 MG: 40 TABLET, DELAYED RELEASE ORAL at 06:07

## 2019-04-21 ASSESSMENT — PAIN SCALES - GENERAL
PAINLEVEL_OUTOF10: 0
PAINLEVEL_OUTOF10: 0

## 2019-04-21 NOTE — PLAN OF CARE
Problem: Falls - Risk of:  Goal: Will remain free from falls  Description  Will remain free from falls  4/21/2019 1210 by Karen Gonzalez RN  Outcome: Ongoing  4/21/2019 0036 by Ryan Pacheco RN  Outcome: Ongoing  Goal: Absence of physical injury  Description  Absence of physical injury  4/21/2019 1210 by Karen Gonzalez RN  Outcome: Ongoing  4/21/2019 0036 by Ryan Pacheco RN  Outcome: Ongoing     Problem: Risk for Impaired Skin Integrity  Goal: Tissue integrity - skin and mucous membranes  Description  Structural intactness and normal physiological function of skin and  mucous membranes.   4/21/2019 1210 by Karen Gonzalez RN  Outcome: Ongoing  4/21/2019 0036 by Ryan Pacheco RN  Outcome: Ongoing     Problem: OXYGENATION/RESPIRATORY FUNCTION  Goal: Patient will maintain patent airway  4/21/2019 1210 by Karen Gonzalez RN  Outcome: Ongoing  4/21/2019 0036 by Ryan Pacheco RN  Outcome: Ongoing  Goal: Patient will achieve/maintain normal respiratory rate/effort  Description  Respiratory rate and effort will be within normal limits for the patient  4/21/2019 1210 by Karen Gonzalez RN  Outcome: Ongoing  4/21/2019 0036 by Ryan Pacheco RN  Outcome: Ongoing     Problem: HEMODYNAMIC STATUS  Goal: Patient has stable vital signs and fluid balance  4/21/2019 1210 by Karen Gonzalez RN  Outcome: Ongoing  4/21/2019 0036 by Ryan Pacheco RN  Outcome: Ongoing     Problem: FLUID AND ELECTROLYTE IMBALANCE  Goal: Fluid and electrolyte balance are achieved/maintained  4/21/2019 1210 by Karen Gonzalez RN  Outcome: Ongoing  4/21/2019 0036 by Ryan Pacheco RN  Outcome: Ongoing     Problem: ACTIVITY INTOLERANCE/IMPAIRED MOBILITY  Goal: Mobility/activity is maintained at optimum level for patient  4/21/2019 1210 by Karen Gonzalez RN  Outcome: Ongoing  4/21/2019 0036 by Ryan Pacheco RN  Outcome: Ongoing     Problem: Pain:  Description  Pain management should include both nonpharmacologic and pharmacologic interventions.   Goal: Pain level will decrease  Description  Pain level will decrease  4/21/2019 1210 by Jose Nur RN  Outcome: Ongoing  4/21/2019 0036 by Kory Contreras RN  Outcome: Ongoing  Goal: Control of acute pain  Description  Control of acute pain  4/21/2019 1210 by Jose Nur RN  Outcome: Ongoing  4/21/2019 0036 by Kory Contreras RN  Outcome: Ongoing  Goal: Control of chronic pain  Description  Control of chronic pain  4/21/2019 1210 by Jose Nur RN  Outcome: Ongoing  4/21/2019 0036 by Kory Contreras RN  Outcome: Ongoing

## 2019-04-21 NOTE — PLAN OF CARE
Problem: Falls - Risk of:  Goal: Will remain free from falls  Description  Will remain free from falls  4/21/2019 0036 by Ryan Pacheco RN  Outcome: Ongoing  4/20/2019 1325 by Karen Gonzalez RN  Outcome: Ongoing  Goal: Absence of physical injury  Description  Absence of physical injury  4/21/2019 0036 by Ryan Pacheco RN  Outcome: Ongoing  4/20/2019 1325 by Karen Gonzalez RN  Outcome: Ongoing     Problem: Risk for Impaired Skin Integrity  Goal: Tissue integrity - skin and mucous membranes  Description  Structural intactness and normal physiological function of skin and  mucous membranes.   4/21/2019 0036 by Ryan Pacheco RN  Outcome: Ongoing  4/20/2019 1325 by Karen Gonzalez RN  Outcome: Ongoing     Problem: OXYGENATION/RESPIRATORY FUNCTION  Goal: Patient will maintain patent airway  4/21/2019 0036 by Ryan Pacheco RN  Outcome: Ongoing  4/20/2019 1325 by Karen Gonzalez RN  Outcome: Ongoing  Goal: Patient will achieve/maintain normal respiratory rate/effort  Description  Respiratory rate and effort will be within normal limits for the patient  4/21/2019 0036 by Ryan Pacheco RN  Outcome: Ongoing  4/20/2019 1325 by Karen Gonzalez RN  Outcome: Ongoing     Problem: HEMODYNAMIC STATUS  Goal: Patient has stable vital signs and fluid balance  4/21/2019 0036 by Ryan Pacheco RN  Outcome: Ongoing  4/20/2019 1325 by Karen Gonzalez RN  Outcome: Ongoing     Problem: FLUID AND ELECTROLYTE IMBALANCE  Goal: Fluid and electrolyte balance are achieved/maintained  4/21/2019 0036 by Ryan Pacheco RN  Outcome: Ongoing  4/20/2019 1325 by Karen Gonzalez RN  Outcome: Ongoing     Problem: ACTIVITY INTOLERANCE/IMPAIRED MOBILITY  Goal: Mobility/activity is maintained at optimum level for patient  4/21/2019 0036 by Ryan Pacheco RN  Outcome: Ongoing  4/20/2019 1325 by Karen Gonzalez RN  Outcome: Ongoing     Problem: Pain:  Goal: Pain level will decrease  Description  Pain level will decrease  4/21/2019 0036 by

## 2019-04-21 NOTE — PROGRESS NOTES
04/19/19  0519 04/20/19  0459   WBC 12.1* 12.7*   HGB 13.1 13.7   HCT 40.9 42.6    239     Recent Labs     04/19/19  0519 04/19/19  1547 04/20/19  0459 04/21/19  0602     --  141 141   K 2.9* 3.8 3.0* 2.6*   CL 95*  --  94* 92*   CO2 36*  --  34* 34*   BUN 27*  --  27* 28*   CREATININE 1.0  --  1.1 1.2   CALCIUM 8.2*  --  8.3 8.7   PHOS  --   --   --  2.5     Recent Labs     04/20/19  0459   AST 25   ALT 15   BILITOT 1.0   ALKPHOS 189*     No results for input(s): INR in the last 72 hours. No results for input(s): Nicole Lacrosse in the last 72 hours. Urinalysis:      Lab Results   Component Value Date    NITRU Negative 04/16/2019    WBCUA 17 04/16/2019    BACTERIA 4+ 03/29/2019    RBCUA 54 04/16/2019    BLOODU MODERATE 04/16/2019    SPECGRAV 1.008 04/16/2019    GLUCOSEU Negative 04/16/2019       Radiology:  XR CHEST STANDARD (2 VW)   Final Result   Mild pulmonary vascular congestion remains but is significantly improved from   the prior study. No sizable pleural effusion appreciated. XR CHEST PORTABLE   Final Result   Features of heart failure, including bilateral pleural effusions and moderate   to severe pulmonary edema. Superimposed infection could be present in the   appropriate clinical context. XR CHEST PORTABLE   Final Result   Mild progressive perihilar opacities which may represent developing pulmonary   edema or progressive bronchopneumonia. Assessment/Plan:    Active Hospital Problems    Diagnosis Date Noted    Sepsis [facility]) [A41.9] 04/15/2019    Acute respiratory failure with hypoxia (HCC) [J96.01]     Abnormal radiograph [R93.89]        Sepsis-present on admission - resolved   Possible due to pneumonia,   FIlm array with coronavirus. Acute on chronic systolic diastolic CHF, Echocardiogram shows ejection fraction 35-40%,   S/p lasix 40IV BID - over & L negative - will switch to lasix PO today  K replace per protocol.    Daily renal panel Check Mg level tomorrow. Acute hypoxic respiratory failure with hypoxia, Titrate oxygen for saturations greater than or equal to 90%, due to pneumonia and heart failure, clinically improving. Hypokalemia, will replace with iv and po supplements.    Check Mg level tomorrow am.       Chronic kidney disease stage III, stable    continue to monitor creatinine while patient is on diuretics. Pneumonia, possibly gram-negative, healthcare associated pneumonia  continue patient on cefepime - day 7/7 today   MRSA negative. Film array with coronavirus. Dysphagia, Speech therapy evaluation      Atrial fibrillation - confirmed diagnosis last admit in late march 2019  controlled with Metoprolol and Xarelto      Oropharyngeal Dysphagia, Hx of esophageal strictures s/p dilation in Dec, Hx of Hiatal Hernia  Pt on modified diet since last admit in late March 2019. No nausea or vomiting reported. This admit she presented with pulm edema, LE edema and uncontrolled hyperglycemia - I am less concerned about active dysphagia. Diet: DIET DYSPHAGIA I PUREED; Dysphagia I Pureed; Nectar Thick  Code Status: DNR-CCA      Discussed with  and daughter at bedside. Po diuretic. Plan to return to ECF 24-4hrs. Replete K. Overall prognosis poor.      Deven Dumont MD

## 2019-04-22 LAB
ALBUMIN SERPL-MCNC: 2.4 G/DL (ref 3.4–5)
ANION GAP SERPL CALCULATED.3IONS-SCNC: 12 MMOL/L (ref 3–16)
BUN BLDV-MCNC: 27 MG/DL (ref 7–20)
CALCIUM SERPL-MCNC: 8.6 MG/DL (ref 8.3–10.6)
CHLORIDE BLD-SCNC: 98 MMOL/L (ref 99–110)
CO2: 31 MMOL/L (ref 21–32)
CREAT SERPL-MCNC: 1.1 MG/DL (ref 0.6–1.2)
GFR AFRICAN AMERICAN: 57
GFR NON-AFRICAN AMERICAN: 47
GLUCOSE BLD-MCNC: 100 MG/DL (ref 70–99)
GLUCOSE BLD-MCNC: 120 MG/DL (ref 70–99)
GLUCOSE BLD-MCNC: 143 MG/DL (ref 70–99)
GLUCOSE BLD-MCNC: 154 MG/DL (ref 70–99)
GLUCOSE BLD-MCNC: 216 MG/DL (ref 70–99)
GLUCOSE BLD-MCNC: 278 MG/DL (ref 70–99)
GLUCOSE BLD-MCNC: 59 MG/DL (ref 70–99)
GLUCOSE BLD-MCNC: 64 MG/DL (ref 70–99)
MAGNESIUM: 1.2 MG/DL (ref 1.8–2.4)
PERFORMED ON: ABNORMAL
PHOSPHORUS: 2.2 MG/DL (ref 2.5–4.9)
POTASSIUM SERPL-SCNC: 3.3 MMOL/L (ref 3.5–5.1)
SODIUM BLD-SCNC: 141 MMOL/L (ref 136–145)

## 2019-04-22 PROCEDURE — 2580000003 HC RX 258: Performed by: INTERNAL MEDICINE

## 2019-04-22 PROCEDURE — 80069 RENAL FUNCTION PANEL: CPT

## 2019-04-22 PROCEDURE — 6370000000 HC RX 637 (ALT 250 FOR IP): Performed by: INTERNAL MEDICINE

## 2019-04-22 PROCEDURE — 36415 COLL VENOUS BLD VENIPUNCTURE: CPT

## 2019-04-22 PROCEDURE — 83735 ASSAY OF MAGNESIUM: CPT

## 2019-04-22 PROCEDURE — 6360000002 HC RX W HCPCS: Performed by: INTERNAL MEDICINE

## 2019-04-22 PROCEDURE — 6370000000 HC RX 637 (ALT 250 FOR IP): Performed by: HOSPITALIST

## 2019-04-22 PROCEDURE — 94760 N-INVAS EAR/PLS OXIMETRY 1: CPT

## 2019-04-22 PROCEDURE — 2060000000 HC ICU INTERMEDIATE R&B

## 2019-04-22 RX ORDER — MAGNESIUM SULFATE IN WATER 40 MG/ML
4 INJECTION, SOLUTION INTRAVENOUS ONCE
Status: COMPLETED | OUTPATIENT
Start: 2019-04-22 | End: 2019-04-22

## 2019-04-22 RX ORDER — POTASSIUM CHLORIDE 7.45 MG/ML
10 INJECTION INTRAVENOUS
Status: COMPLETED | OUTPATIENT
Start: 2019-04-22 | End: 2019-04-22

## 2019-04-22 RX ORDER — POTASSIUM CHLORIDE 7.45 MG/ML
20 INJECTION INTRAVENOUS ONCE
Status: DISCONTINUED | OUTPATIENT
Start: 2019-04-22 | End: 2019-04-22

## 2019-04-22 RX ORDER — POTASSIUM CHLORIDE 750 MG/1
20 TABLET, FILM COATED, EXTENDED RELEASE ORAL 2 TIMES DAILY
Status: DISCONTINUED | OUTPATIENT
Start: 2019-04-22 | End: 2019-04-24

## 2019-04-22 RX ADMIN — INSULIN LISPRO 2 UNITS: 100 INJECTION, SOLUTION INTRAVENOUS; SUBCUTANEOUS at 22:17

## 2019-04-22 RX ADMIN — Medication 10 ML: at 22:16

## 2019-04-22 RX ADMIN — ESCITALOPRAM OXALATE 10 MG: 10 TABLET ORAL at 08:49

## 2019-04-22 RX ADMIN — RIVAROXABAN 15 MG: 15 TABLET, FILM COATED ORAL at 08:49

## 2019-04-22 RX ADMIN — Medication 10 ML: at 08:50

## 2019-04-22 RX ADMIN — POTASSIUM CHLORIDE 20 MEQ: 750 TABLET, EXTENDED RELEASE ORAL at 22:16

## 2019-04-22 RX ADMIN — METOPROLOL SUCCINATE 25 MG: 25 TABLET, EXTENDED RELEASE ORAL at 08:50

## 2019-04-22 RX ADMIN — MAGNESIUM SULFATE HEPTAHYDRATE 4 G: 40 INJECTION, SOLUTION INTRAVENOUS at 08:59

## 2019-04-22 RX ADMIN — CEFEPIME HYDROCHLORIDE 1 G: 1 INJECTION, POWDER, FOR SOLUTION INTRAMUSCULAR; INTRAVENOUS at 04:15

## 2019-04-22 RX ADMIN — POTASSIUM CHLORIDE 10 MEQ: 7.46 INJECTION, SOLUTION INTRAVENOUS at 10:22

## 2019-04-22 RX ADMIN — ALLOPURINOL 100 MG: 100 TABLET ORAL at 08:50

## 2019-04-22 RX ADMIN — INSULIN LISPRO 5 UNITS: 100 INJECTION, SOLUTION INTRAVENOUS; SUBCUTANEOUS at 10:08

## 2019-04-22 RX ADMIN — INSULIN LISPRO 1 UNITS: 100 INJECTION, SOLUTION INTRAVENOUS; SUBCUTANEOUS at 10:13

## 2019-04-22 RX ADMIN — PANTOPRAZOLE SODIUM 40 MG: 40 TABLET, DELAYED RELEASE ORAL at 09:06

## 2019-04-22 RX ADMIN — COLCHICINE 0.6 MG: 0.6 TABLET, FILM COATED ORAL at 08:50

## 2019-04-22 RX ADMIN — INSULIN LISPRO 5 UNITS: 100 INJECTION, SOLUTION INTRAVENOUS; SUBCUTANEOUS at 13:10

## 2019-04-22 RX ADMIN — POTASSIUM CHLORIDE 20 MEQ: 750 TABLET, EXTENDED RELEASE ORAL at 08:59

## 2019-04-22 RX ADMIN — POTASSIUM CHLORIDE 10 MEQ: 7.46 INJECTION, SOLUTION INTRAVENOUS at 08:59

## 2019-04-22 RX ADMIN — INSULIN LISPRO 6 UNITS: 100 INJECTION, SOLUTION INTRAVENOUS; SUBCUTANEOUS at 13:06

## 2019-04-22 RX ADMIN — FUROSEMIDE 40 MG: 40 TABLET ORAL at 08:49

## 2019-04-22 ASSESSMENT — PAIN SCALES - GENERAL
PAINLEVEL_OUTOF10: 0

## 2019-04-22 ASSESSMENT — PAIN SCALES - WONG BAKER
WONGBAKER_NUMERICALRESPONSE: 0
WONGBAKER_NUMERICALRESPONSE: 0

## 2019-04-22 NOTE — PROGRESS NOTES
Nutrition Assessment    Type and Reason for Visit: Initial(LOS)    Nutrition Recommendations:   Add Magic Cup bid  NFPE when appropriate    Nutrition Assessment: Pt at risk for nutrition compromise r/t increased needs, altered GI, altered mentation, altered labs r/t infection, need for mechanically altered texture / thickened liquids, dx of dementia with risk to not sense hunger cues, current issue with sleepiness, endocrine, renal & cardiac dysfunction. Pt found safest with Dysphagia l / nectar thick liquids. No family in room so information taken from medical record. Intake looks to be variable at 25-75%. Will start Magic Cup bid. Malnutrition Assessment:  · Malnutrition Status: Insufficient data    Nutrition Risk Level: Moderate    Nutrient Needs:  · Estimated Daily Total Kcal: 0317-7682 (25-30 x ABW of 55 kg)  · Estimated Daily Protein (g): 41-55 (.75-1 x ABW (adj for CKD stage 3)  · Estimated Daily Total Fluid (ml/day): 1 ml per kcal    Nutrition Diagnosis:   · Problem: Inadequate oral intake  · Etiology: related to Insufficient energy/nutrient consumption     Signs and symptoms:  as evidenced by Intake 25-50%, Intake 50-75%    Objective Information:  · Nutrition-Focused Physical Findings: BM 4/22. Noted +1 edema to BLE.    · Wound Type: None  · Current Nutrition Therapies:  · Oral Diet Orders: Dysphagia 1 (Pureed), Nectar Thick   · Oral Diet intake: 26-50%, 51-75%  · Anthropometric Measures:  · Ht: 5' 2\" (157.5 cm)   · Current Body Wt: 121 lb (54.9 kg)  · Admission Body Wt: 134 lb (60.8 kg)  · % Weight Change:  ,  Noted decreasing wt trend but not at significant rate  · Ideal Body Wt: 110 lb (49.9 kg), % Ideal Body    · BMI Classification: BMI 18.5 - 24.9 Normal Weight    Nutrition Interventions:   Continue current diet, Start ONS  Continued Inpatient Monitoring, Education Not Indicated    Nutrition Evaluation:   · Evaluation: Goals set   · Goals: tolerate most appropriate form of nutrition

## 2019-04-22 NOTE — PROGRESS NOTES
Patient had 7 beats VTach at 04:01. Patient is sleeping quietly and appears asymptomatic. Will continue to monitor.

## 2019-04-22 NOTE — PROGRESS NOTES
Speech Language Pathology    Dysphagia tx attempted. Patient sleeping upon SLP entry to room. Patient awakened and agreeable to tx but would nod off back to sleep with each exercise attempt. ST to re-attempt as schedule permits. RN reports patient tolerating current diet well. Thank you. Marita Stringer, [id number] [facility], [id number]  Speech-Language Pathologist

## 2019-04-22 NOTE — PROGRESS NOTES
Hospitalist Progress Note      PCP: Ap Contreras MD    Date of Admission: 4/15/2019        Subjective:    Drowsy, sleeping a lot this am.     Poor appetite. Not on any sedatives. Medications:  Reviewed    Infusion Medications    dextrose       Scheduled Medications    magnesium sulfate  4 g Intravenous Once    potassium chloride  20 mEq Oral BID    furosemide  40 mg Oral Daily    insulin lispro  5 Units Subcutaneous TID WC    insulin lispro  0-12 Units Subcutaneous TID WC    insulin lispro  0-6 Units Subcutaneous Nightly    allopurinol  100 mg Oral Daily    aspirin  81 mg Oral Every Other Day    colchicine  0.6 mg Oral Daily    escitalopram  10 mg Oral Daily    metoprolol succinate  25 mg Oral Daily    pantoprazole  40 mg Oral Daily    rivaroxaban  15 mg Oral Daily    sodium chloride flush  10 mL Intravenous 2 times per day    cefepime  1 g Intravenous Q12H     PRN Meds: glucose, dextrose, glucagon (rDNA), dextrose, sodium chloride flush, perflutren lipid microspheres, perflutren lipid microspheres      Intake/Output Summary (Last 24 hours) at 4/22/2019 1247  Last data filed at 4/22/2019 1134  Gross per 24 hour   Intake 360 ml   Output 1275 ml   Net -915 ml       Physical Exam Performed:    /83   Pulse 86   Temp 97.4 °F (36.3 °C) (Axillary)   Resp 16   Ht 5' 2\" (1.575 m)   Wt 121 lb 7.6 oz (55.1 kg)   SpO2 98%   BMI 22.22 kg/m²     General appearance: No apparent distress  Neck: Supple  Respiratory:  Normal respiratory effort. Clear to auscultation, bilaterally without Rales/Wheezes/Rhonchi. Cardiovascular: Regular rate and rhythm with normal S1/S2 without murmurs, rubs or gallops. Abdomen: Soft, non-tender  Musculoskeletal: No clubbing. Bilateral leg edema   Skin: Skin color, texture, turgor normal.  No rashes or lesions.   Neurologic:  No focal weakness   Psychiatric: Alert   Capillary Refill: Brisk,< 3 seconds   Peripheral Pulses: +2 palpable, equal bilaterally for saturations greater than or equal to 90%, due to pneumonia and heart failure, clinically improving. Hypokalemia, improved with iv and po supplements.    Replete Mg IV. Chronic kidney disease stage III, stable    continue to monitor creatinine while patient is on diuretics - stable. Pneumonia, possibly gram-negative, healthcare associated pneumonia  continue patient on cefepime - day 7/7 4/21/2019 - completed   MRSA negative. Film array with coronavirus. Atrial fibrillation - confirmed diagnosis last admit in late march 2019  controlled with Metoprolol and Xarelto        Oropharyngeal Dysphagia, Hx of esophageal strictures s/p dilation in Dec, Hx of Hiatal Hernia  Pt on modified diet since last admit in late March 2019. No nausea or vomiting reported. This admit she presented with pulm edema, LE edema and uncontrolled hyperglycemia - I am less concerned about active dysphagia. Diet: DIET DYSPHAGIA I PUREED; Dysphagia I Pureed; Nectar Thick  Code Status: DNR-CCA      Discussed with  and daughter at bedside. Overall prognosis poor, condition has been declining last several months. Plan to return to [facility] tomorrow.        Alex Marr MD

## 2019-04-22 NOTE — PLAN OF CARE
Problem: Falls - Risk of:  Goal: Will remain free from falls  Description  Will remain free from falls  Outcome: Ongoing  Goal: Absence of physical injury  Description  Absence of physical injury  Outcome: Ongoing     Problem: Risk for Impaired Skin Integrity  Goal: Tissue integrity - skin and mucous membranes  Description  Structural intactness and normal physiological function of skin and  mucous membranes.   Outcome: Ongoing     Problem: OXYGENATION/RESPIRATORY FUNCTION  Goal: Patient will maintain patent airway  Outcome: Ongoing  Goal: Patient will achieve/maintain normal respiratory rate/effort  Description  Respiratory rate and effort will be within normal limits for the patient  Outcome: Ongoing     Problem: HEMODYNAMIC STATUS  Goal: Patient has stable vital signs and fluid balance  Outcome: Ongoing     Problem: ACTIVITY INTOLERANCE/IMPAIRED MOBILITY  Goal: Mobility/activity is maintained at optimum level for patient  Outcome: Ongoing     Problem: Pain:  Goal: Pain level will decrease  Description  Pain level will decrease  Outcome: Ongoing  Goal: Control of acute pain  Description  Control of acute pain  Outcome: Ongoing  Goal: Control of chronic pain  Description  Control of chronic pain  Outcome: Ongoing

## 2019-04-22 NOTE — PLAN OF CARE
Problem: Falls - Risk of:  Goal: Will remain free from falls  Description  Will remain free from falls  4/22/2019 1334 by Janine Gutierrez RN  Outcome: Ongoing  4/22/2019 0300 by Thania Morejon RN  Outcome: Ongoing  Goal: Absence of physical injury  Description  Absence of physical injury  4/22/2019 1334 by Janine Gutierrez RN  Outcome: Ongoing  4/22/2019 0300 by Thania Morejon RN  Outcome: Ongoing     Problem: Risk for Impaired Skin Integrity  Goal: Tissue integrity - skin and mucous membranes  Description  Structural intactness and normal physiological function of skin and  mucous membranes.   4/22/2019 1334 by Janine Guteirrez RN  Outcome: Ongoing  4/22/2019 0300 by Thania Morejon RN  Outcome: Ongoing     Problem: OXYGENATION/RESPIRATORY FUNCTION  Goal: Patient will maintain patent airway  4/22/2019 1334 by Janine Gutierrez RN  Outcome: Ongoing  4/22/2019 0300 by Thania Morejon RN  Outcome: Ongoing  Goal: Patient will achieve/maintain normal respiratory rate/effort  Description  Respiratory rate and effort will be within normal limits for the patient  4/22/2019 1334 by Jannie Gutierrez RN  Outcome: Ongoing  4/22/2019 0300 by Thania Morejon RN  Outcome: Ongoing     Problem: HEMODYNAMIC STATUS  Goal: Patient has stable vital signs and fluid balance  4/22/2019 1334 by Janine Gutierrez RN  Outcome: Ongoing  4/22/2019 0300 by Thania Morejon RN  Outcome: Ongoing     Problem: FLUID AND ELECTROLYTE IMBALANCE  Goal: Fluid and electrolyte balance are achieved/maintained  4/22/2019 1334 by Janine Gutierrez RN  Outcome: Ongoing  4/22/2019 0300 by Thania Morejon RN  Outcome: Ongoing     Problem: ACTIVITY INTOLERANCE/IMPAIRED MOBILITY  Goal: Mobility/activity is maintained at optimum level for patient  4/22/2019 1334 by Janine Gutierrez RN  Outcome: Ongoing  4/22/2019 0300 by Thania Morejon RN  Outcome: Ongoing     Problem: Pain:  Goal: Pain level will decrease  Description  Pain level will decrease  4/22/2019 1334 by Pineda Jurado RN  Outcome: Ongoing  4/22/2019 0300 by Jillian Chapa RN  Outcome: Ongoing  Goal: Control of acute pain  Description  Control of acute pain  4/22/2019 1334 by Pineda Jurado RN  Outcome: Ongoing  4/22/2019 0300 by Jillian Chapa RN  Outcome: Ongoing  Goal: Control of chronic pain  Description  Control of chronic pain  4/22/2019 1334 by Pineda Jurado RN  Outcome: Ongoing  4/22/2019 0300 by Jillian Chapa RN  Outcome: Ongoing

## 2019-04-23 ENCOUNTER — APPOINTMENT (OUTPATIENT)
Dept: GENERAL RADIOLOGY | Age: 84
DRG: 871 | End: 2019-04-23
Payer: MEDICARE

## 2019-04-23 LAB
ALBUMIN SERPL-MCNC: 2.2 G/DL (ref 3.4–5)
ANION GAP SERPL CALCULATED.3IONS-SCNC: 9 MMOL/L (ref 3–16)
BASOPHILS ABSOLUTE: 0 K/UL (ref 0–0.2)
BASOPHILS RELATIVE PERCENT: 0.3 %
BUN BLDV-MCNC: 27 MG/DL (ref 7–20)
CALCIUM SERPL-MCNC: 8.8 MG/DL (ref 8.3–10.6)
CHLORIDE BLD-SCNC: 100 MMOL/L (ref 99–110)
CO2: 33 MMOL/L (ref 21–32)
CREAT SERPL-MCNC: 1.1 MG/DL (ref 0.6–1.2)
EOSINOPHILS ABSOLUTE: 0.4 K/UL (ref 0–0.6)
EOSINOPHILS RELATIVE PERCENT: 2.9 %
GFR AFRICAN AMERICAN: 57
GFR NON-AFRICAN AMERICAN: 47
GLUCOSE BLD-MCNC: 153 MG/DL (ref 70–99)
GLUCOSE BLD-MCNC: 173 MG/DL (ref 70–99)
GLUCOSE BLD-MCNC: 183 MG/DL (ref 70–99)
GLUCOSE BLD-MCNC: 191 MG/DL (ref 70–99)
GLUCOSE BLD-MCNC: 197 MG/DL (ref 70–99)
GLUCOSE BLD-MCNC: 260 MG/DL (ref 70–99)
HCT VFR BLD CALC: 40 % (ref 36–48)
HEMOGLOBIN: 12.5 G/DL (ref 12–16)
INR BLD: 1.96 (ref 0.86–1.14)
LYMPHOCYTES ABSOLUTE: 1.4 K/UL (ref 1–5.1)
LYMPHOCYTES RELATIVE PERCENT: 11.3 %
MAGNESIUM: 2.2 MG/DL (ref 1.8–2.4)
MCH RBC QN AUTO: 26.4 PG (ref 26–34)
MCHC RBC AUTO-ENTMCNC: 31.4 G/DL (ref 31–36)
MCV RBC AUTO: 84.2 FL (ref 80–100)
MONOCYTES ABSOLUTE: 1.1 K/UL (ref 0–1.3)
MONOCYTES RELATIVE PERCENT: 8.8 %
NEUTROPHILS ABSOLUTE: 9.6 K/UL (ref 1.7–7.7)
NEUTROPHILS RELATIVE PERCENT: 76.7 %
PDW BLD-RTO: 19.9 % (ref 12.4–15.4)
PERFORMED ON: ABNORMAL
PHOSPHORUS: 2.2 MG/DL (ref 2.5–4.9)
PLATELET # BLD: 209 K/UL (ref 135–450)
PMV BLD AUTO: 9.8 FL (ref 5–10.5)
POTASSIUM SERPL-SCNC: 3.7 MMOL/L (ref 3.5–5.1)
PROTHROMBIN TIME: 22.3 SEC (ref 9.8–13)
RBC # BLD: 4.75 M/UL (ref 4–5.2)
SODIUM BLD-SCNC: 142 MMOL/L (ref 136–145)
WBC # BLD: 12.5 K/UL (ref 4–11)

## 2019-04-23 PROCEDURE — 2580000003 HC RX 258: Performed by: INTERNAL MEDICINE

## 2019-04-23 PROCEDURE — 36415 COLL VENOUS BLD VENIPUNCTURE: CPT

## 2019-04-23 PROCEDURE — 6370000000 HC RX 637 (ALT 250 FOR IP): Performed by: INTERNAL MEDICINE

## 2019-04-23 PROCEDURE — 2060000000 HC ICU INTERMEDIATE R&B

## 2019-04-23 PROCEDURE — 83735 ASSAY OF MAGNESIUM: CPT

## 2019-04-23 PROCEDURE — 6370000000 HC RX 637 (ALT 250 FOR IP): Performed by: HOSPITALIST

## 2019-04-23 PROCEDURE — 80069 RENAL FUNCTION PANEL: CPT

## 2019-04-23 PROCEDURE — 71045 X-RAY EXAM CHEST 1 VIEW: CPT

## 2019-04-23 PROCEDURE — 94760 N-INVAS EAR/PLS OXIMETRY 1: CPT

## 2019-04-23 PROCEDURE — 6360000002 HC RX W HCPCS: Performed by: INTERNAL MEDICINE

## 2019-04-23 PROCEDURE — 85610 PROTHROMBIN TIME: CPT

## 2019-04-23 PROCEDURE — 85025 COMPLETE CBC W/AUTO DIFF WBC: CPT

## 2019-04-23 PROCEDURE — C9113 INJ PANTOPRAZOLE SODIUM, VIA: HCPCS | Performed by: INTERNAL MEDICINE

## 2019-04-23 PROCEDURE — 92526 ORAL FUNCTION THERAPY: CPT

## 2019-04-23 RX ORDER — PANTOPRAZOLE SODIUM 40 MG/10ML
40 INJECTION, POWDER, LYOPHILIZED, FOR SOLUTION INTRAVENOUS 2 TIMES DAILY
Status: DISCONTINUED | OUTPATIENT
Start: 2019-04-23 | End: 2019-04-23

## 2019-04-23 RX ORDER — 0.9 % SODIUM CHLORIDE 0.9 %
10 VIAL (ML) INJECTION ONCE
Status: DISCONTINUED | OUTPATIENT
Start: 2019-04-23 | End: 2019-04-23 | Stop reason: SDUPTHER

## 2019-04-23 RX ORDER — PANTOPRAZOLE SODIUM 40 MG/10ML
80 INJECTION, POWDER, LYOPHILIZED, FOR SOLUTION INTRAVENOUS ONCE
Status: DISCONTINUED | OUTPATIENT
Start: 2019-04-23 | End: 2019-04-23 | Stop reason: SDUPTHER

## 2019-04-23 RX ORDER — 0.9 % SODIUM CHLORIDE 0.9 %
10 VIAL (ML) INJECTION 2 TIMES DAILY
Status: DISCONTINUED | OUTPATIENT
Start: 2019-04-23 | End: 2019-04-23

## 2019-04-23 RX ORDER — 0.9 % SODIUM CHLORIDE 0.9 %
10 VIAL (ML) INJECTION DAILY
Status: DISCONTINUED | OUTPATIENT
Start: 2019-04-23 | End: 2019-04-23

## 2019-04-23 RX ORDER — SODIUM CHLORIDE 9 MG/ML
INJECTION, SOLUTION INTRAVENOUS CONTINUOUS
Status: DISCONTINUED | OUTPATIENT
Start: 2019-04-23 | End: 2019-04-25

## 2019-04-23 RX ORDER — FUROSEMIDE 20 MG/1
40 TABLET ORAL DAILY
Qty: 30 TABLET | Refills: 0 | Status: SHIPPED | OUTPATIENT
Start: 2019-04-23 | End: 2019-04-26 | Stop reason: HOSPADM

## 2019-04-23 RX ORDER — PANTOPRAZOLE SODIUM 40 MG/10ML
80 INJECTION, POWDER, LYOPHILIZED, FOR SOLUTION INTRAVENOUS ONCE
Status: DISCONTINUED | OUTPATIENT
Start: 2019-04-23 | End: 2019-04-23

## 2019-04-23 RX ADMIN — ESCITALOPRAM OXALATE 10 MG: 10 TABLET ORAL at 10:35

## 2019-04-23 RX ADMIN — ASPIRIN 81 MG 81 MG: 81 TABLET ORAL at 10:35

## 2019-04-23 RX ADMIN — INSULIN LISPRO 2 UNITS: 100 INJECTION, SOLUTION INTRAVENOUS; SUBCUTANEOUS at 10:36

## 2019-04-23 RX ADMIN — Medication 10 ML: at 22:02

## 2019-04-23 RX ADMIN — METOPROLOL SUCCINATE 25 MG: 25 TABLET, EXTENDED RELEASE ORAL at 10:35

## 2019-04-23 RX ADMIN — FUROSEMIDE 40 MG: 40 TABLET ORAL at 10:35

## 2019-04-23 RX ADMIN — SODIUM CHLORIDE 8 MG/HR: 9 INJECTION, SOLUTION INTRAVENOUS at 22:39

## 2019-04-23 RX ADMIN — POTASSIUM CHLORIDE 20 MEQ: 750 TABLET, EXTENDED RELEASE ORAL at 11:48

## 2019-04-23 RX ADMIN — Medication 10 ML: at 22:10

## 2019-04-23 RX ADMIN — SODIUM CHLORIDE 80 MG: 9 INJECTION, SOLUTION INTRAVENOUS at 22:02

## 2019-04-23 RX ADMIN — RIVAROXABAN 15 MG: 15 TABLET, FILM COATED ORAL at 10:35

## 2019-04-23 RX ADMIN — PANTOPRAZOLE SODIUM 40 MG: 40 TABLET, DELAYED RELEASE ORAL at 06:13

## 2019-04-23 RX ADMIN — COLCHICINE 0.6 MG: 0.6 TABLET, FILM COATED ORAL at 10:36

## 2019-04-23 RX ADMIN — SODIUM CHLORIDE: 9 INJECTION, SOLUTION INTRAVENOUS at 22:02

## 2019-04-23 RX ADMIN — ALLOPURINOL 100 MG: 100 TABLET ORAL at 10:35

## 2019-04-23 ASSESSMENT — PAIN SCALES - GENERAL
PAINLEVEL_OUTOF10: 0
PAINLEVEL_OUTOF10: 0

## 2019-04-23 ASSESSMENT — PAIN SCALES - WONG BAKER: WONGBAKER_NUMERICALRESPONSE: 0

## 2019-04-23 NOTE — CARE COORDINATION
DISCHARGE SUMMARY     DATE OF DISCHARGE: 04/23/2019    DISCHARGE DESTINATION: [address]    Level of Care: Skilled    Report Number: [phone]  Fax Number:  [phone]    Hens previously completed-facility already has this    TRANSPORTATION: [facility] Name:  [address] [place] [address] up Time: 1700    Phone Number: [phone]    COMMENTS: VANDA met with pts family to confirm d/c plans. Pts family is agreeable to a d/c back to [facility] for skilled care. VANDA spoke with [facility] and confirmed that this pt is able to return to this facility this evening. Transportation has been arranged for 1700 with [facility]. RN and ECF aware of d/c time.   Electronically signed by Gilbert Castillo on 4/23/2019 at 2:12 PM

## 2019-04-23 NOTE — PROGRESS NOTES
Transport arrives for pt dc at this time. Nurse advises transport we are waiting for further orders from MD. Transport to wait by pt room.  Electronically signed by Elsa Viera RN on 4/23/2019 at 7:03 PM

## 2019-04-23 NOTE — PROGRESS NOTES
Pharmacy Heart Failure Medication Reconciliation Note    Pt discharged from [facility] today after admission for respiratory failure. Evangelista Thurston has a diagnosis of systolic heart failure (last EF = 35-40% on 4/15/19). Patient taking an ACEI / ARB / Entresto:  No: due to reduced renal function per Dr. Calderon Stringer      Patient taking a BETA BLOCKER:  Yes  Patient taking a LOOP DIURETIC: Yes  Patient taking a ALDOSTERONE RECEPTOR ANTAGONIST: No: EF >35%     Corrections to discharge medications include:  none    Discharge Medications:         Medication List        START taking these medications      insulin lispro 100 UNIT/ML injection vial  Commonly known as:  HUMALOG  Inject 5 Units into the skin 3 times daily (with meals) Plus per sliding scale **Medium Dose Corrective Algorithm** Glucose: Dose: If <139             No Insulin 140-199 2 Units 200-249 4 Units 250-299 6 Units 300-349 8 Units 350-400 10 Units Above 400       12 Units            CHANGE how you take these medications      furosemide 20 MG tablet  Commonly known as:  LASIX  Take 2 tablets by mouth daily  What changed:    how much to take  when to take this  Another medication with the same name was removed. Continue taking this medication, and follow the directions you see here.             CONTINUE taking these medications      allopurinol 100 MG tablet  Commonly known as:  ZYLOPRIM  TAKE 0.5 TABLETS BY MOUTH DAILY FOR FOUR WEEKS AND THEN INCREASE TO 1 TABLET DAILY     aspirin 81 MG tablet     COLCRYS 0.6 MG tablet  Generic drug:  colchicine  TAKE ONE TABLET BY MOUTH DAILY     Cyanocobalamin 1000 MCG/ML Kit  Inject 1,000 mcg weekly for 4 weeks, then monthly afterward     escitalopram 10 MG tablet  Commonly known as:  LEXAPRO  TAKE ONE TABLET BY MOUTH DAILY     folic acid 1 MG tablet  Commonly known as:  FOLVITE  TAKE 5 TABLETS (TOTAL 5MG) BY MOUTH DAILY     metoprolol succinate 25 MG extended release tablet  Commonly known as:  TOPROL XL  Take 1 tablet by mouth

## 2019-04-23 NOTE — DISCHARGE SUMMARY
Hospital Medicine Discharge Summary    Patient ID: Tez Michel      Patient's PCP: Jacinda Espinoza MD    Admit Date: 4/15/2019     Discharge Date:   4/23/2019    Admitting Physician: Connor Alexander MD     Discharge Physician: Yamileth Wright MD     Discharge Diagnoses: Active Hospital Problems    Diagnosis Date Noted    Sepsis [facility]) [A41.9] 04/15/2019    Acute respiratory failure with hypoxia (HCC) [J96.01]     Abnormal radiograph [R93.89]        The patient was seen and examined on day of discharge and this discharge summary is in conjunction with any daily progress note from day of discharge. Hospital Course: 81yo woman with baseline dementia, Hx of dysphagia s/p esophageal dilation in Dec 2018, also on modified diet with Hx of aspiration, DMII, ECF resident, presented from [facility] with complaints of leg edema. Admitted with CHF, suspected PNA and severe hyperglycemia. Sepsis-present on admission - resolved   Possible due to pneumonia  FIlm array with coronavirus.         Acute on chronic systolic diastolic CHF, Echocardiogram shows ejection fraction 35-40%,   S/p lasix 40IV BID - over 8 L negative - switched to lasix PO 4/22  Replete K  Replete Mg IV  Weight 146lbs to 123lbs on day of discharge.        Acute hypoxic respiratory failure with hypoxia, Titrate oxygen for saturations greater than or equal to 90%, due to pneumonia and heart failure, clinically improving.        Hypokalemia, improved with iv and po supplements.    Replete Mg IV.         Chronic kidney disease stage III, stable    continue to monitor creatinine while patient is on diuretics - stable.           Pneumonia, possibly gram-negative, healthcare associated pneumonia  continued patient on cefepime - day 7/7 4/21/2019 - completed   MRSA negative.    Film array with coronavirus.            Atrial fibrillation - confirmed diagnosis last admit in late march 2019  controlled with Metoprolol and Xarelto           Oropharyngeal Dysphagia, Hx of esophageal strictures s/p dilation in Dec, Hx of Hiatal Hernia  Pt on modified diet since last admit in late March 2019. No nausea or vomiting reported. This admit she presented with pulm edema, LE edema and uncontrolled hyperglycemia - I am less concerned about active dysphagia. DMII - presented with severe hyperglycemia. BG better now. It appears she has Hx of DMII, but controlled with diet prior? Will continue with humalog 5 units with meals and ISS coverage and advise on PCP follow up and further regimen adjustment.          Overall condition very frail and functional capacity minimal. Prognosis very poor. Physical Exam Performed:     /63   Pulse 71   Temp 98.3 °F (36.8 °C)   Resp 16   Ht 5' 2\" (1.575 m)   Wt 123 lb 7.3 oz (56 kg)   SpO2 97%   BMI 22.58 kg/m²     General appearance: No apparent distress  Neck: Supple  Respiratory:  Normal respiratory effort. Clear to auscultation, bilaterally without Rales/Wheezes/Rhonchi. Cardiovascular: Regular rate and rhythm with normal S1/S2 without murmurs, rubs or gallops. Abdomen: Soft, non-tender  Musculoskeletal: No clubbing. Bilateral leg edema   Skin: Skin color, texture, turgor normal.  No rashes or lesions. Neurologic:  No focal weakness   Psychiatric: Alert   Capillary Refill: Brisk,< 3 seconds   Peripheral Pulses: +2 palpable, equal bilaterally             Labs:  For convenience and continuity at follow-up the following most recent labs are provided:      CBC:    Lab Results   Component Value Date    WBC 12.7 04/20/2019    HGB 13.7 04/20/2019    HCT 42.6 04/20/2019     04/20/2019       Renal:    Lab Results   Component Value Date     04/23/2019    K 3.7 04/23/2019    K 4.1 04/02/2019     04/23/2019    CO2 33 04/23/2019    BUN 27 04/23/2019    CREATININE 1.1 04/23/2019    CALCIUM 8.8 04/23/2019    PHOS 2.2 04/23/2019         Significant Diagnostic Studies    Radiology:   XR CHEST STANDARD (2 VW)   Final Result   Mild pulmonary vascular congestion remains but is significantly improved from   the prior study. No sizable pleural effusion appreciated. XR CHEST PORTABLE   Final Result   Features of heart failure, including bilateral pleural effusions and moderate   to severe pulmonary edema. Superimposed infection could be present in the   appropriate clinical context. XR CHEST PORTABLE   Final Result   Mild progressive perihilar opacities which may represent developing pulmonary   edema or progressive bronchopneumonia. Consults:     IP CONSULT TO HOSPITALIST  IP CONSULT TO PULMONOLOGY  IP CONSULT TO CARDIOLOGY    Disposition:  Return to ECF    Condition at Discharge: Stable    Discharge Instructions/Follow-up:  Facility MD 1 week. Code Status:  DNR-CCA     Activity: activity as tolerated    Diet: cardiac diet      Discharge Medications:     Current Discharge Medication List           Details   furosemide (LASIX) 20 MG tablet Take 2 tablets by mouth daily  Qty: 30 tablet, Refills: 0    Associated Diagnoses: Localized swelling of both lower legs; Other hypervolemia              Details   Cholecalciferol (VITAMIN D) 2000 units CAPS capsule Take 2,000 Units by mouth daily      metoprolol succinate (TOPROL XL) 25 MG extended release tablet Take 1 tablet by mouth daily  Qty: 30 tablet, Refills: 3      rivaroxaban (XARELTO) 15 MG TABS tablet Take 1 tablet by mouth daily  Qty: 90 tablet, Refills: 0      potassium chloride (KLOR-CON M) 10 MEQ extended release tablet Take 1 tablet by mouth every other day  Qty: 45 tablet, Refills: 1    Associated Diagnoses: Other hypervolemia;  Localized swelling of both lower legs      folic acid (FOLVITE) 1 MG tablet TAKE 5 TABLETS (TOTAL 5MG) BY MOUTH DAILY  Qty: 150 tablet, Refills: 3      allopurinol (ZYLOPRIM) 100 MG tablet TAKE 0.5 TABLETS BY MOUTH DAILY FOR FOUR WEEKS AND THEN INCREASE TO 1 TABLET DAILY  Qty: 30 tablet, Refills: 2    Associated Diagnoses: Idiopathic chronic gout of multiple sites with tophus      COLCRYS 0.6 MG tablet TAKE ONE TABLET BY MOUTH DAILY  Qty: 30 tablet, Refills: 2    Associated Diagnoses: Idiopathic chronic gout of multiple sites with tophus      pantoprazole (PROTONIX) 40 MG tablet Take 1 tablet by mouth daily  Qty: 30 tablet, Refills: 6      aspirin 81 MG tablet Take 81 mg by mouth every other day       escitalopram (LEXAPRO) 10 MG tablet TAKE ONE TABLET BY MOUTH DAILY  Qty: 30 tablet, Refills: 5    Associated Diagnoses: Dementia due to Alzheimer's disease      Cyanocobalamin 1000 MCG/ML KIT Inject 1,000 mcg weekly for 4 weeks, then monthly afterward  Qty: 4 kit, Refills: 3    Associated Diagnoses: Vitamin B12 deficiency             Time Spent on discharge is more than 30 minutes in the examination, evaluation, counseling and review of medications and discharge plan. Signed:    Africa Alejandra MD   4/23/2019      Thank you Alicia Cardenas MD for the opportunity to be involved in this patient's care. If you have any questions or concerns please feel free to contact me at [phone].

## 2019-04-23 NOTE — PROGRESS NOTES
[facility]   Speech Therapy  Daily Dysphagia Treatment Note    Lanny Dwyer  AGE: 80 y.o. GENDER: female  : 1929  [id number]  EPISODE DATE:  4/15/2019     Patient Active Problem List   Diagnosis    Primary osteoarthritis involving multiple joints    History of breast cancer    Chronic midline low back pain without sciatica    Essential hypertension    Cataract    Lumbar spondylosis    Spinal stenosis, lumbar    Gouty arthropathy    Hypercholesterolemia    CKD (chronic kidney disease) stage 3, GFR 30-59 ml/min ([facility])    Folate deficiency    Vitamin B12 deficiency    Dementia without behavioral disturbance    Esophageal foreign body    Prediabetes    CAP (community acquired pneumonia) due to Chlamydia species    CHF (congestive heart failure), NYHA class I, acute on chronic, combined ([address]    Typical atrial flutter (HCC)    Aspiration pneumonia ([address]    Sepsis ([address]    Acute respiratory failure with hypoxia ([address]    Abnormal radiograph     No Known Allergies  Treatment Diagnosis: Dysphagia     Chart Review:  Lanny Dwyer has a past medical history of Cataract, Chronic midline low back pain without sciatica, Essential hypertension, GERD (gastroesophageal reflux disease), Gouty arthropathy, History of breast cancer, Hypercholesterolemia, Incontinence, Lumbar spondylosis, Osteoporosis of multiple sites, Primary osteoarthritis involving multiple joints, Spinal stenosis, lumbar, and Type 2 diabetes mellitus without complication, without long-term current use of insulin ([address] History of Dysphagia dating back to 2018    MD History and Physical Documentation revealed  Julianna West a 80 y.o. female who presents to the emergency department shortness of breath, hypertension. Patient was discharged [facility] [place] on . She was treated for community-acquired pneumonia, aspiration pneumonia, congestive heart failure atrial fibrillation.  She was discharged to the [facility].     Recent Chest Xray: 4/15/2019      Mild progressive perihilar opacities which may represent developing pulmonary   edema or progressive bronchopneumonia.          Pt is known to SLP Department; recent MBSS completed 4/1/2019 with recommendation for puree with nectar thick liquids 2/2 to Moderate Oropharyngeal Dysphagia characterized by labored mastication; reduced bolus formation and A-P oral transit; delayed swallow , decreased laryngeal elevation and decreased pharyngeal peristalsis. Subjective:   Current diet:  Puree/nectar thickened    Comments regarding tolerating Current Diet:  Family reports pt is consuming >50% of meal without difficulty     Objective:     Pain   Patient Currently in Pain: No    Cognitive/Behavior   Behavior/Cognition: Alert, Cooperative, Confused, Pleasant mood    Presentations   -Puree  -Nectar thick  -Ice chips  -Thin water    Positioning  Upright in bed     Dysphagia Tx:  Dysphagia tx today to assess tolerance of recommended diet and advanced textures. Immediate cough noted with ice chips and reduced control with s/s of premature bolus loss with thin tsp trials. Nectar thick liquids tolerated well via cup. Puree trials revealed intermittent throat clearing possibly due to residuals. Goals:   Dysphagia Goals:   1. The patient will tolerate recommended diet without observed clinical signs of aspiration. Goal met  2. The patient/caregiver will demonstrate understanding of compensatory strategies for improved swallowing safety. --ongoing. 3. The patient will tolerate trials of nectar thick liquids- ongoing: trials of nectar and thin today. Nectars tolerated well; thins revealing immediate cough    Assessment:   Impressions: Pt continues to present with reduced oral strength and coordination, but is willing to take PO. Pt showing s/s of premature bolus loss with immediate cough with thin liquids. Nectar thick liquids and purees tolerated well. Pt with possible discharge today if medically stable. Continued recommendations for speech therapy upon d/c to SNF. Diet Recommendations:  Pureed/Nectar  Recommended Form of Meds: Crushed in puree as able    Strategies:   Upright as possible for all oral intake, Assist feed, Swallow 2 times per bite/sip, Liquid by spoon only, No straws, Total feed, Remain upright for 30-45 minutes after meals(oral care post meals and med pass to ensure all oral pocketing cleared)    Education:  Consulted and agree with results and recommendations: Patient, Family member  Patient Education Response: Verbalizes understanding, No evidence of learning    Prognosis for swallowing:   Good for safe diet tolerance      Plan:     Continue Dysphagia Therapy: Yes    Interventions: Therapeutic Interventions: Diet tolerance monitoring, Patient/Family education  Duration/Frequency of therapy while on unit: Duration/Frequency of Treatment  Duration/Frequency of Treatment: 3-5 times whiel on acute medical floor. Anticipate will need additional therapy at SNF  Discharge Instructions:   Anticipate to be determined for further skilled Speech Therapy for Dysphagia at discharge    This note serves as a D/C Summary in the event that this patient is discharged prior to the next therapy session.     Coded treatment time: 0  Total treatment time: 12 minutes    Fly Alexander MA CCC-SLP #[id number]  Speech Language Pathologist

## 2019-04-23 NOTE — PROGRESS NOTES
Discharge order noted. Patient has received 60 mins of HF education. HF instructions are in place on JOSE. Facility MD will complete required 7day follow up. Follow up also arranged with cardiology as both AF and HF appear to be NEW diagnosis -- Tues Apr 30 at 3:20pm with Ivett Wiley NP. Pharmacy notified of need for discharge med rec. (Asked to specifically review for Ace/Arb/Aldactone rationale).

## 2019-04-23 NOTE — PROGRESS NOTES
Set to  via secure message- PT daughter asking if FSBS can be ordered for the SNF, she is worried about her going back to [facility] and blood glucose not being checked. Awaiting response.  Electronically signed by Kole Davis RN on 4/23/2019 at 12:28 PM

## 2019-04-23 NOTE — DISCHARGE INSTR - COC
with 40 Lomas dil    UPPER GASTROINTESTINAL ENDOSCOPY N/A 12/13/2018    EGD DIAGNOSTIC ONLY performed by Alli Cabrera MD at [facility] ENDOSCOPY       Immunization History:   Immunization History   Administered Date(s) Administered    Pneumococcal 13-valent Conjugate Tobin Baird) 01/26/2017    Tdap (Boostrix, Adacel) 07/14/2014       Active Problems:  Patient Active Problem List   Diagnosis Code    Primary osteoarthritis involving multiple joints M15.0    History of breast cancer Z85.3    Chronic midline low back pain without sciatica M54.5, G89.29    Essential hypertension I10    Cataract H26.9    Lumbar spondylosis M47.816    Spinal stenosis, lumbar M48.061    Gouty arthropathy M10.9    Hypercholesterolemia E78.00    CKD (chronic kidney disease) stage 3, GFR 30-59 ml/min ([facility]) N18.3    Folate deficiency E53.8    Vitamin B12 deficiency E53.8    Dementia without behavioral disturbance F03.90    Esophageal foreign body T18.108A    Prediabetes R73.03    CAP (community acquired pneumonia) due to Chlamydia species J16.0    CHF (congestive heart failure), NYHA class I, acute on chronic, combined ([facility]) I50.43    Typical atrial flutter ([facility]) I48.3    Aspiration pneumonia ([facility]) J69.0    Sepsis ([facility] A41.9    Acute respiratory failure with hypoxia ([facility]) J96.01    Abnormal radiograph R93.89       Isolation/Infection:   Isolation          No Isolation            Nurse Assessment:  Last Vital Signs: /63   Pulse 71   Temp 98.3 °F (36.8 °C)   Resp 16   Ht 5' 2\" (1.575 m)   Wt 123 lb 7.3 oz (56 kg)   SpO2 97%   BMI 22.58 kg/m²     Last documented pain score (0-10 scale): Pain Level: 5  Last Weight:   Wt Readings from Last 1 Encounters:   04/23/19 123 lb 7.3 oz (56 kg)     Mental Status:  disoriented and alert    IV Access:  - None    Nursing Mobility/ADLs:  Walking   Assisted  Transfer  Assisted  Bathing  Assisted  Dressing  Assisted  Toileting  Assisted  Feeding  Assisted  Med Admin Assisted  Med Delivery   crushed and prefers mixed with applesauce    Wound Care Documentation and Therapy:  Wound 06/13/16 Other (Comment) Buttocks Right;Mid; Inner pressure ulcer stage 2 (Active)   Number of days: 1043        Elimination:  Continence:   · Bowel: No  · Bladder: No  Urinary Catheter: None   Colostomy/Ileostomy/Ileal Conduit: No       Date of Last BM: 04/23/19    Intake/Output Summary (Last 24 hours) at 4/23/2019 1301  Last data filed at 4/23/2019 1003  Gross per 24 hour   Intake 480 ml   Output 500 ml   Net -20 ml     I/O last 3 completed shifts: In: 840 [P.O.:840]  Out: 600 [Urine:600]    Safety Concerns: At Risk for Falls    Impairments/Disabilities:      Cognitive    Nutrition Therapy:  Current Nutrition Therapy:   - Oral Diet:  Dysphagia 1 pureed    Routes of Feeding: Oral  Liquids: Nectar Thick Liquids  Daily Fluid Restriction: no  Last Modified Barium Swallow with Video (Video Swallowing Test): not done    Treatments at the Time of Hospital Discharge:   Respiratory Treatments:   Oxygen Therapy:  is not on home oxygen therapy. Ventilator:    - No ventilator support     Heart Failure Instructions:  Patient has systolic heart failure. She will require the following:    Please weigh daily (same scale / same time of day). Please report weight increase of 3 pounds /one day or 5 pounds /one week to [facility] ([phone])    Please use hospital discharge weight as baseline reference. Please monitor for s/sx of worsening HF : sudden weight gain, SOB, LE edema, abdominal distention, inability to lie flat, intolerance to usual activity, cough (especially at night)  Please report these findings even if no increase is noted on scale. Please continue LOW SODIUM diet and LIMIT FLUIDS to 48-64 oz /day    Please call [facility] ([phone]) and / or [email or web address] ([phone]) with any questions or concerns.      Please have facility MD complete required 7 day follow up    Please see AVS for any other hospital follow up appointment details. Rehab Therapies: Physical Therapy and Occupational Therapy  Weight Bearing Status/Restrictions: No weight bearing restirctions  Other Medical Equipment (for information only, NOT a DME order): Other Treatments: Finger stick blood sugar AC&HS    Patient's personal belongings (please select all that are sent with patient):  Clothing    RN SIGNATURE:  Electronically signed by Patrizia Lino RN on 4/23/19 at 1:15 PM    CASE MANAGEMENT/SOCIAL WORK SECTION    Inpatient Status Date: 4/23/2019    Readmission Risk Assessment Score:  Readmission Risk              Risk of Unplanned Readmission:        22           Discharging to Facility/ Agency   · Name: [facility]  · [address] [address]  · [id number]  · KST:[phone]        / signature:Electronically signed by Therese Martinez on 4/23/2019 at 1:46 PM      PHYSICIAN SECTION    Prognosis: Guarded    Condition at Discharge: Stable    Rehab Potential (if transferring to Rehab): Guarded    Recommended Labs or Other Treatments After Discharge: resume ECF      Physician Certification: I certify the above information and transfer of Karlene Arguello  is necessary for the continuing treatment of the diagnosis listed and that she requires [facility] for greater 30 days.      Update Admission H&P: No change in H&P    PHYSICIAN SIGNATURE:  Electronically signed by Alex Marr MD on 4/23/19 at 1:52 PM

## 2019-04-23 NOTE — PLAN OF CARE
Problem: Falls - Risk of:  Goal: Will remain free from falls  Description  Will remain free from falls  4/23/2019 0040 by Annabelle Alcaraz RN  Outcome: Ongoing  Note:   Fall risk precautions in place. Bed in lowest position with wheels locked,bed alarm in place and activated,non-skid socks on pt, fall risk ID on pt, call light in reach, will continue to monitor. Problem: Risk for Impaired Skin Integrity  Goal: Tissue integrity - skin and mucous membranes  Description  Structural intactness and normal physiological function of skin and  mucous membranes. 4/23/2019 0040 by Annabelle Alcaraz RN  Outcome: Ongoing  Note:   Skin assessment completed every shift. Pt assessed for incontinence, appropriate barrier cream applied prn. Pt encouraged to turn/rotate every 2 hours. Assistance provided if pt unable to do so themselves. Problem: OXYGENATION/RESPIRATORY FUNCTION  Goal: Patient will achieve/maintain normal respiratory rate/effort  Description  Respiratory rate and effort will be within normal limits for the patient  4/23/2019 0040 by Annabelle Alcaraz RN  Outcome: Ongoing  Note:   Pt satting >90% on RA. Clear/diminished lung sounds noted bilat     Problem: HEMODYNAMIC STATUS  Goal: Patient has stable vital signs and fluid balance  4/23/2019 0040 by Annabelle Alcaraz RN  Outcome: Ongoing  Note:   VSS. All peripheral pulses palpable. Vitals obtained and monitored per RN q4h     Problem: FLUID AND ELECTROLYTE IMBALANCE  Goal: Fluid and electrolyte balance are achieved/maintained  4/23/2019 0040 by Annabelle Alcaraz RN  Outcome: Ongoing  Note:   Output monitored by external catheter, low output noted.   Pt encouraged to drink fluids

## 2019-04-23 NOTE — PROGRESS NOTES
Pt resting in bed w/eyes closed, no facial grimace. Respirations easy/even. Bed in lowest position, wheels locked, bed exit alarm in place, call light within reach, and non skid footwear on. Walkway free of clutter. Pt alert and oriented and able to make needs known. Pt educated to use call light when needing to get up, and pt utilizes call light to make needs known. Will continue to monitor.  Electronically signed by Jeyson Garland RN on 4/23/2019 at 8:12 AM

## 2019-04-23 NOTE — PLAN OF CARE
Problem: Falls - Risk of:  Goal: Will remain free from falls  Description  Will remain free from falls  4/23/2019 0813 by Elsa Viera RN  Outcome: Ongoing  Note:   Fall risk assessment completed. Fall precautions in place. Call light within reach. Pt educated on calling for assistance before getting up. Walkway free of clutter. Patient bed alarm intact, encouraged patient to call for assistance especially if feeling dizzy, SOB, or weakness. Will continue to monitor. Electronically signed by Elsa Viera RN on 4/23/2019 at 8:13 AM      4/23/2019 0040 by Gopal Mojica RN  Outcome: Ongoing  Note:   Fall risk precautions in place. Bed in lowest position with wheels locked,bed alarm in place and activated,non-skid socks on pt, fall risk ID on pt, call light in reach, will continue to monitor. Goal: Absence of physical injury  Description  Absence of physical injury  Outcome: Ongoing     Problem: Risk for Impaired Skin Integrity  Goal: Tissue integrity - skin and mucous membranes  Description  Structural intactness and normal physiological function of skin and  mucous membranes. 4/23/2019 0813 by Elsa Viera RN  Outcome: Ongoing  Note:   Patient assessed for skin break down. Skin was warm and dry to touch. Pt able to turn self and regulate head of bed without assistance. Patient reminded to turn or reposition at least every 2 hours to prevent skin breakdown. Patient moves BLEs independently. Will continue to monitor and assess. 4/23/2019 0040 by Gopal Mojica RN  Outcome: Ongoing  Note:   Skin assessment completed every shift. Pt assessed for incontinence, appropriate barrier cream applied prn. Pt encouraged to turn/rotate every 2 hours. Assistance provided if pt unable to do so themselves.          Problem: OXYGENATION/RESPIRATORY FUNCTION  Goal: Patient will maintain patent airway  Outcome: Ongoing  Note:   Patient has maintained a patent airway, repositions self, lung sounds bilaterally diminished, cough noted and is non-productive, RT is involved in patient care and is providing care, patient has had adequate fluid intake and is on a 1500 cc fluid restriction, educated patient to turn and cough and deep breathe every two hours and as needed, encouraged incentive spirometer and patient has been performing. Will continue to monitor and reassess. Electronically signed by Chiara Lemus RN on 4/23/2019 at 8:14 AM      Goal: Patient will achieve/maintain normal respiratory rate/effort  Description  Respiratory rate and effort will be within normal limits for the patient  4/23/2019 0813 by Chiara Lemus RN  Outcome: Ongoing  4/23/2019 0040 by Aracely Young RN  Outcome: Ongoing  Note:   Pt satting >90% on RA. Clear/diminished lung sounds noted bilat     Problem: HEMODYNAMIC STATUS  Goal: Patient has stable vital signs and fluid balance  4/23/2019 0813 by Chiara Lemus RN  Outcome: Ongoing  Note:   Vital signs stable, respiratory rate normal, heart rate is WNLs and regular on cardiac monitor, +2 pulses and less than 3 second cap refill noted in all extremities, body color appropriate for ethnicity and temperature warm, edema noted to BLE, patient repositions  self, and daily weights are ordered. Will continue to monitor and reassess. Electronically signed by Chiara Lemus RN on 4/23/2019 at 8:14 AM      4/23/2019 0040 by Aracely Young RN  Outcome: Ongoing  Note:   VSS. All peripheral pulses palpable. Vitals obtained and monitored per RN q4h     Problem: FLUID AND ELECTROLYTE IMBALANCE  Goal: Fluid and electrolyte balance are achieved/maintained  4/23/2019 0813 by Chiara Lemus RN  Outcome: Ongoing  Note:   Educated to drink fluids within fluid restriction guidelines and to adequately hydrate, medications administered as ordered, abnormal lab values assessed and reported to physician if critical or pertinent to patient status, skin turgor, mucus membranes, and respiratory status assess this shift.  Patient and Family was included in plan of care. Will continue to monitor and reassess. Electronically signed by Maria A Rogers RN on 4/23/2019 at 8:14 AM      4/23/2019 0040 by Naresh Henson RN  Outcome: Ongoing  Note:   Output monitored by external catheter, low output noted. Pt encouraged to drink fluids     Problem: ACTIVITY INTOLERANCE/IMPAIRED MOBILITY  Goal: Mobility/activity is maintained at optimum level for patient  4/23/2019 0813 by Maria A Rogers RN  Outcome: Ongoing  Note:   Patient X2 w/stedy or maximove, turns in bed with assistance. Educated to call for assistance when needing to reposition or get out of bed, patient verbalized understanding. Will continue to monitor and reassess. Electronically signed by Maria A Rogers RN on 4/23/2019 at 8:15 AM      4/23/2019 0040 by Naresh Henson RN  Outcome: Ongoing  Note:   Pt dependent on nurse for care unablet o get up unless by stedy x2     Problem: Pain:  Goal: Pain level will decrease  Description  Pain level will decrease  Outcome: Ongoing  Note:   Pt assessed for pain. Pt in 0/10 pain and assessed with FLACC pain rating scale. Pt declined prescribed analgesic for pain, states pain is tolerable. (See eMar) Pt satisfied with pain relief thus far. Will reassess and continue to monitor. Electronically signed by Maria A Rogers RN on 4/23/2019 at 8:15 AM      Goal: Control of acute pain  Description  Control of acute pain  4/23/2019 0813 by Maria A Rogers RN  Outcome: Ongoing  4/23/2019 0040 by Naresh Henson RN  Outcome: Ongoing  Note:   Pain/discomfort being managed with PRN analgesics per MD orders. Pt able to express presence and absence of pain and rate pain appropriately using numerical scale.     Goal: Control of chronic pain  Description  Control of chronic pain  Outcome: Ongoing     Problem: Nutrition  Goal: Optimal nutrition therapy  Outcome: Ongoing

## 2019-04-23 NOTE — PROGRESS NOTES
Per MD order, cancel DC place GI consult.  Electronically signed by Radha Vera, RN on 4/23/2019 at 7:04 PM

## 2019-04-23 NOTE — PROGRESS NOTES
Report called to Ellie at [facility]. Receiving nurse denies any questions/concerns.  Electronically signed by Kelly Artis RN on 4/23/2019 at 4:50 PM

## 2019-04-24 ENCOUNTER — APPOINTMENT (OUTPATIENT)
Dept: CT IMAGING | Age: 84
DRG: 871 | End: 2019-04-24
Payer: MEDICARE

## 2019-04-24 LAB
ALBUMIN SERPL-MCNC: 2.2 G/DL (ref 3.4–5)
ANION GAP SERPL CALCULATED.3IONS-SCNC: 14 MMOL/L (ref 3–16)
BASOPHILS ABSOLUTE: 0 K/UL (ref 0–0.2)
BASOPHILS RELATIVE PERCENT: 0.2 %
BUN BLDV-MCNC: 51 MG/DL (ref 7–20)
CALCIUM SERPL-MCNC: 8.9 MG/DL (ref 8.3–10.6)
CHLORIDE BLD-SCNC: 102 MMOL/L (ref 99–110)
CO2: 26 MMOL/L (ref 21–32)
CREAT SERPL-MCNC: 1.4 MG/DL (ref 0.6–1.2)
EOSINOPHILS ABSOLUTE: 0 K/UL (ref 0–0.6)
EOSINOPHILS RELATIVE PERCENT: 0.1 %
GFR AFRICAN AMERICAN: 43
GFR NON-AFRICAN AMERICAN: 35
GLUCOSE BLD-MCNC: 227 MG/DL (ref 70–99)
GLUCOSE BLD-MCNC: 233 MG/DL (ref 70–99)
GLUCOSE BLD-MCNC: 248 MG/DL (ref 70–99)
GLUCOSE BLD-MCNC: 251 MG/DL (ref 70–99)
HCT VFR BLD CALC: 39 % (ref 36–48)
HEMOGLOBIN: 12.2 G/DL (ref 12–16)
LACTIC ACID: 3.4 MMOL/L (ref 0.4–2)
LACTIC ACID: 4.1 MMOL/L (ref 0.4–2)
LYMPHOCYTES ABSOLUTE: 0.4 K/UL (ref 1–5.1)
LYMPHOCYTES RELATIVE PERCENT: 2.1 %
MCH RBC QN AUTO: 26.7 PG (ref 26–34)
MCHC RBC AUTO-ENTMCNC: 31.3 G/DL (ref 31–36)
MCV RBC AUTO: 85.4 FL (ref 80–100)
MONOCYTES ABSOLUTE: 0.9 K/UL (ref 0–1.3)
MONOCYTES RELATIVE PERCENT: 4.4 %
NEUTROPHILS ABSOLUTE: 18.9 K/UL (ref 1.7–7.7)
NEUTROPHILS RELATIVE PERCENT: 93.2 %
PDW BLD-RTO: 20.7 % (ref 12.4–15.4)
PERFORMED ON: ABNORMAL
PHOSPHORUS: 2 MG/DL (ref 2.5–4.9)
PLATELET # BLD: 223 K/UL (ref 135–450)
PMV BLD AUTO: 10.4 FL (ref 5–10.5)
POTASSIUM SERPL-SCNC: 4.5 MMOL/L (ref 3.5–5.1)
RBC # BLD: 4.56 M/UL (ref 4–5.2)
SODIUM BLD-SCNC: 142 MMOL/L (ref 136–145)
WBC # BLD: 20.3 K/UL (ref 4–11)

## 2019-04-24 PROCEDURE — 85025 COMPLETE CBC W/AUTO DIFF WBC: CPT

## 2019-04-24 PROCEDURE — C9113 INJ PANTOPRAZOLE SODIUM, VIA: HCPCS | Performed by: INTERNAL MEDICINE

## 2019-04-24 PROCEDURE — 6360000002 HC RX W HCPCS: Performed by: INTERNAL MEDICINE

## 2019-04-24 PROCEDURE — 6370000000 HC RX 637 (ALT 250 FOR IP): Performed by: INTERNAL MEDICINE

## 2019-04-24 PROCEDURE — 87641 MR-STAPH DNA AMP PROBE: CPT

## 2019-04-24 PROCEDURE — 99223 1ST HOSP IP/OBS HIGH 75: CPT | Performed by: INTERNAL MEDICINE

## 2019-04-24 PROCEDURE — 2580000003 HC RX 258: Performed by: INTERNAL MEDICINE

## 2019-04-24 PROCEDURE — 80069 RENAL FUNCTION PANEL: CPT

## 2019-04-24 PROCEDURE — 97530 THERAPEUTIC ACTIVITIES: CPT

## 2019-04-24 PROCEDURE — 3609012800 HC EGD DIAGNOSTIC ONLY: Performed by: INTERNAL MEDICINE

## 2019-04-24 PROCEDURE — 7100000010 HC PHASE II RECOVERY - FIRST 15 MIN: Performed by: INTERNAL MEDICINE

## 2019-04-24 PROCEDURE — 0DJ08ZZ INSPECTION OF UPPER INTESTINAL TRACT, VIA NATURAL OR ARTIFICIAL OPENING ENDOSCOPIC: ICD-10-PCS | Performed by: INTERNAL MEDICINE

## 2019-04-24 PROCEDURE — 7100000011 HC PHASE II RECOVERY - ADDTL 15 MIN: Performed by: INTERNAL MEDICINE

## 2019-04-24 PROCEDURE — 2500000003 HC RX 250 WO HCPCS: Performed by: INTERNAL MEDICINE

## 2019-04-24 PROCEDURE — 2709999900 HC NON-CHARGEABLE SUPPLY: Performed by: INTERNAL MEDICINE

## 2019-04-24 PROCEDURE — 83605 ASSAY OF LACTIC ACID: CPT

## 2019-04-24 PROCEDURE — 94760 N-INVAS EAR/PLS OXIMETRY 1: CPT

## 2019-04-24 PROCEDURE — 36415 COLL VENOUS BLD VENIPUNCTURE: CPT

## 2019-04-24 PROCEDURE — 87040 BLOOD CULTURE FOR BACTERIA: CPT

## 2019-04-24 PROCEDURE — 99152 MOD SED SAME PHYS/QHP 5/>YRS: CPT | Performed by: INTERNAL MEDICINE

## 2019-04-24 PROCEDURE — 71250 CT THORAX DX C-: CPT

## 2019-04-24 PROCEDURE — 2060000000 HC ICU INTERMEDIATE R&B

## 2019-04-24 RX ORDER — METOPROLOL TARTRATE 5 MG/5ML
5 INJECTION INTRAVENOUS EVERY 6 HOURS
Status: DISCONTINUED | OUTPATIENT
Start: 2019-04-24 | End: 2019-04-26

## 2019-04-24 RX ORDER — 0.9 % SODIUM CHLORIDE 0.9 %
500 INTRAVENOUS SOLUTION INTRAVENOUS ONCE
Status: COMPLETED | OUTPATIENT
Start: 2019-04-24 | End: 2019-04-24

## 2019-04-24 RX ORDER — AMOXICILLIN AND CLAVULANATE POTASSIUM 875; 125 MG/1; MG/1
1 TABLET, FILM COATED ORAL EVERY 12 HOURS SCHEDULED
Status: DISCONTINUED | OUTPATIENT
Start: 2019-04-24 | End: 2019-04-24

## 2019-04-24 RX ORDER — PANTOPRAZOLE SODIUM 40 MG/10ML
40 INJECTION, POWDER, LYOPHILIZED, FOR SOLUTION INTRAVENOUS DAILY
Status: DISCONTINUED | OUTPATIENT
Start: 2019-04-24 | End: 2019-04-26 | Stop reason: HOSPADM

## 2019-04-24 RX ORDER — 0.9 % SODIUM CHLORIDE 0.9 %
10 VIAL (ML) INJECTION DAILY
Status: DISCONTINUED | OUTPATIENT
Start: 2019-04-24 | End: 2019-04-26 | Stop reason: HOSPADM

## 2019-04-24 RX ORDER — MIDAZOLAM HYDROCHLORIDE 1 MG/ML
INJECTION INTRAMUSCULAR; INTRAVENOUS
Status: COMPLETED | OUTPATIENT
Start: 2019-04-24 | End: 2019-04-24

## 2019-04-24 RX ADMIN — SODIUM CHLORIDE 8 MG/HR: 9 INJECTION, SOLUTION INTRAVENOUS at 10:31

## 2019-04-24 RX ADMIN — Medication 10 ML: at 17:55

## 2019-04-24 RX ADMIN — VANCOMYCIN HYDROCHLORIDE 1000 MG: 1 INJECTION, POWDER, LYOPHILIZED, FOR SOLUTION INTRAVENOUS at 18:01

## 2019-04-24 RX ADMIN — PANTOPRAZOLE SODIUM 40 MG: 40 INJECTION, POWDER, FOR SOLUTION INTRAVENOUS at 17:54

## 2019-04-24 RX ADMIN — METOPROLOL TARTRATE 5 MG: 5 INJECTION INTRAVENOUS at 10:30

## 2019-04-24 RX ADMIN — SODIUM CHLORIDE: 9 INJECTION, SOLUTION INTRAVENOUS at 19:05

## 2019-04-24 RX ADMIN — INSULIN LISPRO 2 UNITS: 100 INJECTION, SOLUTION INTRAVENOUS; SUBCUTANEOUS at 17:57

## 2019-04-24 RX ADMIN — SODIUM CHLORIDE 500 ML: 9 INJECTION, SOLUTION INTRAVENOUS at 17:54

## 2019-04-24 RX ADMIN — INSULIN LISPRO 4 UNITS: 100 INJECTION, SOLUTION INTRAVENOUS; SUBCUTANEOUS at 22:21

## 2019-04-24 RX ADMIN — METOPROLOL TARTRATE 5 MG: 5 INJECTION INTRAVENOUS at 22:21

## 2019-04-24 RX ADMIN — CEFEPIME HYDROCHLORIDE 2 G: 2 INJECTION, POWDER, FOR SOLUTION INTRAVENOUS at 19:42

## 2019-04-24 ASSESSMENT — PAIN SCALES - GENERAL
PAINLEVEL_OUTOF10: 0

## 2019-04-24 ASSESSMENT — PAIN SCALES - WONG BAKER
WONGBAKER_NUMERICALRESPONSE: 0

## 2019-04-24 NOTE — PROGRESS NOTES
04/23/19  0550 04/24/19  0541    142 142   K 3.3* 3.7 4.5   CL 98* 100 102   CO2 31 33* 26   BUN 27* 27* 51*   CREATININE 1.1 1.1 1.4*   CALCIUM 8.6 8.8 8.9   PHOS 2.2* 2.2* 2.0*     No results for input(s): AST, ALT, BILIDIR, BILITOT, ALKPHOS in the last 72 hours. Recent Labs     04/23/19  2110   INR 1.96*     No results for input(s): Rossy Tonny in the last 72 hours. Urinalysis:      Lab Results   Component Value Date    NITRU Negative 04/16/2019    WBCUA 17 04/16/2019    BACTERIA 4+ 03/29/2019    RBCUA 54 04/16/2019    BLOODU MODERATE 04/16/2019    SPECGRAV 1.008 04/16/2019    GLUCOSEU Negative 04/16/2019       Radiology:  XR CHEST PORTABLE   Final Result   Low lung volume study showing prominence of the interstitial markings,   increased since 04/20/2019. This finding may be at least partially due to   low lung volumes. Correlation for interstitial edema is recommended. Mild left basilar airspace disease, atelectasis, asymmetric edema or   pneumonia. XR CHEST STANDARD (2 VW)   Final Result   Mild pulmonary vascular congestion remains but is significantly improved from   the prior study. No sizable pleural effusion appreciated. XR CHEST PORTABLE   Final Result   Features of heart failure, including bilateral pleural effusions and moderate   to severe pulmonary edema. Superimposed infection could be present in the   appropriate clinical context. XR CHEST PORTABLE   Final Result   Mild progressive perihilar opacities which may represent developing pulmonary   edema or progressive bronchopneumonia. Assessment/Plan:    Active Hospital Problems    Diagnosis Date Noted    Sepsis [facility]) [A41.9] 04/15/2019    Acute respiratory failure with hypoxia (HCC) [J96.01]     Abnormal radiograph [R93.89]        Sepsis-present on admission - resolved   Possible due to pneumonia,   FIlm array with coronavirus.        Hemoptysis -   Suspected upper GI but EGD was

## 2019-04-24 NOTE — OP NOTE
Endoscopy Note    Patient: Irasema Brooke  : 1929  Acct#:     Procedure: Esophagogastroduodenoscopy                         Date:  2019     Surgeon:   Terence Emmanuel MD    Referring Physician:  Johanna Blevins MD    Indications: This is a 80y.o. year old female who presents today with ? Hematemesis vs Hemotysis    Postoperative Diagnosis: 1. Mild non-obstructing Schatzki's Ring 2. Large Hiatal Hernia     Anesthesia:  Versed 1 mg IV Sedation Time: 12:22-12:27     Consent:  The patient or their legal guardian has signed an informed consent, and is aware of the potential risks, benefits, alternatives, and potential complications of this procedure. These include, but are not limited to hemorrhage, bleeding, post procedural pain, perforation, phlebitis, aspiration, hypotension, hypoxia, cardiovascular events such as arryhthmia, and possibly death. Description of Procedure: The patient was then taken to the endoscopy suite, placed in the left lateral decubitus position and the above IV sedation was administrered. The Olympus video endoscope was placed through the patient's oropharynx without difficulty to the extent of the 2nd portion of the duodenum. Both forward and retroflexed views of the stomach were obtained. Findings:    Esophagus: The esophagus had a mild non-obstructing Schatzki's ring in the distal esophagus. The esophagus otherwise appeared normal without evidence of Lizarraga's esophagus or reflux esophagitis. Stomach: The stomach had evidence of a large 4-5 cm hiatal hernia. The hernia sac was closely examined without any evidence of Sidney's ulceration. The stomach otherwise appeared normal on forward and retroflexed views. Duodenum: The first and 2nd portions of the duodenum appeared normal with normal villous pattern    The scope was then withdrawn back into the stomach, it was decompressed, and the scope was completely withdrawn.      The patient tolerated the procedure well and was taken to the post anesthesia care unit in good condition. Impression:   1) See post procedure diagnoses    Recommendations:    1) Clear liquid diet. Advance diet as tolerated  2)  No GI source of bleeding identified. Blood was present in the oropharynx with concern for blood coming from the nose vs lungs. Defer to primary team if they want to pursue additional work-up.  Will sign off.     GAY [address] [facility]

## 2019-04-24 NOTE — PROGRESS NOTES
Occupational Therapy  Facility/Department: [address] PROGRESSIVE CARE  Daily Treatment Note  NAME: Karlene Arguello  : 1929  MRN: [id number]    Date of Service: 2019    Discharge Recommendations:  Patient would benefit from continued therapy after discharge, 3-5 sessions per week    Karlene Arguello scored a 10/ on the AM-PAC ADL Inpatient form. Current research shows that an AM-PAC score of 17 or less is typically not associated with a discharge to the patient's home setting. Based on the patients AM-PAC score and their current ADL deficits, it is recommended that the patient have 3-5 sessions per week of Occupational Therapy at d/c to increase the patients independence. Assessment   Performance deficits / Impairments: Decreased functional mobility ; Decreased high-level IADLs;Decreased ADL status; Decreased endurance;Decreased cognition;Decreased strength;Decreased balance;Decreased safe awareness  Assessment: Discussed with OTR: PT tolerated session fair today. Pt required up to Max A x2 for bed mob, sit><Stands and standing at EOB. Pt will benefit from cont OT at d/c to maximize function and decrease caregiver burden. Cont POC. Prognosis: Fair  Patient Education: Role of OT-pt with limited understanding-  [place] and hx of dementia  REQUIRES OT FOLLOW UP: Yes  Activity Tolerance  Activity Tolerance: Treatment limited secondary to decreased cognition;Patient limited by fatigue  Safety Devices  Safety Devices in place: Yes(RNBriseida)  Type of devices: Left in bed;Bed alarm in place;Call light within reach;Gait belt;Nurse notified         Patient Diagnosis(es): The primary encounter diagnosis was HCAP (healthcare-associated pneumonia).  Diagnoses of Sepsis, due to unspecified organism ([facility] [address] Lactic acidosis, Chronic kidney disease, unspecified CKD stage, Type 2 diabetes mellitus with hyperglycemia, unspecified whether long term insulin use ([facility] [address] Congestive heart failure, unspecified HF chronicity, unspecified heart failure type ([address] Paroxysmal atrial fibrillation ([address] Atrial flutter, paroxysmal ([address] Localized swelling of both lower legs, and Other hypervolemia were also pertinent to this visit. has a past medical history of Cataract, Chronic midline low back pain without sciatica, Esophageal dilatation, Esophageal stricture, Essential hypertension, GERD (gastroesophageal reflux disease), Gouty arthropathy, History of breast cancer, Hypercholesterolemia, Incontinence, Lumbar spondylosis, Osteoporosis of multiple sites, Primary osteoarthritis involving multiple joints, Spinal stenosis, lumbar, and Type 2 diabetes mellitus without complication, without long-term current use of insulin ([address] has a past surgical history that includes Mastectomy; Cataract removal (jan 2012); Cataract removal (Nov. 2012); Cholecystectomy; Upper gastrointestinal endoscopy; Upper gastrointestinal endoscopy (N/A, 12/6/2018); Esophagus dilation (N/A, 12/6/2018); Upper gastrointestinal endoscopy (12/13/2018); Upper gastrointestinal endoscopy (N/A, 12/13/2018); and Esophagus dilation (N/A, 12/13/2018). Restrictions  Restrictions/Precautions  Restrictions/Precautions: Fall Risk  Position Activity Restriction  Other position/activity restrictions: diet: dysphagia I pureed/ honey thick  Subjective   General  Chart Reviewed: Yes  Patient assessed for rehabilitation services?: Yes  Family / Caregiver Present: Yes(spouse and dgt)  Diagnosis: HCAP, sepsis, hyperglycemia  Subjective  Subjective: Pt met BS, in bed. Pt alert, speech unintelligible today. Pt did not appear in pain, except with bed mob-no number given. General Comment  Comments: Pt seen with PT for co-tx. Discharge cancelled d/t pt requiring another procedure      Orientation  Orientation  Overall Orientation Status: Impaired  Orientation Level: Oriented to person;Disoriented to time;Disoriented to place; Disoriented to situation  Objective ADL  Toileting: Dependent/Total(incontinent of loose BM. )        Balance  Sitting Balance: Minimal assistance(posterior lean today, at EOB)  Standing Balance  Time: less tahn 5-7 sec   Activity: brief stance at EOB Max A x2; when incontinent of BM in stance, returned to bed for cleaning  Sit to stand: 2 Person assistance(Max A x2)  Comment: sit><stands with Max A x2  Bed mobility  Rolling to Left: Maximum assistance(x1)  Rolling to Right: Maximum assistance(x1)  Supine to Sit: Moderate assistance;Maximum assistance;2 Person assistance  Sit to Supine: Moderate assistance;2 Person assistance;Maximum assistance  Scooting: Maximal assistance;2 Person assistance;Dependent/Total(to EOB and up in bed)            Cognition  Overall Cognitive Status: Exceptions  Arousal/Alertness: Delayed responses to stimuli  Following Commands: Inconsistently follows commands  Attention Span: Difficulty attending to directions  Problem Solving: Assistance required to generate solutions;Assistance required to implement solutions  Initiation: Requires cues for some  Sequencing: Requires cues for some  Cognition Comment: [place]; hx Alz Dementia            Plan   Plan  Times per week: 3-5x  Current Treatment Recommendations: Functional Mobility Training, Equipment Evaluation, Education, & procurement, Patient/Caregiver Education & Training, Self-Care / ADL, Balance Training, Strengthening    AM-PAC Score        AM-[facility] Inpatient Daily Activity Raw Score: 10  AM-PAC Inpatient ADL T-Scale Score : 27.31  ADL Inpatient CMS 0-100% Score: 74.7  ADL Inpatient CMS G-Code Modifier : CL    Goals  Short term goals  Time Frame for Short term goals: Status:  Short term goal 1: Mod A for grooming  Short term goal 2: Max A for bathing and dressing  Short term goal 3: Mod A for bed mobility  Short term goal 4:  Max A for pivot transfers  Patient Goals   Patient goals : pt unable to make goal       Therapy Time   Individual Concurrent Group Co-treatment   Time In 1031         Time Out 1100         Minutes [address] [facility] TURCIOS/L,[id number]  This note to serve as discharge summary if pt d/c'd prior to next session

## 2019-04-24 NOTE — PROGRESS NOTES
Speech Language Pathology        Attempted to f/u with pt. Pt is at endoscopy for EGD. Speech will f/u 4/25/2019.     Bertha Atkinson MS CCC/SLP [id number]  04/24/19  12:48 PM

## 2019-04-24 NOTE — CONSULTS
REASON FOR CONSULTATION/CC: hemoptysis      Consult at request of Maria L Prajapati MD     PCP: Zahraa Willingham MD  Established Pulmonologist:  None    Chief Complaint   Patient presents with    Shortness of Breath       HISTORY OF PRESENT ILLNESS: Brayan Robles is a 80y.o. year old female with a history of afib, ?stroke, on xarelto who is admitted for HCAP and has completed 7 days of IV abx. Initially presented with sepsis and following abx improved. However, last night pt developed acute onset what was thought to be either hemoptysis or hematemesis. She has never had this before. Total blood loss difficult to estimate, but Had EGD performed today without obvious source of bleeding. Pt family at bedside states that her coughing had actually been improving since tx of HCAP. No known epistaxis. Pt is sleepy from sedation following her recent procedure and does not answer questions.       Past Medical History:   Diagnosis Date    Cataract     Chronic midline low back pain without sciatica     Esophageal dilatation 12/12/2018    PERFOMRD BY  DR. Vivian Cintron  ([facility])  IN THE ED    Esophageal stricture 12/2018    Essential hypertension     GERD (gastroesophageal reflux disease)     Gouty arthropathy     History of breast cancer     Hypercholesterolemia     Incontinence     Lumbar spondylosis     Osteoporosis of multiple sites     Primary osteoarthritis involving multiple joints     Spinal stenosis, lumbar     Type 2 diabetes mellitus without complication, without long-term current use of insulin [facility])          Past Surgical History:   Procedure Laterality Date    CATARACT REMOVAL  jan 2012    dr Miles Morales left    CATARACT REMOVAL  Nov. 2012    dr Miles Morales  right    CHOLECYSTECTOMY      ESOPHAGEAL DILATATION N/A 12/6/2018    ESOPHAGEAL DILATION Tami Jurado performed by Sirena Reece MD at [address] N/A 12/13/2018    ESOPHAGEAL DILATION Tami Jurado performed by Baron Patricia Melanie Mckeon MD at [address] [facility] ENDOSCOPY      UPPER GASTROINTESTINAL ENDOSCOPY N/A 12/6/2018    EGD ESOPHAGOGASTRODUODENOSCOPY performed by Tonia Trujillo MD at [facility]  12/13/2018    with [address] dil    UPPER GASTROINTESTINAL ENDOSCOPY N/A 12/13/2018    EGD DIAGNOSTIC ONLY performed by Tonia Trujillo MD at [facility] N/A 4/24/2019    EGD DIAGNOSTIC ONLY performed by Delane Collet, MD at [address] [facility] Hx  family history is not on file. Unable to obtain due to altered mental status  Social Hx   reports that she has never smoked. She has never used smokeless tobacco.    Scheduled Meds:   insulin lispro  0-12 Units Subcutaneous Q4H    metoprolol  5 mg Intravenous Q6H    allopurinol  100 mg Oral Daily    colchicine  0.6 mg Oral Daily    escitalopram  10 mg Oral Daily    sodium chloride flush  10 mL Intravenous 2 times per day       Continuous Infusions:   sodium chloride 50 mL/hr at 04/23/19 2202    pantoprozole (PROTONIX) infusion 8 mg/hr (04/24/19 1031)    dextrose         PRN Meds:  glucose, dextrose, glucagon (rDNA), dextrose, sodium chloride flush, perflutren lipid microspheres, perflutren lipid microspheres    ALLERGIES:  Patient has No Known Allergies. REVIEW OF SYSTEMS:  Unable to obtain due to altered mental status      Objective:   PHYSICAL EXAM:  Blood pressure (!) 126/56, pulse 81, temperature 96.8 °F (36 °C), temperature source Temporal, resp. rate 14, height 5' 2\" (1.575 m), weight 123 lb 14.4 oz (56.2 kg), SpO2 95 %.'  Gen:  No acute distress. sleeping following recent EG D  Eyes: PERRL. Anicteric sclera. No conjunctival injection. ENT: No discharge. Posterior oropharynx clear. External appearance of ears and nose normal.  Neck: Trachea midline. No mass   Resp: B/l crackles. No wheezes. No rhonchi.  No dullness on percussion. CV: Regular rate. Regular rhythm. No murmur or rub. No edema. GI: Soft, Non-tender. Non-distended. +BS  Skin: Warm, dry, w/o erythema. Lymph: No cervical or supraclavicular LAD. M/S: No cyanosis. No clubbing. Neuro:  no focal neurologic deficit. Moves all extremities  Psych: Sleeping post procedure. Data Reviewed:   LABS:  CBC:   Recent Labs     04/23/19 2110 04/24/19  0541   WBC 12.5* 20.3*   HGB 12.5 12.2   HCT 40.0 39.0   MCV 84.2 85.4    223     BMP:   Recent Labs     04/22/19  0646 04/23/19  0550 04/24/19  0541    142 142   K 3.3* 3.7 4.5   CL 98* 100 102   CO2 31 33* 26   PHOS 2.2* 2.2* 2.0*   BUN 27* 27* 51*   CREATININE 1.1 1.1 1.4*     LIVER PROFILE: No results for input(s): AST, ALT, LIPASE, BILIDIR, BILITOT, ALKPHOS in the last 72 hours. Invalid input(s): AMYLASE,  ALB  PT/INR:   Recent Labs     04/23/19 2110   PROTIME 22.3*   INR 1.96*     APTT: No results for input(s): APTT in the last 72 hours. UA:No results for input(s): NITRITE, COLORU, PHUR, LABCAST, WBCUA, RBCUA, MUCUS, TRICHOMONAS, YEAST, BACTERIA, CLARITYU, SPECGRAV, LEUKOCYTESUR, UROBILINOGEN, BILIRUBINUR, BLOODU, GLUCOSEU, AMORPHOUS in the last 72 hours. Invalid input(s): KETONESU  No results for input(s): PHART, VIC5RVR, PO2ART in the last 72 hours. Radiology Review:  Pertinent images / reports were reviewed as a part of this visit. CT Chest w/ contrast: No results found for this or any previous visit. CT Chest w/o contrast:   Results for orders placed during the hospital encounter of 04/15/19   CT CHEST WO CONTRAST    Narrative EXAMINATION:  CT OF THE CHEST WITHOUT CONTRAST 4/24/2019 2:01 pm    TECHNIQUE:  CT of the chest was performed without the administration of intravenous  contrast. Multiplanar reformatted images are provided for review.  Dose  modulation, iterative reconstruction, and/or weight based adjustment of the  mA/kV was utilized to reduce the radiation dose to as prominent than on the prior study. Right  pleural effusion previously present not appreciated. No pneumonia  identified. Left subclavian port and neurostimulator wires are present. Impression Mild pulmonary vascular congestion remains but is significantly improved from  the prior study. No sizable pleural effusion appreciated. CXR portable:   Results for orders placed during the hospital encounter of 04/15/19   XR CHEST PORTABLE    Narrative EXAMINATION:  SINGLE XRAY VIEW OF THE CHEST    4/23/2019 8:26 pm    COMPARISON:  Multiple prior studies including most recent chest x-ray 04/20/2019, CT chest  03/29/2019    HISTORY:  ORDERING SYSTEM PROVIDED HISTORY: hemoptysis. TECHNOLOGIST PROVIDED HISTORY:  Reason for exam:->hemoptysis. Ordering Physician Provided Reason for Exam: hemoptysis  Acuity: Acute  Type of Exam: Initial    FINDINGS:  A left subclavian central venous catheter is not changed. Also again noted  are radiopaque leads projecting over the lower spinal canal of the thoracic  spine. Surgical clips project over the right upper quadrant. No acute bony  abnormality. The heart size and mediastinal contours are stable. Moderate  hiatal hernia projects posterior to the heart. The lung volumes are low. There prominence of the interstitial markings  which may be at least partially due to low lung volumes. Mild left basilar  airspace disease is noted. Impression Low lung volume study showing prominence of the interstitial markings,  increased since 04/20/2019. This finding may be at least partially due to  low lung volumes. Correlation for interstitial edema is recommended. Mild left basilar airspace disease, atelectasis, asymmetric edema or  pneumonia.          Echo 4/15/19  LVEF 35-40%  Dilated LA and RA      Assessment:     Hemoptysis due to,   Aspiration Pneumonia  CoronaVirus  Sepsis  Dysphagia  Acute encephalopathy - sedation related      Plan:      -CXR with scattered ground glass opacities, worse on the Right could represent aspiration pneumonia less likely pulmonary edema.   -will obtain CT chest, pt chronically aspirates so difficult to say if infectious process new. -new leukocytosis and tachycardia may point toward sepsis with pulmonoary source, but has just completed 7 day course for HCAP  -most likely cause of hemoptysis is acute bronchitis/pneumonia in setting of anticoagulation. Could also be pseudohemoptysis from unwitnessed epistaxis. Hemoptysis slowing down now. No episodes today. Please notify pulmonary for large volume hemoptysis. -cont to monitor closely for post sedation AMS. This note was transcribed using [id number] [employer]. Please disregard any translational errors.     Thank you for the consult    [address] Pulmonary, Sleep and Critical Care Medicine

## 2019-04-24 NOTE — PROGRESS NOTES
Speech Language Pathology    Patient remains unavailable out of room. ST to re-attempt at a later date. Thank you. [facility], [address] [facility], [id number]  Speech-Language Pathologist

## 2019-04-24 NOTE — PROGRESS NOTES
This RN in room with bedside RN. Pt coughing up blood which is new and change in status. This RN paged PA via calling service and gave bedside RN telephone number as callback number.      This RN let bedside RN know that page was placed and that she should be listening for a callback from the PA.     Janet Hidalgo RN

## 2019-04-24 NOTE — SIGNIFICANT EVENT
Discharge held. Nursing report pt coughing up blood. Hx of dysphagia with large hiatal hernia. Suspect upper GI source. Hemodynamics stable. Plan:    - NPO   - stop ASA and xarelto. - get stat CBC   - start low dose IVF. - start PPI IV BID.    - ask GI to evaluate. - trend Hgb. - hold discharge.      Claudell Hard  4/23/2019  8:23 PM

## 2019-04-24 NOTE — H&P
Pre-operative History and Physical    Patient: Franklyn Olszewski  : 1929  Acct#:     Intended Procedure: EGD     HISTORY OF PRESENT ILLNESS:  The patient is a 80 y.o. female  who presents for/due to Hematemesis       Past Medical History:        Diagnosis Date    Cataract     Chronic midline low back pain without sciatica     Esophageal dilatation 2018    PERFOMRD BY  DR. Brent Garcia  ([facility])  IN THE ED    Esophageal stricture 2018    Essential hypertension     GERD (gastroesophageal reflux disease)     Gouty arthropathy     History of breast cancer     Hypercholesterolemia     Incontinence     Lumbar spondylosis     Osteoporosis of multiple sites     Primary osteoarthritis involving multiple joints     Spinal stenosis, lumbar     Type 2 diabetes mellitus without complication, without long-term current use of insulin [facility])      Past Surgical History:        Procedure Laterality Date    CATARACT REMOVAL  2012    dr Gio Hayden left    CATARACT REMOVAL  2012    dr Gio Hayden  right    CHOLECYSTECTOMY      ESOPHAGEAL DILATATION N/A 2018    ESOPHAGEAL DILATION Helen Nigh performed by Komal Vital MD at [address] [facility] N/A 2018    ESOPHAGEAL DILATION Helen Nigh performed by Komal Vital MD at [place] [address] ENDOSCOPY N/A 2018    EGD ESOPHAGOGASTRODUODENOSCOPY performed by Komal Vital MD at [address] [place] [address]  2018    with [address] dil    UPPER GASTROINTESTINAL ENDOSCOPY N/A 2018    EGD DIAGNOSTIC ONLY performed by Komal Vital MD at [address] [place] [address]     Medications Prior to Admission:   Prior to Admission medications    Medication Sig Start Date End Date Taking?  Authorizing Provider   furosemide (LASIX) 20 MG tablet Take 2 tablets by mouth daily 19  Yes Claudell Hard, MD   insulin lispro (HUMALOG) 100 UNIT/ML injection vial Inject 5 Units into the skin 3 times daily (with meals) Plus per sliding scale **Medium Dose Corrective Algorithm** Glucose: Dose: If <139             No Insulin 140-199 2 Units 200-249 4 Units 250-299 6 Units 300-349 8 Units 350-400 10 Units Above 400       12 Units 4/23/19  Yes Sylvia Sheikh MD   Cholecalciferol (VITAMIN D) 2000 units CAPS capsule Take 2,000 Units by mouth daily   Yes Historical Provider, MD   metoprolol succinate (TOPROL XL) 25 MG extended release tablet Take 1 tablet by mouth daily 4/3/19  Yes Danny Cotton MD   rivaroxaban (XARELTO) 15 MG TABS tablet Take 1 tablet by mouth daily 4/2/19 7/1/19 Yes Danny Cotton MD   potassium chloride (KLOR-CON M) 10 MEQ extended release tablet Take 1 tablet by mouth every other day 2/28/19  Yes Martha Ruggiero MD   folic acid (FOLVITE) 1 MG tablet TAKE 5 TABLETS (TOTAL 5MG) BY MOUTH DAILY 2/11/19  Yes Martha Ruggiero MD   allopurinol (ZYLOPRIM) 100 MG tablet TAKE 0.5 TABLETS BY MOUTH DAILY FOR FOUR WEEKS AND THEN INCREASE TO 1 TABLET DAILY 1/2/19  Yes Ok Snyder MD   COLCRYS 0.6 MG tablet TAKE ONE TABLET BY MOUTH DAILY 1/2/19  Yes Ok Snyder MD   pantoprazole (PROTONIX) 40 MG tablet Take 1 tablet by mouth daily 12/6/18  Yes Vinayak Burns MD   aspirin 81 MG tablet Take 81 mg by mouth every other day    Yes Historical Provider, MD   escitalopram (LEXAPRO) 10 MG tablet TAKE ONE TABLET BY MOUTH DAILY 10/18/18  Yes Martha Ruggiero MD   Cyanocobalamin 1000 MCG/ML KIT Inject 1,000 mcg weekly for 4 weeks, then monthly afterward 4/4/18  Yes John Ledesma MD       Allergies:  Patient has no known allergies. Social History:   TOBACCO:   reports that she has never smoked. She has never used smokeless tobacco.  ETOH:   reports that she does not drink alcohol. DRUGS:   reports that she does not use drugs.     PHYSICAL EXAM:      Vital Signs: BP 97/61   Pulse 84   Temp 98.2 °F (36.8 °C) (Axillary)   Resp 16   Ht 5' 2\" (1.575 m)   Wt 123 lb 14.4 oz (56.2 kg)   SpO2 98%   BMI 22.66 kg/m²    Airway: No stridor or wheezing noted. Good air movement  Pulmonary: without wheezes. Clear to auscultation  Cardiac:regular rate and rhythm without loud murmurs  Abdomen:soft, nontender,  Bowel sounds present    Pre-Procedure Assessment / Plan:  1) EGD    ASA Grade:  ASA 3 - Patient with moderate systemic disease with functional limitations  Mallampati Classification:  Class II    Level of Sedation Plan: Moderate sedation    Post Procedure plan: Return to same level of care    I assessed the patient and find that the patient is in satisfactory condition to proceed with the planned procedure and sedation plan. I have explained the risk, benefits, and alternatives to the procedure; the patient understands and agrees to proceed.        Don Sands MD  4/24/2019

## 2019-04-24 NOTE — CONSULTS
GASTROENTEROLOGY INPATIENT CONSULTATION      IDENTIFYING DATA/REASON FOR CONSULTATION   PATIENT:  Cb Ferrari  MRN:  [id number]  ADMIT DATE: 4/15/2019  TIME OF EVALUATION: 4/24/2019 7:36 AM  HOSPITAL STAY:   LOS: 9 days     REASON FOR CONSULTATION:  Hematemesis    HISTORY OF PRESENT ILLNESS   Ed Vonda is a 80 y.o. female who we are consulted to see for hematemesis. She has a PMH of Dementia, GERD, Esophageal Stricture, DM2, afib on xarelto, and Osteoporosis. She presented on 4/15/2019 with leg edema and shortness of breath and was admitted with CHF and suspected pneumonia. She was diuresed with IV lasix and treated with antibiotic. She clinically improved and was anticipating discharge yesterday but then had an episode of coughing, possibily vomiting, bright red blood. Daughter witnessed the episodes and reports she initially was coughing quite a bit then may have vomited a significant amount of blood. At time of visit, pt resting in bed in no apparent respiratory distress. She denies abdominal pain or shortness of breath. Blood work today shows stable hemoglobin, elevated white count (20.3 from 12.5), elevated BUN  (51 from 27). She has been placed on PPI gtt and xarelto held    Prior Endoscopic Evaluations:  EGD 12/13/18 with Dr. Murtaza Steen  1. Stricture at the upper esophageal sphincter and Schatzki ring in the lower esophagus toro dilated up to 40 [place] with mucosal tearing indicative of a successful dilation. 2.  4cm sliding hiatal hernia  3. Otherwise normal EGD.     PAST MEDICAL, SURGICAL, FAMILY, and SOCIAL HISTORY     Past Medical History:   Diagnosis Date    Cataract     Chronic midline low back pain without sciatica     Esophageal dilatation 12/12/2018    PERFOMRD BY  DR. Jewels Moore  ([facility])  IN THE ED    Esophageal stricture 12/2018    Essential hypertension     GERD (gastroesophageal reflux disease)     Gouty arthropathy     History of breast cancer     Hypercholesterolemia     Incontinence     Lumbar spondylosis     Osteoporosis of multiple sites     Primary osteoarthritis involving multiple joints     Spinal stenosis, lumbar     Type 2 diabetes mellitus without complication, without long-term current use of insulin [facility])      Past Surgical History:   Procedure Laterality Date    CATARACT REMOVAL  jan 2012    dr Kimberly Lopes left   Atul Lacer  Nov. 2012    dr Kimberly Lopes  right   [address] N/A 12/6/2018    ESOPHAGEAL DILATION Waynette Bays performed by Beverly Mead MD at [address] N/A 12/13/2018    ESOPHAGEAL DILATION Waynette Bays performed by Beverly Mead MD at [place] [address] ENDOSCOPY N/A 12/6/2018    EGD ESOPHAGOGASTRODUODENOSCOPY performed by Beverly Mead MD at [facility]  12/13/2018    with [address] dil    UPPER GASTROINTESTINAL ENDOSCOPY N/A 12/13/2018    EGD DIAGNOSTIC ONLY performed by Beverly Mead MD at [facility]. No pertinent family history.   Social History     Socioeconomic History    Marital status:      Spouse name: None    Number of children: 3    Years of education: None    Highest education level: None   Occupational History    None   Social Needs    Financial resource strain: None    Food insecurity:     Worry: None     Inability: None    Transportation needs:     Medical: None     Non-medical: None   Tobacco Use    Smoking status: Never Smoker    Smokeless tobacco: Never Used   Substance and Sexual Activity    Alcohol use: No     Alcohol/week: 0.0 oz    Drug use: No    Sexual activity: None   Lifestyle    Physical activity:     Days per week: None     Minutes per session: None    Stress: None   Relationships    Social connections:     Talks on phone: None     Gets together: None Attends Rastafari service: None     Active member of club or organization: None     Attends meetings of clubs or organizations: None     Relationship status: None    Intimate partner violence:     Fear of current or ex partner: None     Emotionally abused: None     Physically abused: None     Forced sexual activity: None   Other Topics Concern    None   Social History Narrative    Grew up in [place]    3 daughters, lives with  (who is seen here) and her daughter Yolanda Gallegos    7 grandkids, most in town       MEDICATIONS   SCHEDULED:    potassium chloride 20 mEq BID   furosemide 40 mg Daily   insulin lispro 5 Units TID WC   insulin lispro 0-12 Units TID WC   insulin lispro 0-6 Units Nightly   allopurinol 100 mg Daily   colchicine 0.6 mg Daily   escitalopram 10 mg Daily   metoprolol succinate 25 mg Daily   sodium chloride flush 10 mL 2 times per day     FLUIDS/DRIPS:     sodium chloride 50 mL/hr at 04/23/19 2202    pantoprozole (PROTONIX) infusion 8 mg/hr (04/23/19 2239)    dextrose       PRNs:   glucose 15 g PRN   dextrose 12.5 g PRN   glucagon (rDNA) 1 mg PRN   dextrose 100 mL/hr PRN   sodium chloride flush 10 mL PRN   perflutren lipid microspheres 1.5 mL ONCE PRN   perflutren lipid microspheres 1.5 mL ONCE PRN     ALLERGIES:  She No Known Allergies    REVIEW OF SYSTEMS   Pertinent ROS noted in HPI    PHYSICAL EXAM   [unfilled]   I/O last 3 completed shifts:   In: 200 [P.O.:360; I.V.:20]  Out: -       Physical Exam:  Gen: Resting in bed, NAD   CV: RRR no MRG   Pul: CTAB   Abd: Good bowel sounds throughout, no scars, soft, NT/ND, no masses, no HSM   Ext: No edema   Neuro: alert, pleasantly confused  Skin: No jaundice      LABS AND IMAGING     Recent Results (from the past 24 hour(s))   POCT Glucose    Collection Time: 04/23/19  7:54 AM   Result Value Ref Range    POC Glucose 183 (H) 70 - 99 mg/dl    Performed on ACCU-CHEK    POCT Glucose    Collection Time: 04/23/19 11:21 AM   Result Value Ref Range    POC Glucose 260 (H) 70 - 99 mg/dl    Performed on ACCU-CHEK    POCT Glucose    Collection Time: 04/23/19  6:58 PM   Result Value Ref Range    POC Glucose 191 (H) 70 - 99 mg/dl    Performed on ACCU-CHEK    CBC Auto Differential    Collection Time: 04/23/19  9:10 PM   Result Value Ref Range    WBC 12.5 (H) 4.0 - 11.0 K/uL    RBC 4.75 4.00 - 5.20 M/uL    Hemoglobin 12.5 12.0 - 16.0 g/dL    Hematocrit 40.0 36.0 - 48.0 %    MCV 84.2 80.0 - 100.0 fL    MCH 26.4 26.0 - 34.0 pg    MCHC 31.4 31.0 - 36.0 g/dL    RDW 19.9 (H) 12.4 - 15.4 %    Platelets 053 944 - 336 K/uL    MPV 9.8 5.0 - 10.5 fL    Neutrophils % 76.7 %    Lymphocytes % 11.3 %    Monocytes % 8.8 %    Eosinophils % 2.9 %    Basophils % 0.3 %    Neutrophils # 9.6 (H) 1.7 - 7.7 K/uL    Lymphocytes # 1.4 1.0 - 5.1 K/uL    Monocytes # 1.1 0.0 - 1.3 K/uL    Eosinophils # 0.4 0.0 - 0.6 K/uL    Basophils # 0.0 0.0 - 0.2 K/uL   Protime-INR    Collection Time: 04/23/19  9:10 PM   Result Value Ref Range    Protime 22.3 (H) 9.8 - 13.0 sec    INR 1.96 (H) 0.86 - 1.14   POCT Glucose    Collection Time: 04/23/19  9:28 PM   Result Value Ref Range    POC Glucose 197 (H) 70 - 99 mg/dl    Performed on ACCU-CHEK    Renal Function Panel    Collection Time: 04/24/19  5:41 AM   Result Value Ref Range    Sodium 142 136 - 145 mmol/L    Potassium 4.5 3.5 - 5.1 mmol/L    Chloride 102 99 - 110 mmol/L    CO2 26 21 - 32 mmol/L    Anion Gap 14 3 - 16    Glucose 251 (H) 70 - 99 mg/dL    BUN 51 (H) 7 - 20 mg/dL    CREATININE 1.4 (H) 0.6 - 1.2 mg/dL    GFR Non-African American 35 (A) >60    GFR  43 (A) >60    Calcium 8.9 8.3 - 10.6 mg/dL    Phosphorus 2.0 (L) 2.5 - 4.9 mg/dL    Alb 2.2 (L) 3.4 - 5.0 g/dL   CBC Auto Differential    Collection Time: 04/24/19  5:41 AM   Result Value Ref Range    WBC 20.3 (H) 4.0 - 11.0 K/uL    RBC 4.56 4.00 - 5.20 M/uL    Hemoglobin 12.2 12.0 - 16.0 g/dL    Hematocrit 39.0 36.0 - 48.0 %    MCV 85.4 80.0 - 100.0 fL    MCH 26.7 26.0 - 34.0 pg    MCHC now    If you have any questions or need any further information, please feel free to contact our consult team.  Thank you for allowing us to participate in the care of [facility]. Meghna Montes PA-C    Attending physician addendum:      I have personally seen and examined the patient, reviewed the patient's medical record and pertinent labs and clinical imaging. I have personally staffed the case with Meghna MADRIGAL. I agree with her consultation note, exam findings, assessment and plans  as written above. I have made appropriate modifications and edited her assessment and plan where needed to reflect my impression and plans for this patient. Kevin Otero is a 79 YO female with a PMH of Dementia, GERD, Esophageal Stricture, DM2, Afib on Xarelto and Osteoporosis. She presented on 4/15/2019 with leg edema and shortness of breath and was admitted with CHF and suspected pneumonia. We have been consulted regarding Hematemesis. Hematemesis vs Hemoptysis. Hemoglobin 12.5-12.2. Anticoagulation on hold. Plan for EGD today. Keep NPO. Thank you for allowing me to participate in this patient's care. If there are any questions or concerns regarding this patient, or the plan we have set in place, please feel free to contact me at [phone].      Radha Thomas MD

## 2019-04-24 NOTE — PROGRESS NOTES
Physical Therapy  Facility/Department: [address] PROGRESSIVE CARE  Daily Treatment Note/Cotx with OT   NAME: Carrie Mooney  : 1929  MRN: [id number]    Date of Service: 2019    Discharge Recommendations:  Patient would benefit from continued therapy after discharge, 3-5 sessions per week   PT Equipment Recommendations  Equipment Needed: No    Patient Diagnosis(es): The primary encounter diagnosis was HCAP (healthcare-associated pneumonia). Diagnoses of Sepsis, due to unspecified organism ([address] Lactic acidosis, Chronic kidney disease, unspecified CKD stage, Type 2 diabetes mellitus with hyperglycemia, unspecified whether long term insulin use ([address] Congestive heart failure, unspecified HF chronicity, unspecified heart failure type ([address] Paroxysmal atrial fibrillation ([address] Atrial flutter, paroxysmal ([address] Localized swelling of both lower legs, and Other hypervolemia were also pertinent to this visit. has a past medical history of Cataract, Chronic midline low back pain without sciatica, Esophageal dilatation, Esophageal stricture, Essential hypertension, GERD (gastroesophageal reflux disease), Gouty arthropathy, History of breast cancer, Hypercholesterolemia, Incontinence, Lumbar spondylosis, Osteoporosis of multiple sites, Primary osteoarthritis involving multiple joints, Spinal stenosis, lumbar, and Type 2 diabetes mellitus without complication, without long-term current use of insulin ([address] has a past surgical history that includes Mastectomy; Cataract removal (2012); Cataract removal (2012); Cholecystectomy; Upper gastrointestinal endoscopy; Upper gastrointestinal endoscopy (N/A, 2018); Esophagus dilation (N/A, 2018); Upper gastrointestinal endoscopy (2018); Upper gastrointestinal endoscopy (N/A, 2018); and Esophagus dilation (N/A, 2018).     Restrictions  Restrictions/Precautions  Restrictions/Precautions: Fall Risk  Position Activity Restriction  Other position/activity restrictions: diet: dysphagia I pureed/ honey thick  Subjective   General  Chart Reviewed: Yes  Additional Pertinent Hx: Per Dr. Beau Gunderson, 4/15, \"This is a 80 y.o. female who presented to the ED on 4/15 with a CC of SOB. Per ED notes she was recently discharged from here with pneumonia, CHF and afib. She went to nursing home. She came back with SOB and admitted for HCAP among other things. She is not able to provide CC or HPI due to underlying dementia. \"  PMH includes cataract, essential HTN, GERD, gouty arthropathy, breast Ca, lumbar spondylosis, DMII, Altz's dementia  Response To Previous Treatment: Patient with no complaints from previous session. Family / Caregiver Present: Yes(daughter & spouse)  Referring Practitioner: Amee Randhawa MD  Subjective  Subjective: Pt in supine, awake & nodded \"yes\" to therapy. Pt's speech unintelligable today. Pt's d/c cancelled due to needing another procedure prior to d/c. Pt denied real c/o pain. Objective   Bed mobility  Rolling to Left: Maximum assistance  Rolling to Right: Maximum assistance  Supine to Sit: Moderate assistance;Maximum assistance;2 Person assistance  Sit to Supine: Moderate assistance;2 Person assistance;Maximum assistance  Scooting: Maximal assistance;2 Person assistance;Dependent/Total(to eob prior to transfer attempt, up into bed at end of session. )  Transfers  Sit to Stand: 2 Person Assistance;Maximum Assistance  Stand to sit: 2 Person Assistance;Maximum Assistance  Comment: attempted stance at [facility] for support, Max A x 2 persons. Pt unable to attempt standing further 2/2 incontinence. Opted to return pt back into supine for all pericare & complete linens change. Ambulation  Ambulation?: No(only attempted stance at RW x 1 trial this date--pt incontinent of BM, thus limited session & needed to be cleaned up. Pt back into supine for all clean-up.   Pt left in supine at end of session. )     Balance  Comments: pt sat eob with Min A and posterior lean this date, needed Max A x 2 person for very brief stance. Comment: Dependent for all pericrae & cleaning up after pt incontinent of bowels. Pt was in supine at end of session, needs in reach. Assessment   Body structures, Functions, Activity limitations: Decreased functional mobility   Assessment: Pt cont to be limited medically and is pending further testing this date. Today, pt needed Mod-Max A x 2 persons for bed mobiltiy supine<>sit, and rolling R/L, as well as for an attempted sit<>stand at [facility], very briefly before pt was incontinent. Will need ongoing therapy to maximize her function prior to return home with family. Will cont to follow while hospitalized. Prognosis: Fair  Patient Education: role & goals of PT   REQUIRES PT FOLLOW UP: Yes  Activity Tolerance  Activity Tolerance: Patient limited by cognitive status; Patient limited by endurance        AM-PAC Score  AM-PAC Inpatient Mobility Raw Score : 9  AM-[facility] Inpatient T-Scale Score : 30.55  Mobility Inpatient CMS 0-100% Score: 81.38  Mobility Inpatient CMS G-Code Modifier : NICKY Ferrari scored a 9/24 on the AM-PAC short mobility form. Current research shows that an AM-PAC score of 17 or less is typically not associated with a discharge to the patient's home setting. Based on the patients AM-PAC score and their current functional mobility deficits, it is recommended that the patient have 3-5 sessions per week of Physical Therapy at d/c to increase the patients independence. Goals  Short term goals  Time Frame for Short term goals: upon d/c--goals ongoing, with limited progress this date. 4/24/19   Short term goal 1: Bed mobility with mod A of 1-2. Short term goal 2: Transfers sit <> stand with mod A of 1-2. Short term goal 3: Tolerate sitting up in recliner >2 hours. Short term goal 4: Ambulate with the walker 10 feet with mod A of 2.    Patient Goals   Patient goals : dgt would like

## 2019-04-25 LAB
ALBUMIN SERPL-MCNC: 1.8 G/DL (ref 3.4–5)
ANION GAP SERPL CALCULATED.3IONS-SCNC: 12 MMOL/L (ref 3–16)
BASE EXCESS ARTERIAL: 5.2 MMOL/L (ref -3–3)
BASOPHILS ABSOLUTE: 0.1 K/UL (ref 0–0.2)
BASOPHILS RELATIVE PERCENT: 0.5 %
BUN BLDV-MCNC: 52 MG/DL (ref 7–20)
C DIFFICILE TOXIN, EIA: NORMAL
CALCIUM SERPL-MCNC: 8.9 MG/DL (ref 8.3–10.6)
CARBOXYHEMOGLOBIN ARTERIAL: 2.3 % (ref 0–1.5)
CHLORIDE BLD-SCNC: 113 MMOL/L (ref 99–110)
CO2: 27 MMOL/L (ref 21–32)
CREAT SERPL-MCNC: 1.2 MG/DL (ref 0.6–1.2)
EOSINOPHILS ABSOLUTE: 0.1 K/UL (ref 0–0.6)
EOSINOPHILS RELATIVE PERCENT: 0.7 %
GFR AFRICAN AMERICAN: 51
GFR NON-AFRICAN AMERICAN: 42
GLUCOSE BLD-MCNC: 100 MG/DL (ref 70–99)
GLUCOSE BLD-MCNC: 100 MG/DL (ref 70–99)
GLUCOSE BLD-MCNC: 106 MG/DL (ref 70–99)
GLUCOSE BLD-MCNC: 133 MG/DL (ref 70–99)
GLUCOSE BLD-MCNC: 151 MG/DL (ref 70–99)
GLUCOSE BLD-MCNC: 92 MG/DL (ref 70–99)
HCO3 ARTERIAL: 29.4 MMOL/L (ref 21–29)
HCT VFR BLD CALC: 33.7 % (ref 36–48)
HEMOGLOBIN, ART, EXTENDED: 10 G/DL (ref 12–16)
HEMOGLOBIN: 10.6 G/DL (ref 12–16)
LYMPHOCYTES ABSOLUTE: 2.1 K/UL (ref 1–5.1)
LYMPHOCYTES RELATIVE PERCENT: 11.5 %
MCH RBC QN AUTO: 27 PG (ref 26–34)
MCHC RBC AUTO-ENTMCNC: 31.4 G/DL (ref 31–36)
MCV RBC AUTO: 86 FL (ref 80–100)
METHEMOGLOBIN ARTERIAL: 0.7 %
MONOCYTES ABSOLUTE: 1 K/UL (ref 0–1.3)
MONOCYTES RELATIVE PERCENT: 5.5 %
MRSA SCREEN RT-PCR: NORMAL
NEUTROPHILS ABSOLUTE: 15.1 K/UL (ref 1.7–7.7)
NEUTROPHILS RELATIVE PERCENT: 81.8 %
O2 CONTENT ARTERIAL: 12 ML/DL
O2 SAT, ARTERIAL: 89.9 %
O2 THERAPY: ABNORMAL
OCCULT BLOOD SCREENING: ABNORMAL
PCO2 ARTERIAL: 43.2 MMHG (ref 35–45)
PDW BLD-RTO: 20.6 % (ref 12.4–15.4)
PERFORMED ON: ABNORMAL
PERFORMED ON: NORMAL
PH ARTERIAL: 7.45 (ref 7.35–7.45)
PHOSPHORUS: 2.2 MG/DL (ref 2.5–4.9)
PLATELET # BLD: 176 K/UL (ref 135–450)
PMV BLD AUTO: 10 FL (ref 5–10.5)
PO2 ARTERIAL: 52.4 MMHG (ref 75–108)
POTASSIUM SERPL-SCNC: 3.6 MMOL/L (ref 3.5–5.1)
PROCALCITONIN: 2.57 NG/ML (ref 0–0.15)
RBC # BLD: 3.92 M/UL (ref 4–5.2)
SODIUM BLD-SCNC: 152 MMOL/L (ref 136–145)
TCO2 ARTERIAL: 30.7 MMOL/L
WBC # BLD: 18.5 K/UL (ref 4–11)

## 2019-04-25 PROCEDURE — 2500000003 HC RX 250 WO HCPCS: Performed by: PHYSICIAN ASSISTANT

## 2019-04-25 PROCEDURE — C9113 INJ PANTOPRAZOLE SODIUM, VIA: HCPCS | Performed by: INTERNAL MEDICINE

## 2019-04-25 PROCEDURE — G0328 FECAL BLOOD SCRN IMMUNOASSAY: HCPCS

## 2019-04-25 PROCEDURE — 36600 WITHDRAWAL OF ARTERIAL BLOOD: CPT

## 2019-04-25 PROCEDURE — 2580000003 HC RX 258: Performed by: INTERNAL MEDICINE

## 2019-04-25 PROCEDURE — 85025 COMPLETE CBC W/AUTO DIFF WBC: CPT

## 2019-04-25 PROCEDURE — 84145 PROCALCITONIN (PCT): CPT

## 2019-04-25 PROCEDURE — 2500000003 HC RX 250 WO HCPCS: Performed by: INTERNAL MEDICINE

## 2019-04-25 PROCEDURE — 87324 CLOSTRIDIUM AG IA: CPT

## 2019-04-25 PROCEDURE — 6360000002 HC RX W HCPCS: Performed by: INTERNAL MEDICINE

## 2019-04-25 PROCEDURE — 80069 RENAL FUNCTION PANEL: CPT

## 2019-04-25 PROCEDURE — 99233 SBSQ HOSP IP/OBS HIGH 50: CPT | Performed by: INTERNAL MEDICINE

## 2019-04-25 PROCEDURE — 94640 AIRWAY INHALATION TREATMENT: CPT

## 2019-04-25 PROCEDURE — 2700000000 HC OXYGEN THERAPY PER DAY

## 2019-04-25 PROCEDURE — 87449 NOS EACH ORGANISM AG IA: CPT

## 2019-04-25 PROCEDURE — 6370000000 HC RX 637 (ALT 250 FOR IP): Performed by: INTERNAL MEDICINE

## 2019-04-25 PROCEDURE — 82803 BLOOD GASES ANY COMBINATION: CPT

## 2019-04-25 PROCEDURE — 92526 ORAL FUNCTION THERAPY: CPT

## 2019-04-25 PROCEDURE — 36415 COLL VENOUS BLD VENIPUNCTURE: CPT

## 2019-04-25 PROCEDURE — 1200000000 HC SEMI PRIVATE

## 2019-04-25 PROCEDURE — 94760 N-INVAS EAR/PLS OXIMETRY 1: CPT

## 2019-04-25 RX ORDER — SODIUM CHLORIDE FOR INHALATION 3 %
15 VIAL, NEBULIZER (ML) INHALATION 3 TIMES DAILY
Status: DISCONTINUED | OUTPATIENT
Start: 2019-04-25 | End: 2019-04-26 | Stop reason: HOSPADM

## 2019-04-25 RX ORDER — POTASSIUM CHLORIDE AND SODIUM CHLORIDE 450; 150 MG/100ML; MG/100ML
INJECTION, SOLUTION INTRAVENOUS CONTINUOUS
Status: DISCONTINUED | OUTPATIENT
Start: 2019-04-25 | End: 2019-04-26 | Stop reason: HOSPADM

## 2019-04-25 RX ADMIN — METOPROLOL TARTRATE 5 MG: 5 INJECTION INTRAVENOUS at 04:48

## 2019-04-25 RX ADMIN — POTASSIUM CHLORIDE AND SODIUM CHLORIDE: 450; 150 INJECTION, SOLUTION INTRAVENOUS at 13:41

## 2019-04-25 RX ADMIN — METOPROLOL TARTRATE 5 MG: 5 INJECTION INTRAVENOUS at 13:25

## 2019-04-25 RX ADMIN — MICONAZOLE NITRATE: 20 POWDER TOPICAL at 21:41

## 2019-04-25 RX ADMIN — SODIUM CHLORIDE SOLN NEBU 3% 4 ML: 3 NEBU SOLN at 20:11

## 2019-04-25 RX ADMIN — Medication 10 ML: at 22:21

## 2019-04-25 RX ADMIN — INSULIN LISPRO 2 UNITS: 100 INJECTION, SOLUTION INTRAVENOUS; SUBCUTANEOUS at 22:19

## 2019-04-25 RX ADMIN — MICONAZOLE NITRATE: 20 POWDER TOPICAL at 02:00

## 2019-04-25 RX ADMIN — SODIUM CHLORIDE SOLN NEBU 3% 15 ML: 3 NEBU SOLN at 13:55

## 2019-04-25 RX ADMIN — Medication 10 ML: at 13:47

## 2019-04-25 RX ADMIN — METOPROLOL TARTRATE 5 MG: 5 INJECTION INTRAVENOUS at 18:03

## 2019-04-25 RX ADMIN — SODIUM CHLORIDE SOLN NEBU 3% 4 ML: 3 NEBU SOLN at 10:41

## 2019-04-25 RX ADMIN — METOPROLOL TARTRATE 5 MG: 5 INJECTION INTRAVENOUS at 22:19

## 2019-04-25 RX ADMIN — PIPERACILLIN SODIUM,TAZOBACTAM SODIUM 3.38 G: 3; .375 INJECTION, POWDER, FOR SOLUTION INTRAVENOUS at 22:19

## 2019-04-25 RX ADMIN — MICONAZOLE NITRATE: 20 POWDER TOPICAL at 13:47

## 2019-04-25 RX ADMIN — PIPERACILLIN SODIUM,TAZOBACTAM SODIUM 3.38 G: 3; .375 INJECTION, POWDER, FOR SOLUTION INTRAVENOUS at 13:46

## 2019-04-25 RX ADMIN — PANTOPRAZOLE SODIUM 40 MG: 40 INJECTION, POWDER, FOR SOLUTION INTRAVENOUS at 13:36

## 2019-04-25 ASSESSMENT — PAIN SCALES - PAIN ASSESSMENT IN ADVANCED DEMENTIA (PAINAD)
NEGVOCALIZATION: 0
BODYLANGUAGE: 0
NEGVOCALIZATION: 0
TOTALSCORE: 0
CONSOLABILITY: 0
NEGVOCALIZATION: 0
TOTALSCORE: 0
CONSOLABILITY: 0
BREATHING: 0
TOTALSCORE: 0
BREATHING: 0
TOTALSCORE: 0
FACIALEXPRESSION: 0
FACIALEXPRESSION: 0
BREATHING: 0
CONSOLABILITY: 0
FACIALEXPRESSION: 0
BODYLANGUAGE: 0
CONSOLABILITY: 0
FACIALEXPRESSION: 0
NEGVOCALIZATION: 0
BREATHING: 0

## 2019-04-25 ASSESSMENT — PAIN SCALES - GENERAL
PAINLEVEL_OUTOF10: 0
PAINLEVEL_OUTOF10: 0

## 2019-04-25 NOTE — PROGRESS NOTES
Dr. Jovon Messer on unit, this RN updated physician about decline overnight, bleeding and drop in hemoglobin, Dr. ROJAS updated this RN about plan of care for today and spoke with family as well, will continue to monitor pt

## 2019-04-25 NOTE — PROGRESS NOTES
Pulmonary Progress Note    Date of Admission: 4/15/2019   LOS: 10 days     Chief Complaint   Patient presents with    Shortness of Breath       Assessment:     Acute hypoxemic respiratory failure, SPO2 less than 90% on room air  Recurrent aspiration pneumonia  Sepsis  Hemoptysis  Acute metabolic encephalopathy    Plan:      -White count is back up, her mental status is poor, and she is now on 2 L of oxygen with sepsis due to recurrent aspiration pneumonia  -Agree with initiation of empiric antibiotics once again for sepsis  -No further episodes of hemoptysis, was likely due to her acute infectious process in the setting of active anti-coagulation  -She has been admitted multiple times for recurrent pneumonia, all directly due to her chronic aspiration which presumably is not going to get any better.  -Agree with palliative care consult    Patient is at high risk of clinical deterioration due to sepsis, new oxygen requirement, and acute decline in her mental status. 24 Hour Events/Subjective  Patient is still sleepy today, she does awaken to say hi, but is minimally conversant. Falls back asleep. Family is at bedside. ROS:   Unable to obtain due to poor mental status      Intake/Output Summary (Last 24 hours) at 4/25/2019 1102  Last data filed at 4/24/2019 2314  Gross per 24 hour   Intake 20 ml   Output --   Net 20 ml         PHYSICAL EXAM:   Blood pressure 114/73, pulse 81, temperature 97.2 °F (36.2 °C), temperature source Axillary, resp. rate 28, height 5' 2\" (1.575 m), weight 121 lb 11.1 oz (55.2 kg), SpO2 91 %. Gen:  No acute distress. Lethargic  Eyes: PERRL. Anicteric sclera. No conjunctival injection. ENT: No discharge. Posterior oropharynx clear. External appearance of ears and nose normal.  Neck: Trachea midline. No mass   Resp:  Scattered bilateral crackles. No wheezes. No rhonchi. No dullness on percussion. CV: Regular rate. Regular rhythm. No murmur or rub. No edema. GI: Soft, Non-tender. Non-distended. +BS  Skin: Warm, dry, w/o erythema. Lymph: No cervical or supraclavicular LAD. M/S: No cyanosis. No clubbing. Neuro:   no focal neurologic deficit, but minimally interactive. Sleeps through exam Moves all extremities      Medications:    Scheduled Meds:   miconazole   Topical BID    piperacillin-tazobactam  3.375 g Intravenous Q8H    sodium chloride (Inhalant)  15 mL Nebulization TID    insulin lispro  0-12 Units Subcutaneous Q4H    metoprolol  5 mg Intravenous Q6H    vitamin D  2,000 Units Oral Daily    pantoprazole  40 mg Intravenous Daily    And    sodium chloride (PF)  10 mL Intravenous Daily    allopurinol  100 mg Oral Daily    colchicine  0.6 mg Oral Daily    escitalopram  10 mg Oral Daily    sodium chloride flush  10 mL Intravenous 2 times per day       Continuous Infusions:   0.45 % NaCl with KCl 20 mEq      dextrose         PRN Meds:  glucose, dextrose, glucagon (rDNA), dextrose, sodium chloride flush, perflutren lipid microspheres, perflutren lipid microspheres    Labs reviewed:  CBC:   Recent Labs     04/23/19 2110 04/24/19  0541 04/25/19  0554   WBC 12.5* 20.3* 18.5*   HGB 12.5 12.2 10.6*   HCT 40.0 39.0 33.7*   MCV 84.2 85.4 86.0    223 176     BMP:   Recent Labs     04/23/19  0550 04/24/19  0541 04/25/19  0554    142 152*   K 3.7 4.5 3.6    102 113*   CO2 33* 26 27   PHOS 2.2* 2.0* 2.2*   BUN 27* 51* 52*   CREATININE 1.1 1.4* 1.2     LIVER PROFILE: No results for input(s): AST, ALT, LIPASE, BILIDIR, BILITOT, ALKPHOS in the last 72 hours. Invalid input(s): AMYLASE,  ALB  PT/INR:   Recent Labs     04/23/19 2110   PROTIME 22.3*   INR 1.96*     APTT: No results for input(s): APTT in the last 72 hours. UA:No results for input(s): NITRITE, COLORU, PHUR, LABCAST, WBCUA, RBCUA, MUCUS, TRICHOMONAS, YEAST, BACTERIA, CLARITYU, SPECGRAV, LEUKOCYTESUR, UROBILINOGEN, BILIRUBINUR, BLOODU, GLUCOSEU, AMORPHOUS in the last 72 hours.     Invalid input(s): KETONESU  No results for input(s): PH, PCO2, PO2 in the last 72 hours. Films:  Radiology Review:  Pertinent images / reports were reviewed as a part of this visit. CT chest without contrast images reviewed personally by me, and interpretation as follows: Patchy groundglass infiltrates, consistent with recent pneumonia. CT Chest w/o contrast:   Results for orders placed during the hospital encounter of 04/15/19   CT CHEST WO CONTRAST    Narrative EXAMINATION:  CT OF THE CHEST WITHOUT CONTRAST 4/24/2019 2:01 pm    TECHNIQUE:  CT of the chest was performed without the administration of intravenous  contrast. Multiplanar reformatted images are provided for review. Dose  modulation, iterative reconstruction, and/or weight based adjustment of the  mA/kV was utilized to reduce the radiation dose to as low as reasonably  achievable. COMPARISON:  03/29/2019, CT 10/02/2012    HISTORY:  ORDERING SYSTEM PROVIDED HISTORY: HEMOPTYSIS  TECHNOLOGIST PROVIDED HISTORY:  Ordering Physician Provided Reason for Exam: HEMOPTYSIS  Acuity: Acute  Type of Exam: Initial    FINDINGS:  Mediastinum: Mild-moderate cardiomegaly. Severe coronary atherosclerosis. No pericardial effusion. Left chest port with catheter tip at the cavoatrial  junction. Moderate-sized hiatal hernia. No thoracic adenopathy. Lungs/pleura: Secretions are present in the trachea extending into the left  mainstem bronchus. No pneumothorax. No pleural effusion. Multifocal areas  of consolidation are slightly improved since 03/29/2019. Breathing motion  artifact limits full characterization. The consolidation has a peripheral  subpleural predominance especially involving the lower lobes. There is  likely a component of superimposed scarring. Upper Abdomen: Chronic right hydronephrosis, similar to remote CT in 2012. Soft Tissues/Bones: No acute osseous abnormality. Stimulator device in the  thoracic spine.       Impression Peripheral subpleural areas of multifocal consolidation in the lungs with  slight improvement from 03/29/2019. Secretions in the trachea and left mainstem bronchus. Chronic right hydronephrosis. CTPA: No results found for this or any previous visit. CXR PA/LAT:   Results for orders placed during the hospital encounter of 04/15/19   XR CHEST STANDARD (2 VW)    Narrative EXAMINATION:  TWO VIEWS OF THE CHEST    4/20/2019 10:29 am    COMPARISON:  04/17/2019    HISTORY:  ORDERING SYSTEM PROVIDED HISTORY: pneumonia CHF  TECHNOLOGIST PROVIDED HISTORY:  Reason for exam:->pneumonia CHF  Ordering Physician Provided Reason for Exam: pneumonia CHF  Acuity: Acute  Type of Exam: Initial    Follow-up exam.    FINDINGS:  Heart is slightly enlarged. The pulmonary vasculature is slightly to mildly  prominent but certainly less prominent than on the prior study. Right  pleural effusion previously present not appreciated. No pneumonia  identified. Left subclavian port and neurostimulator wires are present. Impression Mild pulmonary vascular congestion remains but is significantly improved from  the prior study. No sizable pleural effusion appreciated. CXR portable:   Results for orders placed during the hospital encounter of 04/15/19   XR CHEST PORTABLE    Narrative EXAMINATION:  SINGLE XRAY VIEW OF THE CHEST    4/23/2019 8:26 pm    COMPARISON:  Multiple prior studies including most recent chest x-ray 04/20/2019, CT chest  03/29/2019    HISTORY:  ORDERING SYSTEM PROVIDED HISTORY: hemoptysis. TECHNOLOGIST PROVIDED HISTORY:  Reason for exam:->hemoptysis. Ordering Physician Provided Reason for Exam: hemoptysis  Acuity: Acute  Type of Exam: Initial    FINDINGS:  A left subclavian central venous catheter is not changed. Also again noted  are radiopaque leads projecting over the lower spinal canal of the thoracic  spine. Surgical clips project over the right upper quadrant. No acute bony  abnormality.   The heart size and mediastinal contours are stable. Moderate  hiatal hernia projects posterior to the heart. The lung volumes are low. There prominence of the interstitial markings  which may be at least partially due to low lung volumes. Mild left basilar  airspace disease is noted. Impression Low lung volume study showing prominence of the interstitial markings,  increased since 04/20/2019. This finding may be at least partially due to  low lung volumes. Correlation for interstitial edema is recommended. Mild left basilar airspace disease, atelectasis, asymmetric edema or  pneumonia. Echo 4/15/19  LVEF 35-40%  Dilated LA and RA       This note was transcribed using [id number]. Please disregard any translational errors.     Thank you for this consult,    Ganga Osuna [address] [place] [address] Pulmonary, Critical Care, and Sleep Medicine

## 2019-04-25 NOTE — SIGNIFICANT EVENT
ADVANCED CARE PLANNING    Name:Nayeli Kam       :  1929              MRN:  [id number]      Purpose of Encounter: Advanced care planning in light of multi system organ failure. Parties in attendance: :Winter Sanchez MD, Family members:, daughter. Decisional Capacity:No    Diagnosis: Active Problems:    Sepsis ([address]    Acute respiratory failure with hypoxia (HCC)    Abnormal radiograph  Resolved Problems:    * No resolved hospital problems. *    Patients Medical Story: 81yo woman with dysphagia, recurrent aspiration PNA with sepsis, systolic CHF. Condition worsening. Recurrent sepsis, new GI bleed on xarelto, recurrent aspiration, declining functional status and mental status. Goals of Care Determinations: Patient wishes to focus on comfort  Plan: Will notify Danya Granados MD of change in care plan. Will look at further interventions as needed. Code Status: At this time patient wishes to be DNR-CC  Time Spent with Patient: 30 minutes      Electronically signed by Neftaly Isaacs MD on 2019 at 5:51 PM  Thank you Danya Granados MD for the opportunity to be involved in this patient's care.

## 2019-04-25 NOTE — CONSULTS
[facility]  Palliative Care   Consult Note    NAME:  Bennett Gonsalves [place] RECORD NUMBER:  [id number]  AGE: 80 y.o.    GENDER: female  : 1929  TODAY'S DATE:  2019    Subjective     Reason for Consult:  goals of care, hospice discussion and code status   Visit Type: Initial Consult      Annmarie Henriquez is a 80 y.o. female referred by:  [x] Physician    PAST MEDICAL HISTORY      Diagnosis Date    Cataract     Chronic midline low back pain without sciatica     Esophageal dilatation 2018    PERFOMRD BY  DR. Nicola Garcia  ([facility])  IN THE ED    Esophageal stricture 2018    Essential hypertension     GERD (gastroesophageal reflux disease)     Gouty arthropathy     History of breast cancer     Hypercholesterolemia     Incontinence     Lumbar spondylosis     Osteoporosis of multiple sites     Primary osteoarthritis involving multiple joints     Spinal stenosis, lumbar     Type 2 diabetes mellitus without complication, without long-term current use of insulin ([facility]        PAST SURGICAL HISTORY  Past Surgical History:   Procedure Laterality Date    CATARACT REMOVAL  2012    dr Gloria Cabrera left    CATARACT REMOVAL  2012    dr Gloria Cabrera  right    CHOLECYSTECTOMY      ESOPHAGEAL DILATATION N/A 2018    ESOPHAGEAL DILATION [place]irstie [place] performed by Timbo Crook MD at [address] N/A 2018    ESOPHAGEAL DILATION Kirstie Arturo performed by Timbo Crook MD at [place] [address] ENDOSCOPY N/A 2018    EGD ESOPHAGOGASTRODUODENOSCOPY performed by Timbo Crook MD at [facility]  2018    with [address] dil    UPPER GASTROINTESTINAL ENDOSCOPY N/A 2018    EGD DIAGNOSTIC ONLY performed by Timbo Crook MD at [facility] 2019    EGD DIAGNOSTIC ONLY performed by Radha Thomas MD at [address] [facility]  History reviewed. No pertinent family history. SOCIAL HISTORY  Social History     Tobacco Use    Smoking status: Never Smoker    Smokeless tobacco: Never Used   Substance Use Topics    Alcohol use: No     Alcohol/week: 0.0 oz    Drug use: No       ALLERGIES  No Known Allergies    MEDICATIONS  No current facility-administered medications on file prior to encounter.       Current Outpatient Medications on File Prior to Encounter   Medication Sig Dispense Refill    Cholecalciferol (VITAMIN D) 2000 units CAPS capsule Take 2,000 Units by mouth daily      metoprolol succinate (TOPROL XL) 25 MG extended release tablet Take 1 tablet by mouth daily 30 tablet 3    rivaroxaban (XARELTO) 15 MG TABS tablet Take 1 tablet by mouth daily 90 tablet 0    potassium chloride (KLOR-CON M) 10 MEQ extended release tablet Take 1 tablet by mouth every other day 45 tablet 1    folic acid (FOLVITE) 1 MG tablet TAKE 5 TABLETS (TOTAL 5MG) BY MOUTH DAILY 150 tablet 3    allopurinol (ZYLOPRIM) 100 MG tablet TAKE 0.5 TABLETS BY MOUTH DAILY FOR FOUR WEEKS AND THEN INCREASE TO 1 TABLET DAILY 30 tablet 2    COLCRYS 0.6 MG tablet TAKE ONE TABLET BY MOUTH DAILY 30 tablet 2    pantoprazole (PROTONIX) 40 MG tablet Take 1 tablet by mouth daily 30 tablet 6    aspirin 81 MG tablet Take 81 mg by mouth every other day       escitalopram (LEXAPRO) 10 MG tablet TAKE ONE TABLET BY MOUTH DAILY 30 tablet 5    Cyanocobalamin 1000 MCG/ML KIT Inject 1,000 mcg weekly for 4 weeks, then monthly afterward 4 kit 3       Objective         /73   Pulse 81   Temp 97.2 °F (36.2 °C) (Axillary)   Resp 28   Ht 5' 2\" (1.575 m)   Wt 121 lb 11.1 oz (55.2 kg)   SpO2 91%   BMI 22.26 kg/m²     Code Status: DNR CC    Advanced Directives: completed     Assessment        Management and Education    Persons available for education:     [x] Self     [] Caregiver       [x] Spouse

## 2019-04-25 NOTE — PROGRESS NOTES
[facility]   Speech Therapy  Daily Dysphagia Treatment Note    Alice Huddleston  AGE: 80 y.o. GENDER: female  : 1929  [id number]  EPISODE DATE:  4/15/2019     Patient Active Problem List   Diagnosis    Primary osteoarthritis involving multiple joints    History of breast cancer    Chronic midline low back pain without sciatica    Essential hypertension    Cataract    Lumbar spondylosis    Spinal stenosis, lumbar    Gouty arthropathy    Hypercholesterolemia    CKD (chronic kidney disease) stage 3, GFR 30-59 ml/min ([facility])    Folate deficiency    Vitamin B12 deficiency    Dementia without behavioral disturbance    Esophageal foreign body    Prediabetes    CAP (community acquired pneumonia) due to Chlamydia species    CHF (congestive heart failure), NYHA class I, acute on chronic, combined ([address]    Typical atrial flutter (HCC)    Aspiration pneumonia ([address]    Sepsis ([address]    Acute respiratory failure with hypoxia ([address]    Abnormal radiograph     No Known Allergies  Treatment Diagnosis: Dysphagia     Chart Review:  Alice Huddleston has a past medical history of Cataract, Chronic midline low back pain without sciatica, Essential hypertension, GERD (gastroesophageal reflux disease), Gouty arthropathy, History of breast cancer, Hypercholesterolemia, Incontinence, Lumbar spondylosis, Osteoporosis of multiple sites, Primary osteoarthritis involving multiple joints, Spinal stenosis, lumbar, and Type 2 diabetes mellitus without complication, without long-term current use of insulin ([address] History of Dysphagia dating back to 2018    MD History and Physical Documentation revealed  Heath Carbajal a 80 y.o. female who presents to the emergency department shortness of breath, hypertension. Patient was discharged from [facility] [place] on . She was treated for community-acquired pneumonia, aspiration pneumonia, congestive heart failure atrial fibrillation.  She was discharged to the [facility].     Recent Chest Xray: 4/15/2019      Mild progressive perihilar opacities which may represent developing pulmonary   edema or progressive bronchopneumonia.          Pt is known to SLP Department; recent MBSS completed 4/1/2019 with recommendation for puree with nectar thick liquids 2/2 to Moderate Oropharyngeal Dysphagia characterized by labored mastication; reduced bolus formation and A-P oral transit; delayed swallow , decreased laryngeal elevation and decreased pharyngeal peristalsis. 4/25/19: new onset GI bleed; hospice consult    Subjective:   Current diet:  Clear liquids, nectar thickened  (puree on hold 2/2 blood in stool)    Comments regarding tolerating Current Diet:  Family reports decreased PO intake, but taking limited nectar and honey liquids today; family reports they are afraid to feed her in case she chokes    Objective:     Pain   Patient Currently in Pain: No    Cognitive/Behavior   Behavior/Cognition: lethargic, Cooperative, Confused, Pleasant mood    Presentations   -Nectar thick via tsp and cup    Positioning  Upright in bed     Dysphagia Tx:  Direct Dysphagia tx focusing on safe toleration of liquids:  · Family educated upon SLP entry as they were leaving the room: verbalized understanding of current diet/liquid level and risk of aspiration.   Family was only feeding pt liquid from a syringe today; SLP educated on use of teaspoon v controlled sips from cup  · NTL via tsp: tolerated 5/5 with mild anterior loss r/t decreased labial seal and lip rounding for acceptance/removal from spoon; decreased bolus control with concern for premature spillage; swallow delay and decreased laryngeal elevation, but no overt s/s  · NTL via cup: tolerated 6/6 with occasional anterior loss, decreased bolus control with concern for prematuer spillage; swallow delay and decreased laryngeal elevation, but no overt s/s   · Puree textures held as pt is currently on clear liquids      Goals:   Dysphagia Goals:   1. The patient will tolerate recommended diet without observed clinical signs of aspiration. Goal met  2. The patient/caregiver will demonstrate understanding of compensatory strategies for improved swallowing safety. --ongoing. 3. The patient will tolerate trials of nectar thick liquids- ongoing: tolerated 5/5 via tsp and 6/6 via cup with no overt s/s this session. Assessment:   Impressions: Pt continues to present with reduced oral strength and coordination, but accepting of PO. Pt safely tolerated trials of nectar liquid via cup and teaspoon with mild anterior loss, decreased lingual coordination and bolus control, high suspicion of premature spillage to pharynx, swallow delay and decreased laryngeal elevation. No overt s/s of aspiration. Appears consistent with MBS from 4/1/19. Diet Recommendations:  Pureed/Nectar  Recommended Form of Meds: Crushed in puree as able    Strategies:   Upright as possible for all oral intake, Assist feed, Swallow 2 times per bite/sip, Liquid by spoon only, No straws, Total feed, Remain upright for 30-45 minutes after meals(oral care post meals and med pass to ensure all oral pocketing cleared)    Education:  Consulted and agree with results and recommendations: Patient, Family member  Patient Education Response: Verbalizes understanding, No evidence of learning    Prognosis for swallowing:   Good for safe diet tolerance      Plan:     Continue Dysphagia Therapy: Yes    Interventions: Therapeutic Interventions: Diet tolerance monitoring, Patient/Family education  Duration/Frequency of therapy while on unit: Duration/Frequency of Treatment  Duration/Frequency of Treatment: 3-5 times whiel on acute medical floor.  Anticipate will need additional therapy at SNF  Discharge Instructions:   Anticipate No for further skilled Speech Therapy for Dysphagia at discharge as family is currently planning for home with hospice    This note serves as a

## 2019-04-25 NOTE — PLAN OF CARE
Problem: Falls - Risk of:  Goal: Will remain free from falls  Description  Will remain free from falls  Outcome: Met This Shift  Goal: Absence of physical injury  Description  Absence of physical injury  Outcome: Met This Shift     Problem: Risk for Impaired Skin Integrity  Goal: Tissue integrity - skin and mucous membranes  Description  Structural intactness and normal physiological function of skin and  mucous membranes.   Outcome: Met This Shift     Problem: Pain:  Goal: Pain level will decrease  Description  Pain level will decrease  Outcome: Met This Shift     Problem: Nutrition  Goal: Optimal nutrition therapy  Outcome: Ongoing    Electronically signed by Coral Valentin RN on 4/25/19 at 8:49 AM

## 2019-04-25 NOTE — PROGRESS NOTES
Nutrition Assessment    Type and Reason for Visit: Reassess    Nutrition Recommendations:   Add nectar thick Ensure Clear tid to start  Monitor plan for possible hospice care    Nutrition Assessment: Pt remains at risk for nutrition compromise r/t increased needs, altered GI, mental status, altered labs r/t renal & cardiac dysfunction, infection, variable intake. Pt with worsened status at this time. MD noted that pt is chronically aspirating. Diet downgraded to nectar thick Clear Liquids. Family requesting meeting with Hospice. Malnutrition Assessment:  · Malnutrition Status: Insufficient data. Assessment not appropriate at this time. Nutrition Risk Level: Moderate    Nutrient Needs:  · Estimated Daily Total Kcal: 1895-1149 (25-30 x ABW of 55 kg)  · Estimated Daily Protein (g): 41-55 (.75-1 x ABW (adj for CKD stage 3)  · Estimated Daily Total Fluid (ml/day): 1 ml per kcal    Nutrition Diagnosis:   · Problem: Inadequate oral intake  · Etiology: related to Insufficient energy/nutrient consumption     Signs and symptoms:  as evidenced by Intake 25-50%, Intake 50-75%    Objective Information:  · Nutrition-Focused Physical Findings: BM 4/25. Pt -7.6 liters.  Noted +1 edema to BUE & trace edema to BLE  · Wound Type: (red heels, excoriated mimi)  · Current Nutrition Therapies:  · Oral Diet Orders: Clear Liquid, Nectar Thick   · Oral Diet intake: 51-75%, %  · Anthropometric Measures:  · Ht: 5' 2\" (157.5 cm)   · Current Body Wt: 122 lb (55.3 kg)  · Admission Body Wt: 134 lb (60.8 kg)  · % Weight Change:  ,  Noted decreasing wt trend but not at significant rate  · Ideal Body Wt: 110 lb (49.9 kg), % Ideal Body    · BMI Classification: BMI 18.5 - 24.9 Normal Weight    Nutrition Interventions:   Continue current diet, Start ONS  Continued Inpatient Monitoring, Education Not Indicated    Nutrition Evaluation:   · Evaluation: Progress towards goals declining   · Goals: tolerate most appropriate form of nutrition · Monitoring: Nutrition Progression, Meal Intake, Supplement Intake, Diet Tolerance, Skin Integrity, Weight, Pertinent Labs, Diarrhea, Constipation      Electronically signed by Rosa Norris RD, CULLEN on 4/25/19 at 2:55 PM    Contact Number: [phone]

## 2019-04-25 NOTE — PROGRESS NOTES
Patient with black/tarry stools this shift. One stool occurrence appeared to have a small blood clot in it. Occult test positive for blood.     Electronically signed by Mariam Mckeon RN on 4/25/19 at 4:23 AM

## 2019-04-25 NOTE — PROGRESS NOTES
D: pt has lab draw ordered for 1400, family asking if this is necessary as pt is going to be going home with hospice A: this RN retimed lab for 1600 and sent secure message to Dr. Merrick Ashley to discuss plan of care/family's wishes R: waiting on response from physician, will continue to monitor pt

## 2019-04-25 NOTE — PROGRESS NOTES
Recent Labs     04/23/19  2110 04/24/19  0541 04/25/19  0554   WBC 12.5* 20.3* 18.5*   HGB 12.5 12.2 10.6*   HCT 40.0 39.0 33.7*    223 176     Recent Labs     04/23/19  0550 04/24/19  0541 04/25/19  0554    142 152*   K 3.7 4.5 3.6    102 113*   CO2 33* 26 27   BUN 27* 51* 52*   CREATININE 1.1 1.4* 1.2   CALCIUM 8.8 8.9 8.9   PHOS 2.2* 2.0* 2.2*     No results for input(s): AST, ALT, BILIDIR, BILITOT, ALKPHOS in the last 72 hours. Recent Labs     04/23/19 2110   INR 1.96*     No results for input(s): Clarance Baird in the last 72 hours. Urinalysis:      Lab Results   Component Value Date    NITRU Negative 04/16/2019    WBCUA 17 04/16/2019    BACTERIA 4+ 03/29/2019    RBCUA 54 04/16/2019    BLOODU MODERATE 04/16/2019    SPECGRAV 1.008 04/16/2019    GLUCOSEU Negative 04/16/2019       Radiology:  CT CHEST WO CONTRAST   Preliminary Result   Peripheral subpleural areas of multifocal consolidation in the lungs with   slight improvement from 03/29/2019. Secretions in the trachea and left mainstem bronchus. Chronic right hydronephrosis. XR CHEST PORTABLE   Final Result   Low lung volume study showing prominence of the interstitial markings,   increased since 04/20/2019. This finding may be at least partially due to   low lung volumes. Correlation for interstitial edema is recommended. Mild left basilar airspace disease, atelectasis, asymmetric edema or   pneumonia. XR CHEST STANDARD (2 VW)   Final Result   Mild pulmonary vascular congestion remains but is significantly improved from   the prior study. No sizable pleural effusion appreciated. XR CHEST PORTABLE   Final Result   Features of heart failure, including bilateral pleural effusions and moderate   to severe pulmonary edema. Superimposed infection could be present in the   appropriate clinical context.          XR CHEST PORTABLE   Final Result   Mild progressive perihilar opacities which may Oropharyngeal Dysphagia, Hx of esophageal strictures s/p dilation in Dec, Hx of Hiatal Hernia  Pt on modified diet since last admit in late March 2019. No nausea or vomiting reported. This admit she presented with pulm edema, LE edema and uncontrolled hyperglycemia - I am less concerned about active dysphagia. Had repeat EGD this admit - +hiatal hernia, no reports of retained food. DMII - labile. Presented with BG elevated. Developed hypoglycemia. Now stable. Diet: DIET CLEAR LIQUID; [place] Thick  Dietary Nutrition Supplements: Clear Liquid Oral Supplement  Code Status: DNR-CC      Discussed with  and daughter at bedside again today.  very emotional, understandably so, but both son and daughter agree that she would not want to live on life support. Palliative care involved. ABG - no hypercapnia to explain worsening mental status. Hospice involved. Stop telemetry and blood draws. Family wants pt to return home with hospice if able. Condition continues to deteriorate. Multisystem organ failure with heart, lungs, kidneys and GI involved presently as well as encephalopathy. I suspect recurrent sepsis and possible active GI bleed. If able to stabilize will plan on home with hospice tomorrow, otherwise looking at IP hospice.      Karri Johns MD

## 2019-04-25 NOTE — PROGRESS NOTES
Dr. Ligia Kaplan ordered to continue BS as pt tends to run low, ok to discontinue tele and additional lab draws, will call lab concerning this

## 2019-04-25 NOTE — CARE COORDINATION
Per Palliative Care Rn, patient and family to meet with [facility].      Electronically signed by Wilda Delgado on 4/25/2019 at 11:07 AM

## 2019-04-25 NOTE — CARE COORDINATION
Met with family genaroe for plan of care discussion. Plan is for patient to return home with [address] [place] [address] tomorrow 04/26 meeting for consents around noon. DME to be delivered in am.  HOC follow up in AM.      Alida Howard RN, [facility] Liaison  [address] [place]: [phone]  C: [phone]  F: [phone]  Referrals and Information: [address] [place], [place] [email or web address]  [email or web address]

## 2019-04-26 ENCOUNTER — CARE COORDINATION (OUTPATIENT)
Dept: CARE COORDINATION | Age: 84
End: 2019-04-26

## 2019-04-26 ENCOUNTER — TELEPHONE (OUTPATIENT)
Dept: FAMILY MEDICINE CLINIC | Age: 84
End: 2019-04-26

## 2019-04-26 VITALS
WEIGHT: 119.71 LBS | RESPIRATION RATE: 18 BRPM | SYSTOLIC BLOOD PRESSURE: 136 MMHG | TEMPERATURE: 98 F | HEIGHT: 62 IN | BODY MASS INDEX: 22.03 KG/M2 | HEART RATE: 84 BPM | OXYGEN SATURATION: 99 % | DIASTOLIC BLOOD PRESSURE: 78 MMHG

## 2019-04-26 LAB
ALBUMIN SERPL-MCNC: 1.7 G/DL (ref 3.4–5)
ANION GAP SERPL CALCULATED.3IONS-SCNC: 8 MMOL/L (ref 3–16)
BASOPHILS ABSOLUTE: 0 K/UL (ref 0–0.2)
BASOPHILS RELATIVE PERCENT: 0.3 %
BUN BLDV-MCNC: 32 MG/DL (ref 7–20)
CALCIUM SERPL-MCNC: 8.5 MG/DL (ref 8.3–10.6)
CHLORIDE BLD-SCNC: 115 MMOL/L (ref 99–110)
CO2: 27 MMOL/L (ref 21–32)
CREAT SERPL-MCNC: 1.1 MG/DL (ref 0.6–1.2)
EOSINOPHILS ABSOLUTE: 0.2 K/UL (ref 0–0.6)
EOSINOPHILS RELATIVE PERCENT: 1.3 %
GFR AFRICAN AMERICAN: 57
GFR NON-AFRICAN AMERICAN: 47
GLUCOSE BLD-MCNC: 126 MG/DL (ref 70–99)
GLUCOSE BLD-MCNC: 143 MG/DL (ref 70–99)
GLUCOSE BLD-MCNC: 144 MG/DL (ref 70–99)
GLUCOSE BLD-MCNC: 150 MG/DL (ref 70–99)
GLUCOSE BLD-MCNC: 370 MG/DL (ref 70–99)
HCT VFR BLD CALC: 30.5 % (ref 36–48)
HEMOGLOBIN: 9.6 G/DL (ref 12–16)
LYMPHOCYTES ABSOLUTE: 1.1 K/UL (ref 1–5.1)
LYMPHOCYTES RELATIVE PERCENT: 8.8 %
MCH RBC QN AUTO: 27.4 PG (ref 26–34)
MCHC RBC AUTO-ENTMCNC: 31.3 G/DL (ref 31–36)
MCV RBC AUTO: 87.7 FL (ref 80–100)
MONOCYTES ABSOLUTE: 0.9 K/UL (ref 0–1.3)
MONOCYTES RELATIVE PERCENT: 6.6 %
NEUTROPHILS ABSOLUTE: 10.6 K/UL (ref 1.7–7.7)
NEUTROPHILS RELATIVE PERCENT: 83 %
PDW BLD-RTO: 20.8 % (ref 12.4–15.4)
PERFORMED ON: ABNORMAL
PHOSPHORUS: 2.9 MG/DL (ref 2.5–4.9)
PLATELET # BLD: 159 K/UL (ref 135–450)
PMV BLD AUTO: 10.4 FL (ref 5–10.5)
POTASSIUM SERPL-SCNC: 4 MMOL/L (ref 3.5–5.1)
RBC # BLD: 3.48 M/UL (ref 4–5.2)
SODIUM BLD-SCNC: 150 MMOL/L (ref 136–145)
WBC # BLD: 12.8 K/UL (ref 4–11)

## 2019-04-26 PROCEDURE — 2700000000 HC OXYGEN THERAPY PER DAY

## 2019-04-26 PROCEDURE — 97535 SELF CARE MNGMENT TRAINING: CPT

## 2019-04-26 PROCEDURE — 85025 COMPLETE CBC W/AUTO DIFF WBC: CPT

## 2019-04-26 PROCEDURE — 2500000003 HC RX 250 WO HCPCS: Performed by: INTERNAL MEDICINE

## 2019-04-26 PROCEDURE — 6370000000 HC RX 637 (ALT 250 FOR IP): Performed by: INTERNAL MEDICINE

## 2019-04-26 PROCEDURE — 97530 THERAPEUTIC ACTIVITIES: CPT

## 2019-04-26 PROCEDURE — 2580000003 HC RX 258: Performed by: INTERNAL MEDICINE

## 2019-04-26 PROCEDURE — 36415 COLL VENOUS BLD VENIPUNCTURE: CPT

## 2019-04-26 PROCEDURE — C9113 INJ PANTOPRAZOLE SODIUM, VIA: HCPCS | Performed by: INTERNAL MEDICINE

## 2019-04-26 PROCEDURE — 80069 RENAL FUNCTION PANEL: CPT

## 2019-04-26 PROCEDURE — 94640 AIRWAY INHALATION TREATMENT: CPT

## 2019-04-26 PROCEDURE — 6360000002 HC RX W HCPCS: Performed by: INTERNAL MEDICINE

## 2019-04-26 PROCEDURE — 94761 N-INVAS EAR/PLS OXIMETRY MLT: CPT

## 2019-04-26 RX ORDER — METOPROLOL SUCCINATE 25 MG/1
25 TABLET, EXTENDED RELEASE ORAL DAILY
Qty: 30 TABLET | Refills: 0 | Status: SHIPPED | OUTPATIENT
Start: 2019-04-26

## 2019-04-26 RX ORDER — MORPHINE SULFATE 100 MG/5ML
5 SOLUTION ORAL EVERY 6 HOURS PRN
Qty: 1 BOTTLE | Refills: 0 | Status: SHIPPED | OUTPATIENT
Start: 2019-04-26 | End: 2019-05-03

## 2019-04-26 RX ORDER — BLOOD SUGAR DIAGNOSTIC
1 STRIP MISCELLANEOUS
Qty: 100 EACH | Refills: 3 | Status: SHIPPED | OUTPATIENT
Start: 2019-04-26

## 2019-04-26 RX ORDER — AMOXICILLIN AND CLAVULANATE POTASSIUM 875; 125 MG/1; MG/1
1 TABLET, FILM COATED ORAL 2 TIMES DAILY
Qty: 28 TABLET | Refills: 0 | Status: SHIPPED | OUTPATIENT
Start: 2019-04-26 | End: 2019-05-10

## 2019-04-26 RX ORDER — METOPROLOL SUCCINATE 25 MG/1
25 TABLET, EXTENDED RELEASE ORAL DAILY
Status: DISCONTINUED | OUTPATIENT
Start: 2019-04-26 | End: 2019-04-26 | Stop reason: HOSPADM

## 2019-04-26 RX ORDER — PANTOPRAZOLE SODIUM 40 MG/1
40 TABLET, DELAYED RELEASE ORAL DAILY
Qty: 30 TABLET | Refills: 0 | Status: SHIPPED | OUTPATIENT
Start: 2019-04-26

## 2019-04-26 RX ORDER — LORAZEPAM 2 MG/ML
1 CONCENTRATE ORAL EVERY 4 HOURS PRN
Qty: 1 BOTTLE | Refills: 0 | Status: SHIPPED | OUTPATIENT
Start: 2019-04-26 | End: 2019-05-01

## 2019-04-26 RX ORDER — HYOSCYAMINE SULFATE 0.12 MG/1
1 TABLET SUBLINGUAL
Qty: 120 EACH | Refills: 0 | Status: SHIPPED | OUTPATIENT
Start: 2019-04-26

## 2019-04-26 RX ADMIN — ALLOPURINOL 100 MG: 100 TABLET ORAL at 08:59

## 2019-04-26 RX ADMIN — ESCITALOPRAM OXALATE 10 MG: 10 TABLET ORAL at 08:59

## 2019-04-26 RX ADMIN — PIPERACILLIN SODIUM,TAZOBACTAM SODIUM 3.38 G: 3; .375 INJECTION, POWDER, FOR SOLUTION INTRAVENOUS at 12:22

## 2019-04-26 RX ADMIN — MICONAZOLE NITRATE: 20 POWDER TOPICAL at 08:58

## 2019-04-26 RX ADMIN — Medication 10 ML: at 09:00

## 2019-04-26 RX ADMIN — PANTOPRAZOLE SODIUM 40 MG: 40 INJECTION, POWDER, FOR SOLUTION INTRAVENOUS at 08:59

## 2019-04-26 RX ADMIN — SODIUM CHLORIDE SOLN NEBU 3% 15 ML: 3 NEBU SOLN at 07:30

## 2019-04-26 RX ADMIN — SODIUM CHLORIDE SOLN NEBU 3% 15 ML: 3 NEBU SOLN at 14:03

## 2019-04-26 RX ADMIN — Medication 10 ML: at 08:59

## 2019-04-26 RX ADMIN — VITAMIN D, TAB 1000IU (100/BT) 2000 UNITS: 25 TAB at 08:59

## 2019-04-26 RX ADMIN — METOPROLOL TARTRATE 5 MG: 5 INJECTION INTRAVENOUS at 04:24

## 2019-04-26 RX ADMIN — POTASSIUM CHLORIDE AND SODIUM CHLORIDE: 450; 150 INJECTION, SOLUTION INTRAVENOUS at 07:49

## 2019-04-26 RX ADMIN — METOPROLOL SUCCINATE 25 MG: 25 TABLET, EXTENDED RELEASE ORAL at 12:22

## 2019-04-26 RX ADMIN — INSULIN LISPRO 10 UNITS: 100 INJECTION, SOLUTION INTRAVENOUS; SUBCUTANEOUS at 12:22

## 2019-04-26 RX ADMIN — COLCHICINE 0.6 MG: 0.6 TABLET, FILM COATED ORAL at 08:59

## 2019-04-26 RX ADMIN — PIPERACILLIN SODIUM,TAZOBACTAM SODIUM 3.38 G: 3; .375 INJECTION, POWDER, FOR SOLUTION INTRAVENOUS at 04:25

## 2019-04-26 ASSESSMENT — PAIN SCALES - GENERAL
PAINLEVEL_OUTOF10: 0
PAINLEVEL_OUTOF10: 0

## 2019-04-26 NOTE — CARE COORDINATION
Home with HOC by [place] [employer] at  today. Meds filled at [facility], request attends and pads for home,        Jonathan Peters RN, [facility]  [address] [place]: [phone]  C: [phone]  F: [phone]  Referrals and Information: [address] [place] [address]. [facility]  [email or web address]

## 2019-04-26 NOTE — PROGRESS NOTES
Occupational Therapy  Facility/Department: [address] PROGRESSIVE CARE  Daily Treatment Note  NAME: Krista Tompkins  : 1929  MRN: [id number]    Date of Service: 2019    Discharge Recommendations:  24 hour supervision or assist       Assessment   Performance deficits / Impairments: Decreased functional mobility ; Decreased high-level IADLs;Decreased ADL status; Decreased endurance;Decreased cognition;Decreased strength;Decreased balance;Decreased safe awareness  Assessment: Discussed with OTR: PT tolerated session poorly  today. Pt required Max A of 1 for rolling right and left from daughter. Min A of another to keep patient on her side for toileting needs and to change pad. Cont POC. Patient Education: With daughter and spouse: Role of OT, bed mobility, back safety for daughter and 25 harriet assist required at home  REQUIRES OT FOLLOW UP: Yes  Activity Tolerance  Activity Tolerance: Patient limited by pain  Safety Devices  Safety Devices in place: Yes  Type of devices: Left in bed;Bed alarm in place;Call light within reach;Nurse notified         Patient Diagnosis(es): The primary encounter diagnosis was HCAP (healthcare-associated pneumonia). Diagnoses of Sepsis, due to unspecified organism ([address] Lactic acidosis, Chronic kidney disease, unspecified CKD stage, Type 2 diabetes mellitus with hyperglycemia, unspecified whether long term insulin use ([address] Congestive heart failure, unspecified HF chronicity, unspecified heart failure type ([address] Paroxysmal atrial fibrillation ([address] Atrial flutter, paroxysmal ([address] Localized swelling of both lower legs, Other hypervolemia, and Comfort measures only status were also pertinent to this visit.       has a past medical history of Cataract, Chronic midline low back pain without sciatica, Esophageal dilatation, Esophageal stricture, Essential hypertension, GERD (gastroesophageal reflux disease), Gouty arthropathy, History of breast cancer, Hypercholesterolemia, Incontinence, Lumbar spondylosis, Osteoporosis of multiple sites, Primary osteoarthritis involving multiple joints, Spinal stenosis, lumbar, and Type 2 diabetes mellitus without complication, without long-term current use of insulin ([facility] has a past surgical history that includes Mastectomy; Cataract removal (jan 2012); Cataract removal (Nov. 2012); Cholecystectomy; Upper gastrointestinal endoscopy; Upper gastrointestinal endoscopy (N/A, 12/6/2018); Esophagus dilation (N/A, 12/6/2018); Upper gastrointestinal endoscopy (12/13/2018); Upper gastrointestinal endoscopy (N/A, 12/13/2018); Esophagus dilation (N/A, 12/13/2018); and Upper gastrointestinal endoscopy (N/A, 4/24/2019). Restrictions  Restrictions/Precautions  Restrictions/Precautions: Fall Risk  Position Activity Restriction  Other position/activity restrictions: diet: dysphagia I pureed/ honey thick  Subjective   General  Chart Reviewed: Yes  Patient assessed for rehabilitation services?: Yes  Response to previous treatment: Patient reporting fatigue but able to participate  Family / Caregiver Present: Yes(spouse and daughter)  Diagnosis: HCAP, sepsis, hyperglycemia  Subjective  Subjective: Patient supine in bed upon arrival to room with PT. Family present with questions regarding rolling in bed and managing toileting needs at home         Orientation  Orientation  Overall Orientation Status: Impaired  Orientation Level: Oriented to person;Disoriented to time;Disoriented to place; Disoriented to situation  Objective    ADL  Toileting: Dependent/Total(Patient with flexi seal in place. Rolling right and left to change pad under patient and to clean bottom and mimi area. Daughter assist with cleaning patient, putting new pad in place )           Bed mobility  Rolling to Left: Maximum assistance  Rolling to Right: Maximum assistance  Comment: Rolling right and left with Max A of 1. Patient slightly able to assist with side rail.  Min A of another to keep patient on side for toileting and to change pad. Daughter does assist with rolling            Cognition  Overall Cognitive Status: Exceptions  Arousal/Alertness: Delayed responses to stimuli  Following Commands: Inconsistently follows commands  Attention Span: Difficulty attending to directions  Problem Solving: Assistance required to generate solutions;Assistance required to implement solutions  Initiation: Requires cues for all  Sequencing: Requires cues for all  Cognition Comment: [place]; hx Alz Dementia       Assessment   Performance deficits / Impairments: Decreased functional mobility ; Decreased high-level IADLs;Decreased ADL status; Decreased endurance;Decreased cognition;Decreased strength;Decreased balance;Decreased safe awareness  Assessment: Discussed with OTR: PT tolerated session poorly  today. Pt required Max A of 1 for rolling right and left from daughter. Min A of another to keep patient on her side for toileting needs and to change pad. Cont POC. Patient Education: With daughter and spouse: Role of OT, bed mobility, back safety for daughter and 25 harriet assist required at home  REQUIRES OT FOLLOW UP: Yes  Activity Tolerance  Activity Tolerance: Patient limited by pain  Safety Devices  Safety Devices in place: Yes  Type of devices: Left in bed;Bed alarm in place;Call light within reach;Nurse notified         Plan   Plan  Times per week: 3-5x  Current Treatment Recommendations: Functional Mobility Training, Equipment Evaluation, Education, & procurement, Patient/Caregiver Education & Training, Self-Care / ADL, Balance Training, Strengthening    AM-PAC Score        AM-PAC Inpatient Daily Activity Raw Score: 10  AM-PAC Inpatient ADL T-Scale Score : 27.31  ADL Inpatient CMS 0-100% Score: 74.7  ADL Inpatient CMS G-Code Modifier : CL    Goals  Short term goals  Time Frame for Short term goals: Status:  All goals ongoing   Short term goal 1: Mod A for grooming  Short term goal 2: Max A for bathing and

## 2019-04-26 NOTE — CARE COORDINATION
DM flyer mailed to patient for upcoming free DM education classes at [address] on 6/4/19, 8/13/19, & 10/8/19 from 1-3PM. [facility] Rebecca's contact information on flyer to register for upcoming class.      [facility] Coordination   L:[phone]  W:[phone]

## 2019-04-26 NOTE — PROGRESS NOTES
Physical Therapy  [facility] C[facility]  Daily Treatment Note - COTX  This note serves as D/C summary if patient is discharged prior to next treatment session. Assessment: PT and TURCIOS provided assist and education to family for pt positioning and technique for bed mobility to facilitate safe return home. Pt required Max/total assist for all rolling/positioning. Anticipate return home today with assist from daughter/, and HOC. NAME: Pineda Cervantes  : 1929  MRN: [id number]    Date of Service: 2019    Discharge Recommendations:  24 hour supervision or assist   PT Equipment Recommendations  Equipment Needed: No    Patient Diagnosis(es): The primary encounter diagnosis was HCAP (healthcare-associated pneumonia). Diagnoses of Sepsis, due to unspecified organism ([address] Lactic acidosis, Chronic kidney disease, unspecified CKD stage, Type 2 diabetes mellitus with hyperglycemia, unspecified whether long term insulin use ([address] Congestive heart failure, unspecified HF chronicity, unspecified heart failure type ([address] Paroxysmal atrial fibrillation ([address] Atrial flutter, paroxysmal ([address] Localized swelling of both lower legs, Other hypervolemia, and Comfort measures only status were also pertinent to this visit. has a past medical history of Cataract, Chronic midline low back pain without sciatica, Esophageal dilatation, Esophageal stricture, Essential hypertension, GERD (gastroesophageal reflux disease), Gouty arthropathy, History of breast cancer, Hypercholesterolemia, Incontinence, Lumbar spondylosis, Osteoporosis of multiple sites, Primary osteoarthritis involving multiple joints, Spinal stenosis, lumbar, and Type 2 diabetes mellitus without complication, without long-term current use of insulin ([address] has a past surgical history that includes Mastectomy; Cataract removal (2012); Cataract removal (2012); Cholecystectomy;  Upper gastrointestinal endoscopy; Upper gastrointestinal endoscopy (N/A, 12/6/2018); Esophagus dilation (N/A, 12/6/2018); Upper gastrointestinal endoscopy (12/13/2018); Upper gastrointestinal endoscopy (N/A, 12/13/2018); Esophagus dilation (N/A, 12/13/2018); and Upper gastrointestinal endoscopy (N/A, 4/24/2019). Restrictions  Restrictions/Precautions  Restrictions/Precautions: Fall Risk  Position Activity Restriction  Other position/activity restrictions: diet: dysphagia I pureed/ honey thick     Subjective   General  Chart Reviewed: Yes  Additional Pertinent Hx: Per Dr. Weston De Guzman, 4/15, \"This is a 80 y.o. female who presented to the ED on 4/15 with a CC of SOB. Per ED notes she was recently discharged from here with pneumonia, CHF and afib. She went to nursing home. She came back with SOB and admitted for HCAP among other things. She is not able to provide CC or HPI due to underlying dementia. \"  PMH includes cataract, essential HTN, GERD, gouty arthropathy, breast Ca, lumbar spondylosis, DMII, Altz's dementia  Response To Previous Treatment: Patient with no complaints from previous session. Referring Practitioner: Berto Tapia MD  Subjective  Subjective: PT and TURCIOS to assist pt with rolling in bed, and educate family on proper assist technique to facilitate return home with hospice. Daughter and  present. Pt agreeable to session. Objective      Bed mobility  Rolling to Left: Maximum assistance  Rolling to Right: Maximum assistance  Scooting: Dependent/Total;2 Person assistance     Transfers  Sit to Stand: Unable to assess  Stand to sit: Unable to assess     Ambulation  Ambulation?: No  Stairs/Curb  Stairs?: No     Other Activities: Other (see comment)  Comment: Daughter stated she will be sole, hands-on caregiver at home. She requested instruction on technique for rolling, positioning, and assist with mimi-care.   PT and TURCIOS educated daughter on techniqe for rolling (positioning LEs, encouraging pt to assist with UEs/LEs if possible, rolling pt by using proper points of control such as hips/trunk, and propping pt with wedge/pillow for pressure relief). Daughter able to roll pt R/L in bed multiple trials, providing Max A. She did require intermittent cueing for technique. Daughter able to position new sterling pad, and assist pt with mimi-care (total). Daughter then able to scoot pt up in bed with assist from PT (total assist x 2). PT educated daughter to raise bed, and flatten HOB to facilitate ease of scooting/positioning. Daughter demonstrated proper technique. PT also educated daughter that if pt wants to sit at [facility], daughter should position [facility] to maximally elevated position to decrease lifting burden. Daughter verbalized understanding. At end of session, pt was positioned for comfort with HOB slightly elevated, call light in reach, alarm in place. Assessment   Body structures, Functions, Activity limitations: Decreased functional mobility ; Decreased ADL status  Assessment: PT and TURCIOS provided assist and education to family for pt positioning and technique for bed mobility to facilitate safe return home. Pt required Max/total assist for all rolling/positioning. Anticipate return home today with assist from daughter/, and HOC. Prognosis: Fair  Patient Education: role & goals of PT; technique for rolling/positioning  Activity Tolerance  Activity Tolerance: Patient Tolerated treatment well;Patient limited by fatigue       Goals  Short term goals  Time Frame for Short term goals: upon d/c--goals ongoing, with limited progress this date. 4/24/19   Short term goal 1: Bed mobility with mod A of 1-2. Short term goal 2: Transfers sit <> stand with mod A of 1-2. Short term goal 3: Tolerate sitting up in recliner >2 hours. Short term goal 4: Ambulate with the walker 10 feet with mod A of 2.    Patient Goals   Patient goals : dgt would like for the pt to be able to go home    Plan    Plan  Times per week: 3-5x/wk in acute setting   Current Treatment Recommendations: Safety Education & Training, Patient/Caregiver Education & Training, Pain Management, Positioning, Equipment Evaluation, Education, & procurement  Safety Devices  Type of devices:  All fall risk precautions in place, Call light within reach, Bed alarm in place, Left in bed  Restraints  Initially in place: No     Therapy Time   Individual Concurrent Group Co-treatment   Time In 1030         Time Out 1100         Minutes 30         Timed Code Treatment Minutes: 69 Tracie Milian, PT    Electronically signed by Teresa Gupta, PT [id number] on 4/26/2019 at 11:31 AM

## 2019-04-26 NOTE — PLAN OF CARE
Problem: Falls - Risk of:  Goal: Will remain free from falls  Description  Will remain free from falls  Outcome: Met This Shift  Goal: Absence of physical injury  Description  Absence of physical injury  Outcome: Met This Shift     Problem: OXYGENATION/RESPIRATORY FUNCTION  Goal: Patient will maintain patent airway  Outcome: Met This Shift     Problem: HEMODYNAMIC STATUS  Goal: Patient has stable vital signs and fluid balance  Outcome: Met This Shift     Problem: Risk for Impaired Skin Integrity  Goal: Tissue integrity - skin and mucous membranes  Description  Structural intactness and normal physiological function of skin and  mucous membranes. Outcome: Ongoing: Flexiseal in place, patient incontinent of urine, Q2 turn and clean, mepilex border placed on sacrum for protection, mepilex borders in place on heels. Problem: OXYGENATION/RESPIRATORY FUNCTION  Goal: Patient will achieve/maintain normal respiratory rate/effort  Description  Respiratory rate and effort will be within normal limits for the patient  Outcome: Ongoing: Some SOB with turning and at rest at times. Problem: Nutrition  Goal: Optimal nutrition therapy  Outcome: Not Met This Shift: Very poor intake.     Electronically signed by Reid Elias RN on 4/26/19 at 8:09 AM

## 2019-04-26 NOTE — PROGRESS NOTES
Pharmacy Heart Failure Medication Reconciliation Note    Previous discharge cancelled, consulted today to complete medication reconciliation. Patient's status changed and she will be discharged with Hospice. Completed new Med Rec as follows. Pt discharged from [facility] today after admission for respiratory failure. Carlos Oconnor has a diagnosis of systolic heart failure (last EF = 35-40% on 4/15/19). Patient taking an ACEI / ARB / Entresto:  No: due to reduced renal function per Dr. Julissa Trejo      Patient taking a BETA BLOCKER:  Yes  Patient taking a LOOP DIURETIC: No: held  Patient taking a ALDOSTERONE RECEPTOR ANTAGONIST: No: EF >35%     Corrections to discharge medications include:  none    Discharge Medications:         Medication List        START taking these medications      amoxicillin-clavulanate 875-125 MG per tablet  Commonly known as:  AUGMENTIN  Take 1 tablet by mouth 2 times daily for 14 days  Replaces:  amoxicillin-clavulanate 500-125 MG per tablet     Hyoscyamine Sulfate SL 0.125 MG Subl  Commonly known as:  LEVSIN/SL  Place 1 capsule under the tongue every 4-6 hours as needed (secretions.)     insulin lispro 100 UNIT/ML injection vial  Commonly known as:  HUMALOG  Inject SQ before meals per sliding scale **Medium Dose Corrective Algorithm** Glucose: Dose: If <139             No Insulin 140-199 2 Units 200-249 4 Units 250-299 6 Units 300-349 8 Units 350-400 10 Units Above 400       12 Units     Insulin Syringe-Needle U-100 30G X 5/16\" 0.3 ML Misc  Commonly known as:  [employer] INSULIN SYR 0.3CC/30G  1 each by Does not apply route 3 times daily (before meals)     LORazepam 2 MG/ML concentrated solution  Commonly known as:  LORAzepam intensol  Take 0.5 mLs by mouth every 4 hours as needed (anxiety, dypnea.) for up to 5 days. morphine sulfate 20 MG/ML concentrated oral solution  Take 0.25 mLs by mouth every 6 hours as needed for Pain for up to 7 days.             CONTINUE taking these medications metoprolol succinate 25 MG extended release tablet  Commonly known as:  TOPROL XL  Take 1 tablet by mouth daily     pantoprazole 40 MG tablet  Commonly known as:  PROTONIX  Take 1 tablet by mouth daily            STOP taking these medications      allopurinol 100 MG tablet  Commonly known as:  ZYLOPRIM     amoxicillin-clavulanate 500-125 MG per tablet  Commonly known as:  AUGMENTIN  Replaced by:  amoxicillin-clavulanate 875-125 MG per tablet     aspirin 81 MG tablet     COLCRYS 0.6 MG tablet  Generic drug:  colchicine     Cyanocobalamin 1000 MCG/ML Kit     escitalopram 10 MG tablet  Commonly known as:  LEXAPRO     folic acid 1 MG tablet  Commonly known as:  FOLVITE     furosemide 20 MG tablet  Commonly known as:  LASIX     metroNIDAZOLE 500 MG tablet  Commonly known as:  FLAGYL     potassium chloride 10 MEQ extended release tablet  Commonly known as:  KLOR-CON M     rivaroxaban 15 MG Tabs tablet  Commonly known as:  XARELTO     vitamin D 2000 units Caps capsule               Where to Get Your Medications        You can get these medications from any pharmacy    Bring a paper prescription for each of these medications  amoxicillin-clavulanate 875-125 MG per tablet  Hyoscyamine Sulfate SL 0.125 MG Subl  insulin lispro 100 UNIT/ML injection vial  Insulin Syringe-Needle U-100 30G X 5/16\" 0.3 ML Misc  LORazepam 2 MG/ML concentrated solution  metoprolol succinate 25 MG extended release tablet  morphine sulfate 20 MG/ML concentrated oral solution  pantoprazole 40 MG tablet         Lore Cespedes, PharmD  Heart Failure Discharge Medication Reconciliation Program  [phone]

## 2019-04-26 NOTE — PROGRESS NOTES
Patient resting in bed, alert and providing one word answers, eating breakfast with assistance from PCA. Morning assessment complete per flow sheet.

## 2019-04-26 NOTE — DISCHARGE SUMMARY
supplements.    Replete Mg IV.         Chronic kidney disease stage III, Cr worse today - IVF restarted.         Pneumonia, possibly gram-negative, healthcare associated pneumonia, suspect chronic ongoing aspiration. Completed cefepime - day 7/7 4/21/2019  MRSA negative. Film array with coronavirus. With decompensation and sepsis started on Vanc, repeat MRSA testing negative. Off Vanc and on IV zosyn.            Atrial fibrillation - confirmed diagnosis last admit in late march 2019  controlled with Metoprolol and Xarelto  RVR this am - NPO - switch BB to IV.          Oropharyngeal Dysphagia, Hx of esophageal strictures s/p dilation in Dec, Hx of Hiatal Hernia  Pt on modified diet since last admit in late March 2019.           DMII - labile. Presented with BG elevated. Developed hypoglycemia. Now stable.          Discussed with  and daughter at bedside. Pt shows mild improvement today, but overall condition continues to deteriorate. They are both clear that pt would not have wanted any artificial life support. She is in multisystem organ failure with Pulm, GI, CNS, Renal involvement. Comfort measures pursued. Hospice involved. Daughter and  want pt to die peacefully at home - do not want ECF care. Plan to discharge home with hospice today. Prognosis very poor. Daughter wanted to continue PO Abx if pt able to tolerate. Also insulin per sliding scale if able to eat. Will also start morphine, ativan and levsin PRN if symptoms of pain or dyspnea or secretions develop. Physical Exam Performed:     /78   Pulse 84   Temp 98 °F (36.7 °C) (Axillary)   Resp 18   Ht 5' 2\" (1.575 m)   Wt 119 lb 11.4 oz (54.3 kg)   SpO2 99%   BMI 21.90 kg/m²       General appearance: No apparent distress  Neck: Supple  Respiratory:  Normal respiratory effort. Clear to auscultation, bilaterally without Rales/Wheezes/Rhonchi.   Cardiovascular: Regular rate and rhythm with normal S1/S2 without murmurs, rubs or gallops. Abdomen: Soft, non-tender  Musculoskeletal: No clubbing. Bilateral leg edema   Skin: Skin color, texture, turgor normal.  No rashes or lesions. Neurologic:  No focal weakness   Psychiatric: Obtunded. Capillary Refill: Brisk,< 3 seconds   Peripheral Pulses: +2 palpable, equal bilaterally         Labs: For convenience and continuity at follow-up the following most recent labs are provided:      CBC:    Lab Results   Component Value Date    WBC 12.8 04/26/2019    HGB 9.6 04/26/2019    HCT 30.5 04/26/2019     04/26/2019       Renal:    Lab Results   Component Value Date     04/26/2019    K 4.0 04/26/2019    K 4.1 04/02/2019     04/26/2019    CO2 27 04/26/2019    BUN 32 04/26/2019    CREATININE 1.1 04/26/2019    CALCIUM 8.5 04/26/2019    PHOS 2.9 04/26/2019         Significant Diagnostic Studies    Radiology:   CT CHEST WO CONTRAST   Preliminary Result   Peripheral subpleural areas of multifocal consolidation in the lungs with   slight improvement from 03/29/2019. Secretions in the trachea and left mainstem bronchus. Chronic right hydronephrosis. XR CHEST PORTABLE   Final Result   Low lung volume study showing prominence of the interstitial markings,   increased since 04/20/2019. This finding may be at least partially due to   low lung volumes. Correlation for interstitial edema is recommended. Mild left basilar airspace disease, atelectasis, asymmetric edema or   pneumonia. XR CHEST STANDARD (2 VW)   Final Result   Mild pulmonary vascular congestion remains but is significantly improved from   the prior study. No sizable pleural effusion appreciated. XR CHEST PORTABLE   Final Result   Features of heart failure, including bilateral pleural effusions and moderate   to severe pulmonary edema. Superimposed infection could be present in the   appropriate clinical context.          XR CHEST PORTABLE   Final Result   Mild progressive perihilar opacities which may represent developing pulmonary   edema or progressive bronchopneumonia. Consults:     IP CONSULT TO HOSPITALIST  IP CONSULT TO PULMONOLOGY  IP CONSULT TO CARDIOLOGY  IP CONSULT TO GI  IP CONSULT TO PULMONOLOGY  IP CONSULT TO PALLIATIVE CARE  IP CONSULT TO HOSPICE    Disposition:  Home w hospice care     Condition at Discharge: Terminal    Discharge Instructions/Follow-up:  Home hospice. Code Status:  DNR-CC     Activity: activity as tolerated    Diet: regular diet for comfort. Discharge Medications:     Discharge Medication List as of 4/26/2019 11:45 AM           Details   amoxicillin-clavulanate (AUGMENTIN) 875-125 MG per tablet Take 1 tablet by mouth 2 times daily for 14 days, Disp-28 tablet, R-0Print      Hyoscyamine Sulfate SL (LEVSIN/SL) 0.125 MG SUBL Place 1 capsule under the tongue every 4-6 hours as needed (secretions.), Disp-120 each, R-0Print      Insulin Syringe-Needle U-100 ([employer] INSULIN SYR 0.3CC/30G) 30G X 5/16\" 0.3 ML MISC 3 TIMES DAILY BEFORE MEALS Starting Fri 4/26/2019, Disp-100 each, R-3, Print      LORazepam (LORAZEPAM INTENSOL) 2 MG/ML concentrated solution Take 0.5 mLs by mouth every 4 hours as needed (anxiety, dypnea.) for up to 5 days. , Disp-1 Bottle, R-0Print      morphine sulfate 20 MG/ML concentrated oral solution Take 0.25 mLs by mouth every 6 hours as needed for Pain for up to 7 days. , Disp-1 Bottle, R-0Print              Details   insulin lispro (HUMALOG) 100 UNIT/ML injection vial Inject SQ before meals per sliding scale **Medium Dose Corrective Algorithm** Glucose: Dose: If <139             No Insulin 140-199 2 Units 200-249 4 Units 250-299 6 Units 300-349 8 Units 350-400 10 Units Above 400       12 Units, Disp-1 vial, R-3Print      pantoprazole (PROTONIX) 40 MG tablet Take 1 tablet by mouth daily, Disp-30 tablet, R-0Print      metoprolol succinate (TOPROL XL) 25 MG extended release tablet Take 1 tablet

## 2019-04-26 NOTE — PROGRESS NOTES
flexiseal rectal tube removed without incident. IV removed, patient's daughter to pharmacy to  home medications. Patient off the unit via transport.

## 2019-04-27 ENCOUNTER — TELEPHONE (OUTPATIENT)
Dept: FAMILY MEDICINE CLINIC | Age: 84
End: 2019-04-27

## 2019-04-27 NOTE — TELEPHONE ENCOUNTER
Sary [facility] Transitions Initial Follow Up Call    Outreach made within 2 business days of discharge: Yes    Patient: Sweta Zabala Patient : [id number]   MRN: [id number]  Reason for Admission: There are no discharge diagnoses documented for the most recent discharge. Discharge Date: 19       Spoke with: DOP and hospice nurse    Discharge department/facility: [facility] to [facility]    TCM Interactive Patient Contact:  Was patient able to fill all prescriptions: Yes  Was patient instructed to bring all medications to the follow-up visit: No: f/u visit not needed  Is patient taking all medications as directed in the discharge summary? No  Does patient understand their discharge instructions: Yes  Does patient have questions or concerns that need addressed prior to 7-14 day follow up office visit: no    Scheduled appointment with PCP within 7-14 days  appt with pcp is not needed. Pt dc with home hospice.   Dr Ruggiero will be signing hospice orders  Follow Up  Future Appointments   Date Time Provider Sandeep Fox   2019  1:30 PM Alyson [address], [address] [facility]   2019 11:20 AM Frank Lorenzo MD [facility]       Sorin Harrington

## 2019-04-29 ENCOUNTER — CARE COORDINATION (OUTPATIENT)
Dept: CARE COORDINATION | Age: 84
End: 2019-04-29

## 2019-04-29 LAB
BLOOD CULTURE, ROUTINE: NORMAL
CULTURE, BLOOD 2: NORMAL

## 2019-04-29 NOTE — CARE COORDINATION
Ambulatory Care Coordination Note  4/29/2019  CM Risk Score: 7  Henri Mortality Risk Score: 44    ACC: Jez Davis, RN    Summary Note:     Patient d/c from Hospital to [facility]  Reviewed medications  Patient's daughter denies any needs/concerns at this time  Hospice aide will see patient 3 times/week and hospice nurse weekly; this will increase if needed  Family has been supportive and they take turns staying with patient/spouse at night  Patient is not eating but still taking sips of water  DOP denies need for equipment/assistance  Offered continued support        Care Coordination Interventions    Program Enrollment:  Rising Risk  Referral from Primary Care Provider:  No  Suggested Interventions and Community Resources  Home Care Waiver:  Declined (Comment: patient has been evaluated for [employer]  services in the past and they decline)  [address]:  Completed  Pharmacist:  Not Started (Comment: Patient is dependent on caregiver; this highlighted automatically and referral not needed)  Physical Therapy:  Completed  Senior Services:  Declined (Comment: Recommended [facility])  Other Services: In Process (Comment: [employer])  [employer] Management Tools: In Process  Other Services or Interventions:  swallow eval-to be done by [address]     None          Prior to Admission medications    Medication Sig Start Date End Date Taking?  Authorizing Provider   insulin lispro (HUMALOG) 100 UNIT/ML injection vial Inject SQ before meals per sliding scale **Medium Dose Corrective Algorithm** Glucose: Dose: If <139             No Insulin 140-199 2 Units 200-249 4 Units 250-299 6 Units 300-349 8 Units 350-400 10 Units Above 400       12 Units 4/26/19  Yes José Luis Atkinson MD   pantoprazole (PROTONIX) 40 MG tablet Take 1 tablet by mouth daily 4/26/19  Yes José Luis Atkinson MD   metoprolol succinate (TOPROL XL) 25 MG extended release tablet Take 1 tablet by mouth daily 4/26/19  Yes Africa Alejandra MD   amoxicillin-clavulanate (AUGMENTIN) 875-125 MG per tablet Take 1 tablet by mouth 2 times daily for 14 days 4/26/19 5/10/19 Yes Africa Alejandra MD   Hyoscyamine Sulfate SL (LEVSIN/SL) 0.125 MG SUBL Place 1 capsule under the tongue every 4-6 hours as needed (secretions.) 4/26/19  Yes Africa Alejandra MD   Insulin Syringe-Needle U-100 ([employer] INSULIN SYR 0.3CC/30G) 30G X 5/16\" 0.3 ML MISC 1 each by Does not apply route 3 times daily (before meals) 4/26/19  Yes Africa Alejandra MD   LORazepam (LORAZEPAM INTENSOL) 2 MG/ML concentrated solution Take 0.5 mLs by mouth every 4 hours as needed (anxiety, dypnea.) for up to 5 days. 4/26/19 5/1/19 Yes Africa Alejandra MD   morphine sulfate 20 MG/ML concentrated oral solution Take 0.25 mLs by mouth every 6 hours as needed for Pain for up to 7 days.  4/26/19 5/3/19 Yes Africa Alejandra MD       Future Appointments   Date Time Provider Sandeep Dominique   6/17/2019  1:30 PM Alicia Cardenas MD [facility]   8/1/2019 11:20 AM Pema Arthur MD [facility]

## 2019-05-23 ENCOUNTER — CARE COORDINATION (OUTPATIENT)
Dept: CARE COORDINATION | Age: 84
End: 2019-05-23

## 2019-06-07 ENCOUNTER — TELEPHONE (OUTPATIENT)
Dept: FAMILY MEDICINE CLINIC | Age: 84
End: 2019-06-07

## 2019-06-07 NOTE — TELEPHONE ENCOUNTER
Anuj Plummer is calling from [facility] because the pt is having some restlessness at night and they will like to know can they get an order for some Seroquel. Please advise,    Thanks.

## 2019-07-05 ENCOUNTER — TELEPHONE (OUTPATIENT)
Dept: FAMILY MEDICINE CLINIC | Age: 84
End: 2019-07-05

## 2019-07-12 ENCOUNTER — TELEPHONE (OUTPATIENT)
Dept: FAMILY MEDICINE CLINIC | Age: 84
End: 2019-07-12

## 2019-08-10 ENCOUNTER — TELEPHONE (OUTPATIENT)
Dept: FAMILY MEDICINE CLINIC | Age: 84
End: 2019-08-10

## 2019-08-12 ENCOUNTER — TELEPHONE (OUTPATIENT)
Dept: FAMILY MEDICINE CLINIC | Age: 84
End: 2019-08-12

## 2022-08-08 NOTE — TELEPHONE ENCOUNTER
Called and spoke with DOP. No worsening redness, about the same. Not complaining of pain. Continue current tx and call if worsening. They are ok with plan.
DOP is calling stating there is no change in her left foot.   It is still swollen the same as yesterday     They had the xray done yesterday
FYI
NOTED
502

## (undated) DEVICE — BLUE SILICONE TUNGSTEN MALONEY BOUGIE SET: Brand: M-FLEX®

## (undated) DEVICE — ENDO CARRY-ON PROCEDURE KIT: Brand: ENDO CARRY-ON PROCEDURE KIT

## (undated) DEVICE — MOUTHPIECE ENDOSCP L CTRL OPN AND SIDE PORTS DISP

## (undated) DEVICE — ENDOSCOPY KIT: Brand: MEDLINE INDUSTRIES, INC.